# Patient Record
Sex: MALE | Race: BLACK OR AFRICAN AMERICAN | NOT HISPANIC OR LATINO | Employment: OTHER | URBAN - METROPOLITAN AREA
[De-identification: names, ages, dates, MRNs, and addresses within clinical notes are randomized per-mention and may not be internally consistent; named-entity substitution may affect disease eponyms.]

---

## 2020-07-07 ENCOUNTER — OFFICE VISIT (OUTPATIENT)
Dept: OPHTHALMOLOGY | Facility: CLINIC | Age: 70
End: 2020-07-07
Payer: MEDICARE

## 2020-07-07 DIAGNOSIS — H40.1133 PRIMARY OPEN ANGLE GLAUCOMA (POAG) OF BOTH EYES, SEVERE STAGE: Primary | ICD-10-CM

## 2020-07-07 DIAGNOSIS — H26.9 CORTICAL CATARACT OF BOTH EYES: ICD-10-CM

## 2020-07-07 PROCEDURE — 92002 PR EYE EXAM, NEW PATIENT,INTERMED: ICD-10-PCS | Mod: HCNC,S$GLB,, | Performed by: OPHTHALMOLOGY

## 2020-07-07 PROCEDURE — 99999 PR PBB SHADOW E&M-EST. PATIENT-LVL II: ICD-10-PCS | Mod: PBBFAC,HCNC,, | Performed by: OPHTHALMOLOGY

## 2020-07-07 PROCEDURE — 99999 PR PBB SHADOW E&M-EST. PATIENT-LVL II: CPT | Mod: PBBFAC,HCNC,, | Performed by: OPHTHALMOLOGY

## 2020-07-07 PROCEDURE — 92133 POSTERIOR SEGMENT OCT OPTIC NERVE(OCULAR COHERENCE TOMOGRAPHY) - OU - BOTH EYES: ICD-10-PCS | Mod: HCNC,S$GLB,, | Performed by: OPHTHALMOLOGY

## 2020-07-07 PROCEDURE — 92083 HUMPHREY VISUAL FIELD - OU - BOTH EYES: ICD-10-PCS | Mod: HCNC,S$GLB,, | Performed by: OPHTHALMOLOGY

## 2020-07-07 PROCEDURE — 92133 CPTRZD OPH DX IMG PST SGM ON: CPT | Mod: HCNC,S$GLB,, | Performed by: OPHTHALMOLOGY

## 2020-07-07 PROCEDURE — 92002 INTRM OPH EXAM NEW PATIENT: CPT | Mod: HCNC,S$GLB,, | Performed by: OPHTHALMOLOGY

## 2020-07-07 PROCEDURE — 92083 EXTENDED VISUAL FIELD XM: CPT | Mod: HCNC,S$GLB,, | Performed by: OPHTHALMOLOGY

## 2020-07-07 RX ORDER — LATANOPROST 50 UG/ML
1 SOLUTION/ DROPS OPHTHALMIC NIGHTLY
Qty: 1 BOTTLE | Refills: 12 | Status: SHIPPED | OUTPATIENT
Start: 2020-07-07 | End: 2021-09-09

## 2020-07-07 RX ORDER — CARVEDILOL 25 MG/1
25 TABLET ORAL
COMMUNITY
Start: 2020-03-24 | End: 2022-03-08 | Stop reason: SDUPTHER

## 2020-07-07 RX ORDER — SPIRONOLACTONE 100 MG/1
100 TABLET, FILM COATED ORAL
COMMUNITY
Start: 2020-03-24 | End: 2022-03-08 | Stop reason: SDUPTHER

## 2020-07-07 RX ORDER — ASPIRIN 81 MG/1
81 TABLET ORAL
COMMUNITY
Start: 2020-02-28

## 2020-07-07 RX ORDER — NITROGLYCERIN 0.4 MG/1
TABLET SUBLINGUAL
COMMUNITY
Start: 2020-02-28 | End: 2022-03-08 | Stop reason: SDUPTHER

## 2020-07-07 RX ORDER — LISINOPRIL 40 MG/1
40 TABLET ORAL
COMMUNITY
Start: 2020-03-24 | End: 2022-03-08 | Stop reason: SDUPTHER

## 2020-07-07 RX ORDER — APIXABAN 5 MG/1
TABLET, FILM COATED ORAL
COMMUNITY
Start: 2020-06-04 | End: 2022-03-08 | Stop reason: SDUPTHER

## 2020-07-07 RX ORDER — ATORVASTATIN CALCIUM 80 MG/1
80 TABLET, FILM COATED ORAL
COMMUNITY
Start: 2020-03-24 | End: 2022-03-08 | Stop reason: SDUPTHER

## 2020-07-07 RX ORDER — FUROSEMIDE 40 MG/1
40 TABLET ORAL
COMMUNITY
Start: 2020-03-24 | End: 2022-03-08

## 2020-07-07 NOTE — PROGRESS NOTES
SUBJECTIVE  Raghavendra JUANA Luz is 70 y.o. male  Corrected distance visual acuity was 20/200 in the right eye and 20/50 in the left eye.   No chief complaint on file.              Assessment /Plan :  1. Primary open angle glaucoma (POAG) of both eyes, severe stage IOP not within acceptable range relative to target IOP with risk of irreversible visual loss. Better IOP control is recommended. Discussed options, risks, and benefits of additional medication, SLT laser, and/or incisional glaucoma surgery. Reviewed importance of continued compliance with treatment and follow up.     Patient chooses to start Latanoprost OU qhs     2. Cortical cataract of both eyes monitor for now         Return to clinic in 4 weeks  or as needed.  With Dilation and SDP

## 2020-07-08 ENCOUNTER — TELEPHONE (OUTPATIENT)
Dept: OPHTHALMOLOGY | Facility: CLINIC | Age: 70
End: 2020-07-08

## 2020-07-08 NOTE — TELEPHONE ENCOUNTER
The release of information forms have been faxed to Dr. Trejo's office and [facility]'s office.

## 2020-08-06 ENCOUNTER — OFFICE VISIT (OUTPATIENT)
Dept: OPHTHALMOLOGY | Facility: CLINIC | Age: 70
End: 2020-08-06
Payer: MEDICARE

## 2020-08-06 DIAGNOSIS — H26.9 CORTICAL CATARACT OF BOTH EYES: ICD-10-CM

## 2020-08-06 DIAGNOSIS — H25.13 NUCLEAR SCLEROSIS OF BOTH EYES: ICD-10-CM

## 2020-08-06 DIAGNOSIS — H40.1133 PRIMARY OPEN ANGLE GLAUCOMA (POAG) OF BOTH EYES, SEVERE STAGE: Primary | ICD-10-CM

## 2020-08-06 PROCEDURE — 99999 PR PBB SHADOW E&M-EST. PATIENT-LVL II: ICD-10-PCS | Mod: PBBFAC,HCNC,, | Performed by: OPHTHALMOLOGY

## 2020-08-06 PROCEDURE — 92020 GONIOSCOPY: CPT | Mod: HCNC,S$GLB,, | Performed by: OPHTHALMOLOGY

## 2020-08-06 PROCEDURE — 92014 PR EYE EXAM, EST PATIENT,COMPREHESV: ICD-10-PCS | Mod: HCNC,S$GLB,, | Performed by: OPHTHALMOLOGY

## 2020-08-06 PROCEDURE — 92014 COMPRE OPH EXAM EST PT 1/>: CPT | Mod: HCNC,S$GLB,, | Performed by: OPHTHALMOLOGY

## 2020-08-06 PROCEDURE — 99999 PR PBB SHADOW E&M-EST. PATIENT-LVL II: CPT | Mod: PBBFAC,HCNC,, | Performed by: OPHTHALMOLOGY

## 2020-08-06 PROCEDURE — 92020 PR SPECIAL EYE EVAL,GONIOSCOPY: ICD-10-PCS | Mod: HCNC,S$GLB,, | Performed by: OPHTHALMOLOGY

## 2020-08-06 RX ORDER — NETARSUDIL 0.2 MG/ML
1 SOLUTION/ DROPS OPHTHALMIC; TOPICAL NIGHTLY
Qty: 2.5 ML | Refills: 12
Start: 2020-08-06 | End: 2022-03-03 | Stop reason: SDUPTHER

## 2020-08-06 NOTE — PROGRESS NOTES
SUBJECTIVE  Raghavendra JUANA Luz is 70 y.o. male  Corrected distance visual acuity was CF at 3' in the right eye and 20/50 -2 in the left eye.   Chief Complaint   Patient presents with    Glaucoma     4 week IOP check since starting latanoprost, then dilation           HPI     Glaucoma      Additional comments: 4 week IOP check since starting latanoprost, then   dilation               Comments     Pt states 50% compliance with drops    1. Glaucoma  Fhx Father   Glaucoma surgery OU 2009    Latanoprost qhs OU           Last edited by Kristina Aden MA on 8/6/2020  1:38 PM. (History)         Assessment /Plan :  1. Primary open angle glaucoma (POAG) of both eyes, severe stage IOP not within acceptable range relative to target IOP with risk of irreversible visual loss. Better IOP control is recommended. Discussed options, risks, and benefits of additional medication, SLT laser, and/or incisional glaucoma surgery. Reviewed importance of continued compliance with treatment and follow up.     Patient chooses to add Rhopressa OU qhs  Continue Latanoprost ou qhs     2. Cortical cataract of both eyes monitor for now   3. Nuclear sclerosis of both eyes monitor for now     Return to clinic in 3-4 weeks  or as needed.  With IOP Check and CCT

## 2022-01-05 ENCOUNTER — TELEPHONE (OUTPATIENT)
Dept: OPHTHALMOLOGY | Facility: CLINIC | Age: 72
End: 2022-01-05
Payer: MEDICARE

## 2022-01-05 NOTE — TELEPHONE ENCOUNTER
----- Message from Anamaria Singh sent at 1/5/2022  3:40 PM CST -----  .Type:  Needs Medical Advice    Who Called: JACKELINE HOOPER [[id number]]  Symptoms (please be specific):   How long has patient had these symptoms:   Pharmacy name and phone #:    Would the patient rather a call back or a response via [facility]?   Best Call Back Number:  [phone]  Additional Information:  Pt is requesting a call from office to schedule appt with Dr Temple. Please call to schedule.

## 2022-01-25 ENCOUNTER — PATIENT OUTREACH (OUTPATIENT)
Dept: ADMINISTRATIVE | Facility: HOSPITAL | Age: 72
End: 2022-01-25
Payer: MEDICARE

## 2022-03-03 ENCOUNTER — OFFICE VISIT (OUTPATIENT)
Dept: OPHTHALMOLOGY | Facility: CLINIC | Age: 72
End: 2022-03-03
Payer: MEDICARE

## 2022-03-03 DIAGNOSIS — H40.1133 PRIMARY OPEN ANGLE GLAUCOMA (POAG) OF BOTH EYES, SEVERE STAGE: Primary | ICD-10-CM

## 2022-03-03 DIAGNOSIS — H52.7 REFRACTIVE ERROR: ICD-10-CM

## 2022-03-03 DIAGNOSIS — H26.9 CORTICAL CATARACT OF BOTH EYES: ICD-10-CM

## 2022-03-03 DIAGNOSIS — H25.13 NUCLEAR SCLEROSIS OF BOTH EYES: ICD-10-CM

## 2022-03-03 PROCEDURE — 1159F MED LIST DOCD IN RCRD: CPT | Mod: CPTII,S$GLB,, | Performed by: OPHTHALMOLOGY

## 2022-03-03 PROCEDURE — 92015 DETERMINE REFRACTIVE STATE: CPT | Mod: S$GLB,,, | Performed by: OPHTHALMOLOGY

## 2022-03-03 PROCEDURE — 1160F PR REVIEW ALL MEDS BY PRESCRIBER/CLIN PHARMACIST DOCUMENTED: ICD-10-PCS | Mod: CPTII,S$GLB,, | Performed by: OPHTHALMOLOGY

## 2022-03-03 PROCEDURE — 92015 PR REFRACTION: ICD-10-PCS | Mod: S$GLB,,, | Performed by: OPHTHALMOLOGY

## 2022-03-03 PROCEDURE — 99214 PR OFFICE/OUTPT VISIT, EST, LEVL IV, 30-39 MIN: ICD-10-PCS | Mod: S$GLB,,, | Performed by: OPHTHALMOLOGY

## 2022-03-03 PROCEDURE — 99999 PR PBB SHADOW E&M-EST. PATIENT-LVL III: CPT | Mod: PBBFAC,,, | Performed by: OPHTHALMOLOGY

## 2022-03-03 PROCEDURE — 1159F PR MEDICATION LIST DOCUMENTED IN MEDICAL RECORD: ICD-10-PCS | Mod: CPTII,S$GLB,, | Performed by: OPHTHALMOLOGY

## 2022-03-03 PROCEDURE — 92133 CPTRZD OPH DX IMG PST SGM ON: CPT | Mod: S$GLB,,, | Performed by: OPHTHALMOLOGY

## 2022-03-03 PROCEDURE — 92133 POSTERIOR SEGMENT OCT OPTIC NERVE(OCULAR COHERENCE TOMOGRAPHY) - OU - BOTH EYES: ICD-10-PCS | Mod: S$GLB,,, | Performed by: OPHTHALMOLOGY

## 2022-03-03 PROCEDURE — 99999 PR PBB SHADOW E&M-EST. PATIENT-LVL III: ICD-10-PCS | Mod: PBBFAC,,, | Performed by: OPHTHALMOLOGY

## 2022-03-03 PROCEDURE — 99214 OFFICE O/P EST MOD 30 MIN: CPT | Mod: S$GLB,,, | Performed by: OPHTHALMOLOGY

## 2022-03-03 PROCEDURE — 1160F RVW MEDS BY RX/DR IN RCRD: CPT | Mod: CPTII,S$GLB,, | Performed by: OPHTHALMOLOGY

## 2022-03-03 RX ORDER — BRIMONIDINE TARTRATE 2 MG/ML
1 SOLUTION/ DROPS OPHTHALMIC EVERY 12 HOURS
Qty: 10 ML | Refills: 12 | Status: SHIPPED | OUTPATIENT
Start: 2022-03-03

## 2022-03-03 RX ORDER — NETARSUDIL AND LATANOPROST OPHTHALMIC SOLUTION, 0.02%/0.005% .2; .05 MG/ML; MG/ML
1 SOLUTION/ DROPS OPHTHALMIC; TOPICAL NIGHTLY
Qty: 2.5 ML | Refills: 12
Start: 2022-03-03

## 2022-03-03 NOTE — PROGRESS NOTES
SUBJECTIVE  Raghavendra JUANA Luz is 71 y.o. male  Corrected distance visual acuity was 20/400 in the right eye and 20/50 -1 in the left eye.   Chief Complaint   Patient presents with    Glaucoma          HPI     Pt in today for an IOP check and GOCT. Pt states his vision has changed   since his last visit. Pt denies any ocular pain, but states his eyes have   been itching and burning a lot. Pt states he hasn't been compliant with   his drops.     Insurance changed from Dr. Schaefer  Self Referral-Dr. Carine Trejo was his previous doctor    1. Severe COAG Fhx Father   Glaucoma surgery OU 2009 (valve OU with Dr. Guzman)  2. NS OU  Cortical cataract     Rocklatan qhs OU  Latanoprost qhs OU    Old Records- Neil  4/09 NCT 22/25 c/d 1.0/0.8  7/14 Ta 18/19  11/15 Ta 18/18 11/19 c/d 1.0/0.8 Ta 18/18 refer to Jaqui      Last edited by Maren Steele on 3/3/2022 10:12 AM. (History)         Assessment /Plan :  1. Primary open angle glaucoma (POAG) of both eyes, severe stage IOP not within acceptable range relative to target IOP with risk of irreversible visual loss. Better IOP control is recommended. Discussed options, risks, and benefits of additional medication, SLT laser, and/or incisional glaucoma surgery. Reviewed importance of continued compliance with treatment and follow up.     Patient chooses to change Latanoprost to Rocklatan one drop in each eye every night. Add Brimonidine one drop in each eye every 12 hours    Return to clinic in 5 weeks  or as needed.  With IOP Check, Dilation and HVF 24-2     2. Cortical cataract of both eyes - monitor for now   3. Nuclear sclerosis of both eyes - monitor for nwo   4.      Refractive Error Bifocal Rx

## 2022-03-08 ENCOUNTER — OFFICE VISIT (OUTPATIENT)
Dept: INTERNAL MEDICINE | Facility: CLINIC | Age: 72
End: 2022-03-08
Payer: MEDICARE

## 2022-03-08 VITALS
HEART RATE: 104 BPM | RESPIRATION RATE: 18 BRPM | DIASTOLIC BLOOD PRESSURE: 98 MMHG | HEIGHT: 75 IN | SYSTOLIC BLOOD PRESSURE: 148 MMHG | OXYGEN SATURATION: 97 % | BODY MASS INDEX: 27.66 KG/M2 | WEIGHT: 222.44 LBS

## 2022-03-08 DIAGNOSIS — I26.94 MULTIPLE SUBSEGMENTAL PULMONARY EMBOLI WITHOUT ACUTE COR PULMONALE: ICD-10-CM

## 2022-03-08 DIAGNOSIS — R22.1 MASS OF RIGHT SIDE OF NECK: ICD-10-CM

## 2022-03-08 DIAGNOSIS — H40.1133 PRIMARY OPEN ANGLE GLAUCOMA (POAG) OF BOTH EYES, SEVERE STAGE: ICD-10-CM

## 2022-03-08 DIAGNOSIS — I25.10 ATHEROSCLEROSIS OF NATIVE CORONARY ARTERY OF NATIVE HEART WITHOUT ANGINA PECTORIS: ICD-10-CM

## 2022-03-08 DIAGNOSIS — I48.21 PERMANENT ATRIAL FIBRILLATION: ICD-10-CM

## 2022-03-08 DIAGNOSIS — Z95.810 ICD (IMPLANTABLE CARDIOVERTER-DEFIBRILLATOR) IN PLACE: ICD-10-CM

## 2022-03-08 DIAGNOSIS — J41.8 MIXED SIMPLE AND MUCOPURULENT CHRONIC BRONCHITIS: ICD-10-CM

## 2022-03-08 DIAGNOSIS — I10 ESSENTIAL HYPERTENSION: Primary | ICD-10-CM

## 2022-03-08 DIAGNOSIS — E78.2 MIXED HYPERLIPIDEMIA: ICD-10-CM

## 2022-03-08 DIAGNOSIS — I50.22 CHRONIC SYSTOLIC HEART FAILURE: ICD-10-CM

## 2022-03-08 DIAGNOSIS — I71.20 THORACIC AORTIC ANEURYSM WITHOUT RUPTURE: ICD-10-CM

## 2022-03-08 PROBLEM — I50.23 ACUTE ON CHRONIC SYSTOLIC CHF (CONGESTIVE HEART FAILURE): Status: ACTIVE | Noted: 2018-06-12

## 2022-03-08 PROBLEM — J44.1 COPD EXACERBATION: Status: RESOLVED | Noted: 2020-02-26 | Resolved: 2022-03-08

## 2022-03-08 PROBLEM — J44.1 COPD EXACERBATION: Status: ACTIVE | Noted: 2020-02-26

## 2022-03-08 PROBLEM — I25.5 ISCHEMIC CARDIOMYOPATHY: Status: ACTIVE | Noted: 2018-06-12

## 2022-03-08 LAB
ESTIMATED AVG GLUCOSE: 117 MG/DL (ref 68–131)
HBA1C MFR BLD: 5.7 % (ref 4–5.6)

## 2022-03-08 PROCEDURE — 80053 COMPREHEN METABOLIC PANEL: CPT | Performed by: NURSE PRACTITIONER

## 2022-03-08 PROCEDURE — 80061 LIPID PANEL: CPT | Performed by: NURSE PRACTITIONER

## 2022-03-08 PROCEDURE — 3077F PR MOST RECENT SYSTOLIC BLOOD PRESSURE >= 140 MM HG: ICD-10-PCS | Mod: CPTII,S$GLB,, | Performed by: NURSE PRACTITIONER

## 2022-03-08 PROCEDURE — 99999 PR PBB SHADOW E&M-EST. PATIENT-LVL IV: ICD-10-PCS | Mod: PBBFAC,,, | Performed by: NURSE PRACTITIONER

## 2022-03-08 PROCEDURE — 99999 PR PBB SHADOW E&M-EST. PATIENT-LVL IV: CPT | Mod: PBBFAC,,, | Performed by: NURSE PRACTITIONER

## 2022-03-08 PROCEDURE — 3077F SYST BP >= 140 MM HG: CPT | Mod: CPTII,S$GLB,, | Performed by: NURSE PRACTITIONER

## 2022-03-08 PROCEDURE — 99204 PR OFFICE/OUTPT VISIT, NEW, LEVL IV, 45-59 MIN: ICD-10-PCS | Mod: S$GLB,,, | Performed by: NURSE PRACTITIONER

## 2022-03-08 PROCEDURE — 99499 RISK ADDL DX/OHS AUDIT: ICD-10-PCS | Mod: HCNC,S$GLB,, | Performed by: NURSE PRACTITIONER

## 2022-03-08 PROCEDURE — 99204 OFFICE O/P NEW MOD 45 MIN: CPT | Mod: S$GLB,,, | Performed by: NURSE PRACTITIONER

## 2022-03-08 PROCEDURE — 3080F DIAST BP >= 90 MM HG: CPT | Mod: CPTII,S$GLB,, | Performed by: NURSE PRACTITIONER

## 2022-03-08 PROCEDURE — 83036 HEMOGLOBIN GLYCOSYLATED A1C: CPT | Performed by: NURSE PRACTITIONER

## 2022-03-08 PROCEDURE — 3008F PR BODY MASS INDEX (BMI) DOCUMENTED: ICD-10-PCS | Mod: CPTII,S$GLB,, | Performed by: NURSE PRACTITIONER

## 2022-03-08 PROCEDURE — 1159F MED LIST DOCD IN RCRD: CPT | Mod: CPTII,S$GLB,, | Performed by: NURSE PRACTITIONER

## 2022-03-08 PROCEDURE — 84443 ASSAY THYROID STIM HORMONE: CPT | Performed by: NURSE PRACTITIONER

## 2022-03-08 PROCEDURE — 85025 COMPLETE CBC W/AUTO DIFF WBC: CPT | Performed by: NURSE PRACTITIONER

## 2022-03-08 PROCEDURE — 4010F ACE/ARB THERAPY RXD/TAKEN: CPT | Mod: CPTII,S$GLB,, | Performed by: NURSE PRACTITIONER

## 2022-03-08 PROCEDURE — 3008F BODY MASS INDEX DOCD: CPT | Mod: CPTII,S$GLB,, | Performed by: NURSE PRACTITIONER

## 2022-03-08 PROCEDURE — 3044F PR MOST RECENT HEMOGLOBIN A1C LEVEL <7.0%: ICD-10-PCS | Mod: CPTII,S$GLB,, | Performed by: NURSE PRACTITIONER

## 2022-03-08 PROCEDURE — 3044F HG A1C LEVEL LT 7.0%: CPT | Mod: CPTII,S$GLB,, | Performed by: NURSE PRACTITIONER

## 2022-03-08 PROCEDURE — 1159F PR MEDICATION LIST DOCUMENTED IN MEDICAL RECORD: ICD-10-PCS | Mod: CPTII,S$GLB,, | Performed by: NURSE PRACTITIONER

## 2022-03-08 PROCEDURE — 3080F PR MOST RECENT DIASTOLIC BLOOD PRESSURE >= 90 MM HG: ICD-10-PCS | Mod: CPTII,S$GLB,, | Performed by: NURSE PRACTITIONER

## 2022-03-08 PROCEDURE — 4010F PR ACE/ARB THEARPY RXD/TAKEN: ICD-10-PCS | Mod: CPTII,S$GLB,, | Performed by: NURSE PRACTITIONER

## 2022-03-08 PROCEDURE — 99499 UNLISTED E&M SERVICE: CPT | Mod: HCNC,S$GLB,, | Performed by: NURSE PRACTITIONER

## 2022-03-08 RX ORDER — FUROSEMIDE 40 MG/1
40 TABLET ORAL DAILY PRN
Qty: 90 TABLET | Refills: 0 | Status: SHIPPED | OUTPATIENT
Start: 2022-03-08 | End: 2022-08-24

## 2022-03-08 RX ORDER — APIXABAN 5 MG/1
5 TABLET, FILM COATED ORAL 2 TIMES DAILY
Qty: 180 TABLET | Refills: 1 | Status: SHIPPED | OUTPATIENT
Start: 2022-03-08 | End: 2022-04-20 | Stop reason: SDUPTHER

## 2022-03-08 RX ORDER — NITROGLYCERIN 0.4 MG/1
TABLET SUBLINGUAL
Qty: 20 TABLET | Refills: 0 | Status: SHIPPED | OUTPATIENT
Start: 2022-03-08

## 2022-03-08 RX ORDER — ATORVASTATIN CALCIUM 80 MG/1
80 TABLET, FILM COATED ORAL DAILY
Qty: 90 TABLET | Refills: 1 | Status: SHIPPED | OUTPATIENT
Start: 2022-03-08 | End: 2022-08-24

## 2022-03-08 RX ORDER — SPIRONOLACTONE 100 MG/1
100 TABLET, FILM COATED ORAL DAILY
Qty: 90 TABLET | Refills: 1 | Status: SHIPPED | OUTPATIENT
Start: 2022-03-08 | End: 2023-01-17

## 2022-03-08 RX ORDER — LISINOPRIL 40 MG/1
40 TABLET ORAL DAILY
Qty: 90 TABLET | Refills: 1 | Status: SHIPPED | OUTPATIENT
Start: 2022-03-08 | End: 2022-11-29

## 2022-03-08 RX ORDER — CARVEDILOL 25 MG/1
25 TABLET ORAL 2 TIMES DAILY WITH MEALS
Qty: 180 TABLET | Refills: 1 | Status: SHIPPED | OUTPATIENT
Start: 2022-03-08 | End: 2023-01-17

## 2022-03-09 PROBLEM — I24.1 DRESSLER'S SYNDROME: Status: ACTIVE | Noted: 2018-06-12

## 2022-03-09 PROBLEM — H40.1133 PRIMARY OPEN ANGLE GLAUCOMA (POAG) OF BOTH EYES, SEVERE STAGE: Status: ACTIVE | Noted: 2022-03-09

## 2022-03-09 LAB
ALBUMIN SERPL BCP-MCNC: 4.2 G/DL (ref 3.5–5.2)
ALP SERPL-CCNC: 62 U/L (ref 55–135)
ALT SERPL W/O P-5'-P-CCNC: 48 U/L (ref 10–44)
ANION GAP SERPL CALC-SCNC: 12 MMOL/L (ref 8–16)
AST SERPL-CCNC: 28 U/L (ref 10–40)
BASOPHILS # BLD AUTO: 0.04 K/UL (ref 0–0.2)
BASOPHILS NFR BLD: 1 % (ref 0–1.9)
BILIRUB SERPL-MCNC: 0.5 MG/DL (ref 0.1–1)
BUN SERPL-MCNC: 7 MG/DL (ref 8–23)
CALCIUM SERPL-MCNC: 9.5 MG/DL (ref 8.7–10.5)
CHLORIDE SERPL-SCNC: 99 MMOL/L (ref 95–110)
CHOLEST SERPL-MCNC: 142 MG/DL (ref 120–199)
CHOLEST/HDLC SERPL: 3.7 {RATIO} (ref 2–5)
CO2 SERPL-SCNC: 21 MMOL/L (ref 23–29)
CREAT SERPL-MCNC: 1 MG/DL (ref 0.5–1.4)
DIFFERENTIAL METHOD: ABNORMAL
EOSINOPHIL # BLD AUTO: 0.2 K/UL (ref 0–0.5)
EOSINOPHIL NFR BLD: 4.5 % (ref 0–8)
ERYTHROCYTE [DISTWIDTH] IN BLOOD BY AUTOMATED COUNT: 19.1 % (ref 11.5–14.5)
EST. GFR  (AFRICAN AMERICAN): >60 ML/MIN/1.73 M^2
EST. GFR  (NON AFRICAN AMERICAN): >60 ML/MIN/1.73 M^2
GLUCOSE SERPL-MCNC: 90 MG/DL (ref 70–110)
HCT VFR BLD AUTO: 54 % (ref 40–54)
HDLC SERPL-MCNC: 38 MG/DL (ref 40–75)
HDLC SERPL: 26.8 % (ref 20–50)
HGB BLD-MCNC: 17.1 G/DL (ref 14–18)
IMM GRANULOCYTES # BLD AUTO: 0.01 K/UL (ref 0–0.04)
IMM GRANULOCYTES NFR BLD AUTO: 0.3 % (ref 0–0.5)
LDLC SERPL CALC-MCNC: 84.8 MG/DL (ref 63–159)
LYMPHOCYTES # BLD AUTO: 1.5 K/UL (ref 1–4.8)
LYMPHOCYTES NFR BLD: 36.3 % (ref 18–48)
MCH RBC QN AUTO: 24.2 PG (ref 27–31)
MCHC RBC AUTO-ENTMCNC: 31.7 G/DL (ref 32–36)
MCV RBC AUTO: 76 FL (ref 82–98)
MONOCYTES # BLD AUTO: 0.5 K/UL (ref 0.3–1)
MONOCYTES NFR BLD: 12.5 % (ref 4–15)
NEUTROPHILS # BLD AUTO: 1.8 K/UL (ref 1.8–7.7)
NEUTROPHILS NFR BLD: 45.4 % (ref 38–73)
NONHDLC SERPL-MCNC: 104 MG/DL
NRBC BLD-RTO: 0 /100 WBC
PLATELET # BLD AUTO: 228 K/UL (ref 150–450)
PMV BLD AUTO: 11.8 FL (ref 9.2–12.9)
POTASSIUM SERPL-SCNC: 4.3 MMOL/L (ref 3.5–5.1)
PROT SERPL-MCNC: 7.4 G/DL (ref 6–8.4)
RBC # BLD AUTO: 7.08 M/UL (ref 4.6–6.2)
SODIUM SERPL-SCNC: 132 MMOL/L (ref 136–145)
TRIGL SERPL-MCNC: 96 MG/DL (ref 30–150)
TSH SERPL DL<=0.005 MIU/L-ACNC: 3.18 UIU/ML (ref 0.4–4)
WBC # BLD AUTO: 4 K/UL (ref 3.9–12.7)

## 2022-03-09 NOTE — PROGRESS NOTES
Subjective:       Patient ID: Raghavendra Luz is a 71 y.o. male.    Chief Complaint: Establish Care    HPI  He is looking to est care  Out of all meds/care > 1 year      No acute complaints  HTN- not controlled. Out of meds > 1 year. no HTNsive s/s  COPD-stable per pt. Intermittent sob  Hx PE/AF-was on eliquis/coreg  Severe glaucoma-seeing opht          Per Card note 3/2020  1.  Pulmonary embolism, recent hospitalization with chest pain and dyspnea associated with findings on CT consistent with pulmonary emboli. He is now anticoagulated on Eliquis. Symptoms appear to be stable.   2 Chronic systolic heart failure, symptoms currently stable. Continue Lasix, ACE inhibitor, beta-blocker, spironolactone.   3.  Coronary artery disease with history of anterolateral MI with delayed presentation and occluded mid-LAD noted at catheterization in February of 2011.  At that time, he also underwent stenting of the right coronary artery.  His most recent catheterization in January of 2014 revealed stable disease with patent stent site in the right coronary artery.  No recent symptoms of angina.  4.  Paroxysmal atrial flutter.  Currently in sinus rhythm, continue anticoagulation with Eliquis.  5.  Ischemic cardiomyopathy.  Left ventricular ejection fraction 25-30%.    6.  ICD implant for his ischemic cardiomyopathy with normal function on recent interrogation.  7.  Essential hypertension.  continue the beta blocker and ACE inhibitor.  8.  Hyperlipidemia, currently on statin therapy.   9.  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 No pertinent surgical history.  History reviewed. No pertinent surgical history.  Social History     Socioeconomic History    Marital status:    Tobacco Use    Smoking status: Current Every Day Smoker     Years: 54.00     Types: Cigarettes    Smokeless tobacco: Never Used    Substance and Sexual Activity    Alcohol use: Yes    Drug use: Never    Sexual activity: Not Currently     Review of patient's allergies indicates:   Allergen Reactions    Iodine Anaphylaxis    Olmesartan Hives     Current Outpatient Medications   Medication Sig    aspirin (ECOTRIN) 81 MG EC tablet Take 81 mg by mouth.    brimonidine 0.2% (ALPHAGAN) 0.2 % Drop Place 1 drop into both eyes every 12 (twelve) hours.    netarsudiL-latanoprost (ROCKLATAN) 0.02-0.005 % Drop Place 1 drop into both eyes every evening.    atorvastatin (LIPITOR) 80 MG tablet Take 1 tablet (80 mg total) by mouth once daily.    carvediloL (COREG) 25 MG tablet Take 1 tablet (25 mg total) by mouth 2 (two) times daily with meals.    ELIQUIS 5 mg Tab Take 1 tablet (5 mg total) by mouth 2 (two) times daily.    furosemide (LASIX) 40 MG tablet Take 1 tablet (40 mg total) by mouth daily as needed (swelling).    lisinopriL (PRINIVIL,ZESTRIL) 40 MG tablet Take 1 tablet (40 mg total) by mouth once daily.    nitroGLYCERIN (NITROSTAT) 0.4 MG SL tablet PLACE 1 TABLET UNDER THE TONGUE EVERY 5 (FIVE) MINUTES AS NEEDED FOR CHEST PAIN.MAX 3 DOSES IN15 MIN    spironolactone (ALDACTONE) 100 MG tablet Take 1 tablet (100 mg total) by mouth once daily.     No current facility-administered medications for this visit.           Review of Systems   Constitutional: Negative for activity change, appetite change, chills, diaphoresis, fatigue, fever and unexpected weight change.   HENT: Negative for congestion, ear pain, postnasal drip, rhinorrhea, sinus pressure, sinus pain, sneezing, sore throat, tinnitus, trouble swallowing and voice change.    Eyes: Negative for photophobia, pain and visual disturbance.   Respiratory: Positive for shortness of breath. Negative for cough, chest tightness and wheezing.    Cardiovascular: Negative for chest pain, palpitations and leg swelling.   Gastrointestinal: Negative for abdominal distention, abdominal pain,  constipation, diarrhea, nausea and vomiting.   Genitourinary: Negative for decreased urine volume, difficulty urinating, dysuria, flank pain, frequency, hematuria and urgency.   Musculoskeletal: Negative for arthralgias, back pain, joint swelling, neck pain and neck stiffness.   Allergic/Immunologic: Negative for immunocompromised state.   Neurological: Negative for dizziness, tremors, seizures, syncope, facial asymmetry, speech difficulty, weakness, light-headedness, numbness and headaches.   Hematological: Negative for adenopathy. Does not bruise/bleed easily.   Psychiatric/Behavioral: Negative for confusion and sleep disturbance.       Objective:      Physical Exam  HENT:      Head:        Comments: There is a firm nodule nodule . nontender     Right Ear: Tympanic membrane normal.      Left Ear: Tympanic membrane normal.      Mouth/Throat:      Mouth: Mucous membranes are moist.      Pharynx: Oropharynx is clear. Uvula midline.   Cardiovascular:      Rate and Rhythm: Normal rate. Rhythm irregularly irregular.   Pulmonary:      Effort: Pulmonary effort is normal.      Breath sounds: Normal breath sounds.   Abdominal:      General: Bowel sounds are normal.      Palpations: Abdomen is soft.   Musculoskeletal:         General: Normal range of motion.      Cervical back: Normal range of motion and neck supple.   Skin:     General: Skin is warm and dry.   Neurological:      Mental Status: He is oriented to person, place, and time.   Psychiatric:         Mood and Affect: Mood normal.         Assessment:     Vitals:    03/08/22 1101   BP: (!) 148/98   Pulse: 104   Resp: 18         1. Essential hypertension    2. ICD (implantable cardioverter-defibrillator) in place    3. Mixed hyperlipidemia    4. Permanent atrial fibrillation    5. Mixed simple and mucopurulent chronic bronchitis    6. Mass of right side of neck    7. Thoracic aortic aneurysm without rupture    8. Multiple subsegmental pulmonary emboli without acute cor  pulmonale    9. Chronic systolic heart failure    10. Atherosclerosis of native coronary artery of native heart without angina pectoris    11. Primary open angle glaucoma (POAG) of both eyes, severe stage        Plan:   Essential hypertension  -     Comprehensive Metabolic Panel  -     Hemoglobin A1C  -     CBC Auto Differential  -     TSH    ICD (implantable cardioverter-defibrillator) in place    Mixed hyperlipidemia  -     Lipid Panel    Permanent atrial fibrillation    Mixed simple and mucopurulent chronic bronchitis  -     Ambulatory referral/consult to Pulmonology; Future; Expected date: 03/16/2022    Mass of right side of neck  -     US Soft Tissue Head Neck Thyroid; Future; Expected date: 03/08/2022    Thoracic aortic aneurysm without rupture  -     US Abdominal Aorta; Future; Expected date: 03/08/2022    Multiple subsegmental pulmonary emboli without acute cor pulmonale  -     Ambulatory referral/consult to Pulmonology; Future; Expected date: 03/16/2022    Chronic systolic heart failure    Atherosclerosis of native coronary artery of native heart without angina pectoris    Primary open angle glaucoma (POAG) of both eyes, severe stage    Other orders  -     atorvastatin (LIPITOR) 80 MG tablet; Take 1 tablet (80 mg total) by mouth once daily.  Dispense: 90 tablet; Refill: 1  -     carvediloL (COREG) 25 MG tablet; Take 1 tablet (25 mg total) by mouth 2 (two) times daily with meals.  Dispense: 180 tablet; Refill: 1  -     ELIQUIS 5 mg Tab; Take 1 tablet (5 mg total) by mouth 2 (two) times daily.  Dispense: 180 tablet; Refill: 1  -     lisinopriL (PRINIVIL,ZESTRIL) 40 MG tablet; Take 1 tablet (40 mg total) by mouth once daily.  Dispense: 90 tablet; Refill: 1  -     spironolactone (ALDACTONE) 100 MG tablet; Take 1 tablet (100 mg total) by mouth once daily.  Dispense: 90 tablet; Refill: 1  -     furosemide (LASIX) 40 MG tablet; Take 1 tablet (40 mg total) by mouth daily as needed (swelling).  Dispense: 90 tablet;  Refill: 0  -     nitroGLYCERIN (NITROSTAT) 0.4 MG SL tablet; PLACE 1 TABLET UNDER THE TONGUE EVERY 5 (FIVE) MINUTES AS NEEDED FOR CHEST PAIN.MAX 3 DOSES IN15 MIN  Dispense: 20 tablet; Refill: 0      Pt to est care upcoming  Restart meds  Schedule f/u with external cardiologist  Low na diet  pulm appt  US aorta  US mass of lateral neck-R

## 2022-03-17 ENCOUNTER — HOSPITAL ENCOUNTER (OUTPATIENT)
Dept: RADIOLOGY | Facility: HOSPITAL | Age: 72
Discharge: HOME | End: 2022-03-17
Attending: NURSE PRACTITIONER
Payer: MEDICARE

## 2022-03-17 DIAGNOSIS — R22.1 MASS OF RIGHT SIDE OF NECK: ICD-10-CM

## 2022-03-17 DIAGNOSIS — I71.20 THORACIC AORTIC ANEURYSM WITHOUT RUPTURE: ICD-10-CM

## 2022-03-17 PROCEDURE — 76775 US EXAM ABDO BACK WALL LIM: CPT | Mod: 26,,, | Performed by: RADIOLOGY

## 2022-03-17 PROCEDURE — 76536 US EXAM OF HEAD AND NECK: CPT | Mod: 26,,, | Performed by: RADIOLOGY

## 2022-03-17 PROCEDURE — 76536 US EXAM OF HEAD AND NECK: CPT | Mod: TC

## 2022-03-17 PROCEDURE — 76775 US ABDOMINAL AORTA: ICD-10-PCS | Mod: 26,,, | Performed by: RADIOLOGY

## 2022-03-17 PROCEDURE — 76536 US SOFT TISSUE HEAD NECK THYROID: ICD-10-PCS | Mod: 26,,, | Performed by: RADIOLOGY

## 2022-03-17 PROCEDURE — 76775 US EXAM ABDO BACK WALL LIM: CPT | Mod: TC

## 2022-03-18 ENCOUNTER — TELEPHONE (OUTPATIENT)
Dept: INTERNAL MEDICINE | Facility: CLINIC | Age: 72
End: 2022-03-18
Payer: MEDICARE

## 2022-03-18 NOTE — TELEPHONE ENCOUNTER
----- Message from Chavo Meade sent at 3/18/2022  2:29 PM CDT -----  Contact: self  Type:  Patient Returning Call    Who Called: Raghavendra Luz   Who Left Message for Patient:nurse   Does the patient know what this is regarding?: not sure   Would the patient rather a call back or a response via [facility]? Call back   Best Call Back Number: [phone]   Additional Information:

## 2022-03-24 ENCOUNTER — OFFICE VISIT (OUTPATIENT)
Dept: INTERNAL MEDICINE | Facility: CLINIC | Age: 72
End: 2022-03-24
Payer: MEDICARE

## 2022-03-24 VITALS
SYSTOLIC BLOOD PRESSURE: 136 MMHG | HEIGHT: 73 IN | WEIGHT: 217.81 LBS | DIASTOLIC BLOOD PRESSURE: 88 MMHG | OXYGEN SATURATION: 98 % | BODY MASS INDEX: 28.87 KG/M2 | HEART RATE: 83 BPM | TEMPERATURE: 98 F | RESPIRATION RATE: 16 BRPM

## 2022-03-24 DIAGNOSIS — I25.10 ATHEROSCLEROSIS OF NATIVE CORONARY ARTERY OF NATIVE HEART WITHOUT ANGINA PECTORIS: ICD-10-CM

## 2022-03-24 DIAGNOSIS — E78.2 MIXED HYPERLIPIDEMIA: Primary | ICD-10-CM

## 2022-03-24 DIAGNOSIS — I48.21 PERMANENT ATRIAL FIBRILLATION: ICD-10-CM

## 2022-03-24 DIAGNOSIS — M75.41 CORACOID IMPINGEMENT OF RIGHT SHOULDER: ICD-10-CM

## 2022-03-24 DIAGNOSIS — Z11.59 ENCOUNTER FOR HEPATITIS C SCREENING TEST FOR LOW RISK PATIENT: ICD-10-CM

## 2022-03-24 DIAGNOSIS — I10 ESSENTIAL HYPERTENSION: ICD-10-CM

## 2022-03-24 DIAGNOSIS — J41.8 MIXED SIMPLE AND MUCOPURULENT CHRONIC BRONCHITIS: ICD-10-CM

## 2022-03-24 DIAGNOSIS — Z12.5 PROSTATE CANCER SCREENING: ICD-10-CM

## 2022-03-24 DIAGNOSIS — H40.1133 PRIMARY OPEN ANGLE GLAUCOMA (POAG) OF BOTH EYES, SEVERE STAGE: ICD-10-CM

## 2022-03-24 DIAGNOSIS — I50.22 CHRONIC SYSTOLIC HEART FAILURE: ICD-10-CM

## 2022-03-24 DIAGNOSIS — I25.5 ISCHEMIC CARDIOMYOPATHY: ICD-10-CM

## 2022-03-24 DIAGNOSIS — F51.01 PRIMARY INSOMNIA: ICD-10-CM

## 2022-03-24 DIAGNOSIS — Z95.810 ICD (IMPLANTABLE CARDIOVERTER-DEFIBRILLATOR) IN PLACE: ICD-10-CM

## 2022-03-24 DIAGNOSIS — Z12.11 COLON CANCER SCREENING: ICD-10-CM

## 2022-03-24 DIAGNOSIS — I26.94 MULTIPLE SUBSEGMENTAL PULMONARY EMBOLI WITHOUT ACUTE COR PULMONALE: ICD-10-CM

## 2022-03-24 DIAGNOSIS — E87.1 HYPONATREMIA: ICD-10-CM

## 2022-03-24 PROBLEM — I24.1 DRESSLER'S SYNDROME: Status: RESOLVED | Noted: 2018-06-12 | Resolved: 2022-03-24

## 2022-03-24 PROBLEM — I71.20 THORACIC AORTIC ANEURYSM: Status: RESOLVED | Noted: 2020-02-26 | Resolved: 2022-03-24

## 2022-03-24 PROCEDURE — 3288F PR FALLS RISK ASSESSMENT DOCUMENTED: ICD-10-PCS | Mod: CPTII,S$GLB,, | Performed by: PEDIATRICS

## 2022-03-24 PROCEDURE — 99214 PR OFFICE/OUTPT VISIT, EST, LEVL IV, 30-39 MIN: ICD-10-PCS | Mod: S$GLB,,, | Performed by: PEDIATRICS

## 2022-03-24 PROCEDURE — 4010F PR ACE/ARB THEARPY RXD/TAKEN: ICD-10-PCS | Mod: CPTII,S$GLB,, | Performed by: PEDIATRICS

## 2022-03-24 PROCEDURE — 3008F PR BODY MASS INDEX (BMI) DOCUMENTED: ICD-10-PCS | Mod: CPTII,S$GLB,, | Performed by: PEDIATRICS

## 2022-03-24 PROCEDURE — 4010F ACE/ARB THERAPY RXD/TAKEN: CPT | Mod: CPTII,S$GLB,, | Performed by: PEDIATRICS

## 2022-03-24 PROCEDURE — 1100F PTFALLS ASSESS-DOCD GE2>/YR: CPT | Mod: CPTII,S$GLB,, | Performed by: PEDIATRICS

## 2022-03-24 PROCEDURE — 99999 PR PBB SHADOW E&M-EST. PATIENT-LVL V: CPT | Mod: PBBFAC,,, | Performed by: PEDIATRICS

## 2022-03-24 PROCEDURE — 1100F PR PT FALLS ASSESS DOC 2+ FALLS/FALL W/INJURY/YR: ICD-10-PCS | Mod: CPTII,S$GLB,, | Performed by: PEDIATRICS

## 2022-03-24 PROCEDURE — 3079F DIAST BP 80-89 MM HG: CPT | Mod: CPTII,S$GLB,, | Performed by: PEDIATRICS

## 2022-03-24 PROCEDURE — 3288F FALL RISK ASSESSMENT DOCD: CPT | Mod: CPTII,S$GLB,, | Performed by: PEDIATRICS

## 2022-03-24 PROCEDURE — 3044F HG A1C LEVEL LT 7.0%: CPT | Mod: CPTII,S$GLB,, | Performed by: PEDIATRICS

## 2022-03-24 PROCEDURE — 3075F PR MOST RECENT SYSTOLIC BLOOD PRESS GE 130-139MM HG: ICD-10-PCS | Mod: CPTII,S$GLB,, | Performed by: PEDIATRICS

## 2022-03-24 PROCEDURE — 1160F RVW MEDS BY RX/DR IN RCRD: CPT | Mod: CPTII,S$GLB,, | Performed by: PEDIATRICS

## 2022-03-24 PROCEDURE — 99214 OFFICE O/P EST MOD 30 MIN: CPT | Mod: S$GLB,,, | Performed by: PEDIATRICS

## 2022-03-24 PROCEDURE — 1159F MED LIST DOCD IN RCRD: CPT | Mod: CPTII,S$GLB,, | Performed by: PEDIATRICS

## 2022-03-24 PROCEDURE — 3075F SYST BP GE 130 - 139MM HG: CPT | Mod: CPTII,S$GLB,, | Performed by: PEDIATRICS

## 2022-03-24 PROCEDURE — 1160F PR REVIEW ALL MEDS BY PRESCRIBER/CLIN PHARMACIST DOCUMENTED: ICD-10-PCS | Mod: CPTII,S$GLB,, | Performed by: PEDIATRICS

## 2022-03-24 PROCEDURE — 99999 PR PBB SHADOW E&M-EST. PATIENT-LVL V: ICD-10-PCS | Mod: PBBFAC,,, | Performed by: PEDIATRICS

## 2022-03-24 PROCEDURE — 1126F AMNT PAIN NOTED NONE PRSNT: CPT | Mod: CPTII,S$GLB,, | Performed by: PEDIATRICS

## 2022-03-24 PROCEDURE — 3008F BODY MASS INDEX DOCD: CPT | Mod: CPTII,S$GLB,, | Performed by: PEDIATRICS

## 2022-03-24 PROCEDURE — 99499 UNLISTED E&M SERVICE: CPT | Mod: HCNC,S$GLB,, | Performed by: PEDIATRICS

## 2022-03-24 PROCEDURE — 1159F PR MEDICATION LIST DOCUMENTED IN MEDICAL RECORD: ICD-10-PCS | Mod: CPTII,S$GLB,, | Performed by: PEDIATRICS

## 2022-03-24 PROCEDURE — 3079F PR MOST RECENT DIASTOLIC BLOOD PRESSURE 80-89 MM HG: ICD-10-PCS | Mod: CPTII,S$GLB,, | Performed by: PEDIATRICS

## 2022-03-24 PROCEDURE — 3044F PR MOST RECENT HEMOGLOBIN A1C LEVEL <7.0%: ICD-10-PCS | Mod: CPTII,S$GLB,, | Performed by: PEDIATRICS

## 2022-03-24 PROCEDURE — 99499 RISK ADDL DX/OHS AUDIT: ICD-10-PCS | Mod: HCNC,S$GLB,, | Performed by: PEDIATRICS

## 2022-03-24 PROCEDURE — 1126F PR PAIN SEVERITY QUANTIFIED, NO PAIN PRESENT: ICD-10-PCS | Mod: CPTII,S$GLB,, | Performed by: PEDIATRICS

## 2022-03-24 RX ORDER — TRAZODONE HYDROCHLORIDE 50 MG/1
50 TABLET ORAL NIGHTLY
Qty: 90 TABLET | Refills: 3 | Status: SHIPPED | OUTPATIENT
Start: 2022-03-24 | End: 2023-03-30

## 2022-03-24 NOTE — PROGRESS NOTES
Subjective:       Patient ID: Raghavendra Luz is a 71 y.o. male.    Chief Complaint: Establish Care    Raghavendra Luz is a 71 y.o. male who presents to clinic to establish care. R shoulder pain for a couple months due to falling in the bathtub. Also having cat mild sleep disturbance.     1) HTN: controlled. Back on medications. no HTNsive Sx  2) COPD: stable per pt. Intermittent sob, upcoming pulm  3) Hx PE/AF/CAD: was on eliquis/coreg  4) Severe glaucoma: seeing opht   5) LIPIDS:following D&E, tolerating and compliant with med(s).       LABS REVIEWED AND DISCUSSED WITH PATIENT: TSH, CBC, A1C, CMP, and lipid panel    Review of Systems   Constitutional: Negative for activity change, appetite change, chills, diaphoresis, fatigue, fever and unexpected weight change.   HENT: Negative for nasal congestion, ear pain, mouth sores, nosebleeds, postnasal drip, rhinorrhea, sneezing and sore throat.    Eyes: Negative for photophobia, pain, discharge, redness and visual disturbance.   Respiratory: Negative for cough, chest tightness, shortness of breath, wheezing and stridor.    Cardiovascular: Negative for chest pain, palpitations and leg swelling.   Gastrointestinal: Negative for abdominal distention, blood in stool, constipation, diarrhea, nausea and vomiting.   Genitourinary: Negative for decreased urine volume, difficulty urinating, dysuria, flank pain, frequency, genital sores, hematuria and urgency.   Musculoskeletal: Positive for arthralgias and myalgias. Negative for back pain, joint swelling, neck pain and neck stiffness.   Integumentary:  Negative for color change, pallor, rash and wound.   Neurological: Negative for dizziness, syncope, speech difficulty, weakness, light-headedness and headaches.   Hematological: Negative for adenopathy. Does not bruise/bleed easily.   Psychiatric/Behavioral: Positive for sleep disturbance. Negative for confusion, decreased concentration, dysphoric mood, hallucinations and suicidal  ideas. The patient is not nervous/anxious.    All other systems reviewed and are negative.        Objective:      Physical Exam  Vitals and nursing note reviewed.   Constitutional:       General: He is not in acute distress.     Appearance: He is well-developed.   Neck:      Thyroid: No thyromegaly.      Vascular: No JVD.   Cardiovascular:      Rate and Rhythm: Normal rate and regular rhythm.      Heart sounds: Normal heart sounds. No murmur heard.  Pulmonary:      Effort: Pulmonary effort is normal. No respiratory distress.      Breath sounds: Normal breath sounds. No wheezing or rales.   Abdominal:      General: There is no distension.      Palpations: Abdomen is soft. There is no mass.      Tenderness: There is no abdominal tenderness. There is no guarding.   Musculoskeletal:      Right lower leg: No edema.      Left lower leg: No edema.      Comments: Can not abduct shoulder >90deg and can not posterior/anterior rotate shoudler   Lymphadenopathy:      Cervical: No cervical adenopathy.   Skin:     Capillary Refill: Capillary refill takes less than 2 seconds.      Findings: No rash.   Neurological:      General: No focal deficit present.      Mental Status: He is alert and oriented to person, place, and time.      Cranial Nerves: No cranial nerve deficit.      Coordination: Coordination normal.   Psychiatric:         Mood and Affect: Mood normal.         Behavior: Behavior normal.         Thought Content: Thought content normal.         Judgment: Judgment normal.         Assessment:       Problem List Items Addressed This Visit     Chronic systolic heart failure    Atherosclerosis of native coronary artery of native heart without angina pectoris    Essential hypertension    ICD (implantable cardioverter-defibrillator) in place    Ischemic cardiomyopathy    Mixed hyperlipidemia - Primary    Relevant Orders    Lipid Panel    Comprehensive Metabolic Panel    Multiple subsegmental pulmonary emboli without acute cor  pulmonale    Permanent atrial fibrillation    Mixed simple and mucopurulent chronic bronchitis    Primary open angle glaucoma (POAG) of both eyes, severe stage    Hyponatremia    Primary insomnia    Relevant Medications    traZODone (DESYREL) 50 MG tablet      Other Visit Diagnoses     Encounter for hepatitis C screening test for low risk patient        Relevant Orders    Hepatitis C Antibody    Prostate cancer screening        Relevant Orders    PSA, Screening    Colon cancer screening        Relevant Orders    Cologuard Screening (Multitarget Stool DNA)    Coracoid impingement of right shoulder        Relevant Orders    Ambulatory referral/consult to Physical/Occupational Therapy          Plan:     Mixed hyperlipidemia  -     Lipid Panel; Future; Expected date: 03/24/2022  -     Comprehensive Metabolic Panel; Future; Expected date: 03/24/2022    Mixed simple and mucopurulent chronic bronchitis    Ischemic cardiomyopathy    ICD (implantable cardioverter-defibrillator) in place    Essential hypertension    Chronic systolic heart failure    Atherosclerosis of native coronary artery of native heart without angina pectoris    Multiple subsegmental pulmonary emboli without acute cor pulmonale    Permanent atrial fibrillation    Primary open angle glaucoma (POAG) of both eyes, severe stage    Hyponatremia    Primary insomnia  -     traZODone (DESYREL) 50 MG tablet; Take 1 tablet (50 mg total) by mouth every evening.  Dispense: 90 tablet; Refill: 3    Encounter for hepatitis C screening test for low risk patient  -     Hepatitis C Antibody; Future; Expected date: 03/24/2022    Prostate cancer screening  -     PSA, Screening; Future; Expected date: 03/24/2022    Colon cancer screening  -     Cologuard Screening (Multitarget Stool DNA); Future; Expected date: 03/24/2022    Coracoid impingement of right shoulder  -     Ambulatory referral/consult to Physical/Occupational Therapy; Future; Expected date: 03/31/2022    His  hyponatremia has been stable for >9 years, likely due to diuretic therapy, I will not work up due to stability. Colon cancer screening due, will opt for cologuard due to anticoagulation. If shoulder does not improve with PT, see ortho. Keep subspecialty care. Trazadone and sleep hygiene d/w patient. F/U 6 months with labs.  Scribe Attestation:   I, Darrian Clarke, am scribing for, and in the presence of, Dr. Jermain Dobson Jr. I performed the above scribed service and the documentation accurately describes the services I performed. I attest to the accuracy of the note.    I, Dr. Jermain Dobson Jr, reviewed documentation as scribed above. I personally performed the services described in this documentation.  I agree that the record reflects my personal performance and is accurate and complete. Jermain Dobson Jr., MD.  03/24/2022

## 2022-04-07 ENCOUNTER — OFFICE VISIT (OUTPATIENT)
Dept: OPHTHALMOLOGY | Facility: CLINIC | Age: 72
End: 2022-04-07
Payer: MEDICARE

## 2022-04-07 DIAGNOSIS — H25.13 NUCLEAR SCLEROSIS OF BOTH EYES: ICD-10-CM

## 2022-04-07 DIAGNOSIS — H26.9 CORTICAL CATARACT OF BOTH EYES: ICD-10-CM

## 2022-04-07 DIAGNOSIS — H40.1133 PRIMARY OPEN ANGLE GLAUCOMA (POAG) OF BOTH EYES, SEVERE STAGE: Primary | ICD-10-CM

## 2022-04-07 PROCEDURE — 92014 COMPRE OPH EXAM EST PT 1/>: CPT | Mod: S$GLB,,, | Performed by: OPHTHALMOLOGY

## 2022-04-07 PROCEDURE — 92083 HUMPHREY VISUAL FIELD - OU - BOTH EYES: ICD-10-PCS | Mod: S$GLB,,, | Performed by: OPHTHALMOLOGY

## 2022-04-07 PROCEDURE — 99999 PR PBB SHADOW E&M-EST. PATIENT-LVL I: CPT | Mod: PBBFAC,,, | Performed by: OPHTHALMOLOGY

## 2022-04-07 PROCEDURE — 99999 PR PBB SHADOW E&M-EST. PATIENT-LVL I: ICD-10-PCS | Mod: PBBFAC,,, | Performed by: OPHTHALMOLOGY

## 2022-04-07 PROCEDURE — 4010F ACE/ARB THERAPY RXD/TAKEN: CPT | Mod: CPTII,S$GLB,, | Performed by: OPHTHALMOLOGY

## 2022-04-07 PROCEDURE — 1160F PR REVIEW ALL MEDS BY PRESCRIBER/CLIN PHARMACIST DOCUMENTED: ICD-10-PCS | Mod: CPTII,S$GLB,, | Performed by: OPHTHALMOLOGY

## 2022-04-07 PROCEDURE — 1159F MED LIST DOCD IN RCRD: CPT | Mod: CPTII,S$GLB,, | Performed by: OPHTHALMOLOGY

## 2022-04-07 PROCEDURE — 1160F RVW MEDS BY RX/DR IN RCRD: CPT | Mod: CPTII,S$GLB,, | Performed by: OPHTHALMOLOGY

## 2022-04-07 PROCEDURE — 3044F PR MOST RECENT HEMOGLOBIN A1C LEVEL <7.0%: ICD-10-PCS | Mod: CPTII,S$GLB,, | Performed by: OPHTHALMOLOGY

## 2022-04-07 PROCEDURE — 3044F HG A1C LEVEL LT 7.0%: CPT | Mod: CPTII,S$GLB,, | Performed by: OPHTHALMOLOGY

## 2022-04-07 PROCEDURE — 92083 EXTENDED VISUAL FIELD XM: CPT | Mod: S$GLB,,, | Performed by: OPHTHALMOLOGY

## 2022-04-07 PROCEDURE — 1159F PR MEDICATION LIST DOCUMENTED IN MEDICAL RECORD: ICD-10-PCS | Mod: CPTII,S$GLB,, | Performed by: OPHTHALMOLOGY

## 2022-04-07 PROCEDURE — 92014 PR EYE EXAM, EST PATIENT,COMPREHESV: ICD-10-PCS | Mod: S$GLB,,, | Performed by: OPHTHALMOLOGY

## 2022-04-07 PROCEDURE — 4010F PR ACE/ARB THEARPY RXD/TAKEN: ICD-10-PCS | Mod: CPTII,S$GLB,, | Performed by: OPHTHALMOLOGY

## 2022-04-07 NOTE — PROGRESS NOTES
SUBJECTIVE  Raghavendra JUANA Luz is 71 y.o. male  Uncorrected distance visual acuity was 20/400 in the right eye and 20/200 in the left eye.   Chief Complaint   Patient presents with    Glaucoma     Pt reports for annual exam. Denies any pain but notices constant itchiness and flashes of lights. Va worsening. 100% compliant with gtts.           HPI     Glaucoma      Additional comments: Pt reports for annual exam. Denies any pain but   notices constant itchiness and flashes of lights. Va worsening. 100%   compliant with gtts.               Comments     Insurance changed from Dr. Schaefer  Self Referral-Dr. Carine Trejo was his previous doctor    1. Severe COAG Fhx Father goal=15-16  Glaucoma surgery OU 2009 (valve OU with Dr. Guzman)  2. NS OU  Cortical cataract     Rocklatan qhs OU  Brimonidine BID OU    Old Records- Neil  4/09 NCT 22/25 c/d 1.0/0.8  7/14 Ta 18/19  11/15 Ta 18/18 11/19 c/d 1.0/0.8 Ta 18/18 refer to [facility]            Last edited by Jan Licona on 4/7/2022  2:59 PM. (History)         Assessment /Plan :  1. Primary open angle glaucoma (POAG) of both eyes, severe stage Doing well, IOP within acceptable range relative to target IOP and no evidence of progression. Continue current treatment. Reviewed importance of continued compliance with treatment and follow up.    Best IOPs yet.  Patient instructed to continue using the following glaucoma medication as follows:  Rocklatan one drop in each eye nightly and Brimoninide one drop in each eye every 12 hours    Return to clinic in 3 months  or as needed.  With IOP Check       2. Cortical cataract of both eyes - monitor for now   3. Nuclear sclerosis of both eyes - monitor for now

## 2022-04-13 ENCOUNTER — TELEPHONE (OUTPATIENT)
Dept: INTERNAL MEDICINE | Facility: CLINIC | Age: 72
End: 2022-04-13
Payer: MEDICARE

## 2022-04-13 DIAGNOSIS — R19.5 POSITIVE COLORECTAL CANCER SCREENING USING COLOGUARD TEST: Primary | ICD-10-CM

## 2022-04-13 LAB — NONINV COLON CA DNA+OCC BLD SCRN STL QL: POSITIVE

## 2022-04-13 NOTE — TELEPHONE ENCOUNTER
----- Message from Coby Kyle sent at 4/13/2022  2:18 PM CDT -----  Regarding: RE: new pt referral for colonoscopy  Hey! With the new process, when a referral is entered, a third party company calls the PT about 48 hours after referral is entered to scheduled PAT call with our schedulers to get them scheduled for procedure. So if they order a referral they will automatically get a call. Just to let you know in the future. Thanks!  ----- Message -----  From: Shelly Aponte MA  Sent: 4/13/2022   1:57 PM CDT  To: [facility] Schedulers  Subject: new pt referral for colonoscopy                  Please call this pt for an colonoscopy to verify his positive cologuard test per dr. Dobson. Referral won't allow me to schedule.

## 2022-04-13 NOTE — PROGRESS NOTES
Your screening test for colon cancer is positive. You will need a colonoscopy to look to see if a true test result. Your anticoagulation will cause some timing issues. I want you to see the GI department so they can plan your colonoscopy with your cardiologist's approval. Who is your cardiologist? My staff will contact you.

## 2022-04-18 DIAGNOSIS — J41.8 MIXED SIMPLE AND MUCOPURULENT CHRONIC BRONCHITIS: Primary | ICD-10-CM

## 2022-04-19 ENCOUNTER — TELEPHONE (OUTPATIENT)
Dept: INTERNAL MEDICINE | Facility: CLINIC | Age: 72
End: 2022-04-19
Payer: MEDICARE

## 2022-04-19 NOTE — TELEPHONE ENCOUNTER
nurse stated he hasnt been there in 2 yrs so he has to be seen. they will call him for an  apt b4 recommending anything and dr number given.     ----- Message from ALISHA Dobson Jr., MD sent at 4/13/2022  3:58 PM CDT -----  Regarding: RE: cardiologist  Please contact his office, let his nurse know that he has positive cologuard for colon cancer and will be seeing GI for eventual colonoscopy and we would like recommendation for eliquis cessation and need for lovenox bridge. Give nurse direct office line and my [phone].  ----- Message -----  From: Shelly Aponte MA  Sent: 4/13/2022   1:59 PM CDT  To: ALISHA Dobson Jr., MD  Subject: cardiologist                                     Pt's cardiologist is dr. Brett Corcoran.       ----- Message -----  From: ALISHA Dobson Jr., MD  Sent: 4/13/2022  12:55 PM CDT  To: [facility] Staff    Your screening test for colon cancer is positive. You will need a colonoscopy to look to see if a true test result. Your anticoagulation will cause some timing issues. I want you to see the GI department so they can plan your colonoscopy with your cardiologist's approval. Who is your cardiologist? My staff will contact you.

## 2022-04-20 ENCOUNTER — HOSPITAL ENCOUNTER (OUTPATIENT)
Dept: RADIOLOGY | Facility: HOSPITAL | Age: 72
Discharge: HOME | End: 2022-04-20
Attending: INTERNAL MEDICINE
Payer: MEDICARE

## 2022-04-20 ENCOUNTER — OFFICE VISIT (OUTPATIENT)
Dept: PULMONOLOGY | Facility: CLINIC | Age: 72
End: 2022-04-20
Payer: MEDICARE

## 2022-04-20 VITALS
HEIGHT: 73 IN | DIASTOLIC BLOOD PRESSURE: 88 MMHG | OXYGEN SATURATION: 100 % | WEIGHT: 215.38 LBS | RESPIRATION RATE: 14 BRPM | SYSTOLIC BLOOD PRESSURE: 121 MMHG | HEART RATE: 64 BPM | BODY MASS INDEX: 28.54 KG/M2

## 2022-04-20 DIAGNOSIS — Z01.811 PRE-OPERATIVE RESPIRATORY EXAMINATION: ICD-10-CM

## 2022-04-20 DIAGNOSIS — I26.94 MULTIPLE SUBSEGMENTAL PULMONARY EMBOLI WITHOUT ACUTE COR PULMONALE: ICD-10-CM

## 2022-04-20 DIAGNOSIS — R09.02 EXERCISE HYPOXEMIA: ICD-10-CM

## 2022-04-20 DIAGNOSIS — R19.5 POSITIVE OCCULT STOOL BLOOD TEST: ICD-10-CM

## 2022-04-20 DIAGNOSIS — J41.8 MIXED SIMPLE AND MUCOPURULENT CHRONIC BRONCHITIS: ICD-10-CM

## 2022-04-20 DIAGNOSIS — F17.210 CIGARETTE SMOKER: Primary | ICD-10-CM

## 2022-04-20 PROCEDURE — 4010F ACE/ARB THERAPY RXD/TAKEN: CPT | Mod: CPTII,S$GLB,, | Performed by: INTERNAL MEDICINE

## 2022-04-20 PROCEDURE — 4010F PR ACE/ARB THEARPY RXD/TAKEN: ICD-10-PCS | Mod: CPTII,S$GLB,, | Performed by: INTERNAL MEDICINE

## 2022-04-20 PROCEDURE — 71046 X-RAY EXAM CHEST 2 VIEWS: CPT | Mod: TC

## 2022-04-20 PROCEDURE — 99999 PR PBB SHADOW E&M-EST. PATIENT-LVL V: ICD-10-PCS | Mod: PBBFAC,,, | Performed by: INTERNAL MEDICINE

## 2022-04-20 PROCEDURE — 3288F PR FALLS RISK ASSESSMENT DOCUMENTED: ICD-10-PCS | Mod: CPTII,S$GLB,, | Performed by: INTERNAL MEDICINE

## 2022-04-20 PROCEDURE — 99205 OFFICE O/P NEW HI 60 MIN: CPT | Mod: S$GLB,,, | Performed by: INTERNAL MEDICINE

## 2022-04-20 PROCEDURE — 3008F BODY MASS INDEX DOCD: CPT | Mod: CPTII,S$GLB,, | Performed by: INTERNAL MEDICINE

## 2022-04-20 PROCEDURE — 99999 PR PBB SHADOW E&M-EST. PATIENT-LVL V: CPT | Mod: PBBFAC,,, | Performed by: INTERNAL MEDICINE

## 2022-04-20 PROCEDURE — 99499 RISK ADDL DX/OHS AUDIT: ICD-10-PCS | Mod: HCNC,S$GLB,, | Performed by: INTERNAL MEDICINE

## 2022-04-20 PROCEDURE — 3079F PR MOST RECENT DIASTOLIC BLOOD PRESSURE 80-89 MM HG: ICD-10-PCS | Mod: CPTII,S$GLB,, | Performed by: INTERNAL MEDICINE

## 2022-04-20 PROCEDURE — 71046 X-RAY EXAM CHEST 2 VIEWS: CPT | Mod: 26,,, | Performed by: RADIOLOGY

## 2022-04-20 PROCEDURE — 1159F MED LIST DOCD IN RCRD: CPT | Mod: CPTII,S$GLB,, | Performed by: INTERNAL MEDICINE

## 2022-04-20 PROCEDURE — 99499 UNLISTED E&M SERVICE: CPT | Mod: HCNC,S$GLB,, | Performed by: INTERNAL MEDICINE

## 2022-04-20 PROCEDURE — 3044F PR MOST RECENT HEMOGLOBIN A1C LEVEL <7.0%: ICD-10-PCS | Mod: CPTII,S$GLB,, | Performed by: INTERNAL MEDICINE

## 2022-04-20 PROCEDURE — 3288F FALL RISK ASSESSMENT DOCD: CPT | Mod: CPTII,S$GLB,, | Performed by: INTERNAL MEDICINE

## 2022-04-20 PROCEDURE — 99205 PR OFFICE/OUTPT VISIT, NEW, LEVL V, 60-74 MIN: ICD-10-PCS | Mod: S$GLB,,, | Performed by: INTERNAL MEDICINE

## 2022-04-20 PROCEDURE — 3044F HG A1C LEVEL LT 7.0%: CPT | Mod: CPTII,S$GLB,, | Performed by: INTERNAL MEDICINE

## 2022-04-20 PROCEDURE — 3008F PR BODY MASS INDEX (BMI) DOCUMENTED: ICD-10-PCS | Mod: CPTII,S$GLB,, | Performed by: INTERNAL MEDICINE

## 2022-04-20 PROCEDURE — 1159F PR MEDICATION LIST DOCUMENTED IN MEDICAL RECORD: ICD-10-PCS | Mod: CPTII,S$GLB,, | Performed by: INTERNAL MEDICINE

## 2022-04-20 PROCEDURE — 3079F DIAST BP 80-89 MM HG: CPT | Mod: CPTII,S$GLB,, | Performed by: INTERNAL MEDICINE

## 2022-04-20 PROCEDURE — 71046 XR CHEST PA AND LATERAL: ICD-10-PCS | Mod: 26,,, | Performed by: RADIOLOGY

## 2022-04-20 PROCEDURE — 3074F SYST BP LT 130 MM HG: CPT | Mod: CPTII,S$GLB,, | Performed by: INTERNAL MEDICINE

## 2022-04-20 PROCEDURE — 3074F PR MOST RECENT SYSTOLIC BLOOD PRESSURE < 130 MM HG: ICD-10-PCS | Mod: CPTII,S$GLB,, | Performed by: INTERNAL MEDICINE

## 2022-04-20 PROCEDURE — 1100F PTFALLS ASSESS-DOCD GE2>/YR: CPT | Mod: CPTII,S$GLB,, | Performed by: INTERNAL MEDICINE

## 2022-04-20 PROCEDURE — 1100F PR PT FALLS ASSESS DOC 2+ FALLS/FALL W/INJURY/YR: ICD-10-PCS | Mod: CPTII,S$GLB,, | Performed by: INTERNAL MEDICINE

## 2022-04-20 RX ORDER — ALBUTEROL SULFATE 90 UG/1
2 AEROSOL, METERED RESPIRATORY (INHALATION) EVERY 4 HOURS PRN
Qty: 18 G | Refills: 11 | Status: SHIPPED | OUTPATIENT
Start: 2022-04-20

## 2022-04-20 NOTE — PROGRESS NOTES
Subjective:     Patient ID: Raghavendra Luz is a 71 y.o. male.    Preop evaluation     Chief Complaint:  Preop for colonscopy    HPI     Dyspnea  Patient complains of shortness of breath. Symptoms occur while getting dressed, with one block walking. Symptoms began 1 year ago, gradually worsening since. Associated symptoms include  dyspnea on exertion, productive cough and shortness of breath. He denies chest pain, located left chest. He does not have had recent travel. Weight has been stable. Symptoms are exacerbated by moderate activity. Symptoms are alleviated by rest.     history of Pulmonary Embolism  Hx of dvt - left leg  On Eliquis  Followed by Dr. Butler - cardiology    Positive occult blood test (On eliquis)  Hx of polyps of colon in brother      Past Medical History:   Diagnosis Date    Cataract     Glaucoma      Past Surgical History:   Procedure Laterality Date    ANKLE FRACTURE SURGERY Left     COLONOSCOPY      CORONARY ANGIOPLASTY WITH STENT PLACEMENT       Review of patient's allergies indicates:   Allergen Reactions    Iodine Anaphylaxis     Shellfish allergy - pt confirms can take Iodine products.    Shellfish containing products Anaphylaxis    Olmesartan Hives     Current Outpatient Medications on File Prior to Visit   Medication Sig Dispense Refill    aspirin (ECOTRIN) 81 MG EC tablet Take 81 mg by mouth.      atorvastatin (LIPITOR) 80 MG tablet Take 1 tablet (80 mg total) by mouth once daily. 90 tablet 1    brimonidine 0.2% (ALPHAGAN) 0.2 % Drop Place 1 drop into both eyes every 12 (twelve) hours. 10 mL 12    carvediloL (COREG) 25 MG tablet Take 1 tablet (25 mg total) by mouth 2 (two) times daily with meals. 180 tablet 1    lisinopriL (PRINIVIL,ZESTRIL) 40 MG tablet Take 1 tablet (40 mg total) by mouth once daily. 90 tablet 1    netarsudiL-latanoprost (ROCKLATAN) 0.02-0.005 % Drop Place 1 drop into both eyes every evening. 2.5 mL 12    spironolactone (ALDACTONE) 100 MG tablet Take 1  "tablet (100 mg total) by mouth once daily. 90 tablet 1    traZODone (DESYREL) 50 MG tablet Take 1 tablet (50 mg total) by mouth every evening. 90 tablet 3    [DISCONTINUED] ELIQUIS 5 mg Tab Take 1 tablet (5 mg total) by mouth 2 (two) times daily. 180 tablet 1    furosemide (LASIX) 40 MG tablet Take 1 tablet (40 mg total) by mouth daily as needed (swelling). (Patient not taking: Reported on 4/20/2022) 90 tablet 0    nitroGLYCERIN (NITROSTAT) 0.4 MG SL tablet PLACE 1 TABLET UNDER THE TONGUE EVERY 5 (FIVE) MINUTES AS NEEDED FOR CHEST PAIN.MAX 3 DOSES IN15 MIN (Patient not taking: Reported on 4/20/2022) 20 tablet 0     No current facility-administered medications on file prior to visit.     Social History     Socioeconomic History    Marital status: Single   Tobacco Use    Smoking status: Current Every Day Smoker     Packs/day: 0.75     Years: 54.00     Pack years: 40.50     Types: Cigarettes     Start date: 1/1/1970    Smokeless tobacco: Never Used   Substance and Sexual Activity    Alcohol use: Yes    Drug use: Never    Sexual activity: Not Currently   Social History Narrative    Lives alone, no HH as of 03/2022. No other smokers or pets in household.     Family History   Problem Relation Age of Onset    Heart disease Mother     Ovarian cancer Mother     Diabetes Father     Heart disease Father     Brain Hemorrhage Sister     Heart disease Brother        Review of Systems   Constitutional: Positive for fatigue.   Respiratory: Positive for shortness of breath and dyspnea on extertion.    Cardiovascular: Positive for leg swelling.   Musculoskeletal: Positive for arthralgias.       Objective:      /88   Pulse 64   Resp 14   Ht 6' 1" (1.854 m)   Wt 97.7 kg (215 lb 6.2 oz)   SpO2 100%   BMI 28.42 kg/m²   Physical Exam  Vitals and nursing note reviewed.   Constitutional:       Appearance: He is well-developed.   HENT:      Head: Normocephalic and atraumatic.      Nose: Congestion present.   Eyes: "      Conjunctiva/sclera: Conjunctivae normal.      Pupils: Pupils are equal, round, and reactive to light.   Neck:      Thyroid: No thyromegaly.      Vascular: No JVD.      Trachea: No tracheal deviation.   Cardiovascular:      Rate and Rhythm: Normal rate. Rhythm irregular.      Heart sounds: Normal heart sounds.   Pulmonary:      Effort: Pulmonary effort is normal.      Breath sounds: Decreased breath sounds present.   Abdominal:      Palpations: Abdomen is soft.   Musculoskeletal:         General: Normal range of motion.      Cervical back: Neck supple.   Lymphadenopathy:      Cervical: No cervical adenopathy.   Skin:     General: Skin is warm and dry.   Neurological:      Mental Status: He is alert and oriented to person, place, and time.       Personal Diagnostic Review      Pulmonary Studies Review 4/20/2022   SpO2 100   Height 73   Weight 3446.23   BMI (Calculated) 28.4   Predicted Distance 341.88   Predicted Distance Meters (Calculated) 571.13       Recinos Visual Field - OU - Extended - Both Eyes  Right Eye  Reliability was poor. Worsening.     Left Eye  Reliability was borderline. Findings include inferior altitudinal defect.   Improved.       Office Spirometry Results:     No flowsheet data found.  Pulmonary Studies Review 4/20/2022   SpO2 100   Height 73   Weight 3446.23   BMI (Calculated) 28.4   Predicted Distance 341.88   Predicted Distance Meters (Calculated) 571.13     Echo Complete     Ref Range & Units 1 yr ago   LVIDD cm 4.45    LVIDS cm 3.11    FS percent 30.10    IVS cm 1.29    PW cm 1.11    Echo EF Estimated percent 58.00    E/E' ratio  13.10    MV Peak A Kalia cm/sec 63.70    MV Peak E Kalia cm/sec 60.20    E/A ratio ratio 0.90    AV peak gradient mmHg 4.00    Resulting Agency  [email or web address]     Narrative  Performed by [email or web address]  This result has an attachment that is not available.   Transthoracic Echocardiographic Report     Patient Name: JACKELINE HOOPERJUANA Patient ID: [id number] Account #:    : 1950 (70y 9m) Gender: M Study Date: 2021 10:00:26 AM   Ht(Cm): 191 Wt(Kg): 102.06 BSA: 2.33 Accession #: [id number]   Sonographer: Location: [facility] Provider: JILLIAN TOMLINSON     Heart Rate: 64 Quality: Technically difficult study due to limited acoustic windows.   Ref.Provider: JILLIAN TOMLINSON     -----------------------    CONCLUSIONS:   1. Normal left ventricular cavity size. Moderately depressed left ventricular systolic   function. LVEF 30 - 35%. Akinesis of mid-distal septum and apex.   2. Mild tricuspid valve regurgitation.     -----------------------    PROCEDURES:   Echocardiographic Report: Transthoracic complete echo, 2D, spectral and tissue Doppler,   color flow Doppler, M-mode.   Sonographer: ALEXANDREA Mix, RVT     -----------------------    INDICATIONS:   Atrial fibrillation/flutter, Chronic obstructive pulmonary disease, Hypertension,   Implantable cardiovertor/defibrillator, and Syncope.     -----------------------    FINDINGS:   Left Ventricle: Normal left ventricular cavity size. Moderately depressed left   ventricular systolic function. LVEF 30 - 35%. Mild asymmetric septal hypertrophy.   Akinesis of mid-distal septum and apex.   Right Ventricle: Normal right ventricular size. Normal right ventricular systolic   function. A pacemaker or ICD lead is noted in the right ventricle.   Left Atrium: The left atrium is not well visualized.   Right Atrium: The right atrium is normal in size.   Mitral Valve: The mitral valve is not well visualized. No or trivial mitral valve   regurgitation. No mitral valve stenosis.   Aortic Valve: No or trivial aortic valve regurgitation. No aortic valve stenosis. Aortic   valve cusps appear mildly calcified. AV peak PG 4 mmHg   Tricuspid Valve: Mild tricuspid valve regurgitation. The estimated right ventricular   systolic pressure/PASP is <35 mmHg.   Pulmonic Valve: The pulmonic valve is not well visualized.   Pericardium: Normal pericardium  without evidence of pericardial effusion.   Aorta: Normal aortic root size.   IVC: Normal IVC size with >50% collapse with inspiration. Normal estimated RAP around 0-5   mmHg.     -----------------------    MEASUREMENTS:   2D/MM Value Doppler Value   LVIDd 2D 4.45 cm AV Peak Kalia 103.00 cm/sec   LVIDs 2D 3.11 cm AV Peak PG 4.00 mmHg   LV FS Teich 2D 30.10 % LVOT Peak Kalia 79.50 cm/sec   IVSd 2D 1.29 cm LVOT Peak PG 3.00 mmHg   LVPWd 2D 1.11 cm MV E Peak Kalia 60.20 cm/sec   IVS/LVPW 2D 1.16 ratio MV A Peak Kalia 63.70 cm/sec   LV Mass 2D 197.52 g MV E/A 0.90 ratio   LV Mass Index 2D 84.77 g/m2 Med E` Kalia 4.58 cm/sec   RWT 0.50 Lat E` Kalia 5.75 cm/sec   EDV 2D 90.05 mL Med E/E` Ratio 13.14   ESV 2D 38.21 mL Lateral E/E' 10.47   EF Teich 2D 58 % Average E/E` 11.81   RVDd 2d 4.06 cm TR Peak Kaila 223.00 cm/sec   TAPSE 1.60 cm TR Peak PG 20.00 mmHg   IVC Diam 1.70 sec   AoR Diam 2D 3.50 cm     Electronically Signed By:   Gómez Marsh   2021 19:27:08 CST   CC:     CC:     Transthoracic Echocardiographic Report    Procedure Note    Gómez Marsh MD - 2021   Formatting of this note might be different from the original.   Transthoracic Echocardiographic Report     Patient Name: JACKELINE HOOPER R Patient ID: [id number] Account #:   : 1950 (70y 9m) Gender: M Study Date: 2021 10:00:26 AM   Ht(Cm): 191 Wt(Kg): 102.06 BSA: 2.33 Accession #: [id number]   Sonographer: Location: [facility] Provider: JILLIAN TOMLINSON     Heart Rate: 64 Quality: Technically difficult study due to limited acoustic windows.   Ref.Provider: JILLIAN TOMILNSON     -----------------------    CONCLUSIONS:   1. Normal left ventricular cavity size. Moderately depressed left ventricular systolic   function. LVEF 30 - 35%. Akinesis of mid-distal septum and apex.   2. Mild tricuspid valve regurgitation.     -----------------------    PROCEDURES:   Echocardiographic Report: Transthoracic complete echo, 2D, spectral and tissue Doppler,   color flow  Doppler, M-mode.   Sonographer: Beronica Aguilera [facility], RVT     -----------------------    INDICATIONS:   Atrial fibrillation/flutter, Chronic obstructive pulmonary disease, Hypertension,   Implantable cardiovertor/defibrillator, and Syncope.     -----------------------    FINDINGS:   Left Ventricle: Normal left ventricular cavity size. Moderately depressed left   ventricular systolic function. LVEF 30 - 35%. Mild asymmetric septal hypertrophy.   Akinesis of mid-distal septum and apex.   Right Ventricle: Normal right ventricular size. Normal right ventricular systolic   function. A pacemaker or ICD lead is noted in the right ventricle.   Left Atrium: The left atrium is not well visualized.   Right Atrium: The right atrium is normal in size.   Mitral Valve: The mitral valve is not well visualized. No or trivial mitral valve   regurgitation. No mitral valve stenosis.   Aortic Valve: No or trivial aortic valve regurgitation. No aortic valve stenosis. Aortic   valve cusps appear mildly calcified. AV peak PG 4 mmHg   Tricuspid Valve: Mild tricuspid valve regurgitation. The estimated right ventricular   systolic pressure/PASP is <35 mmHg.   Pulmonic Valve: The pulmonic valve is not well visualized.   Pericardium: Normal pericardium without evidence of pericardial effusion.   Aorta: Normal aortic root size.   IVC: Normal IVC size with >50% collapse with inspiration. Normal estimated RAP around 0-5    mmHg.     -----------------------    MEASUREMENTS:   2D/MM Value Doppler Value   LVIDd 2D 4.45 cm AV Peak Kalia 103.00 cm/sec   LVIDs 2D 3.11 cm AV Peak PG 4.00 mmHg   LV FS Teich 2D 30.10 % LVOT Peak Kalia 79.50 cm/sec   IVSd 2D 1.29 cm LVOT Peak PG 3.00 mmHg   LVPWd 2D 1.11 cm MV E Peak Kalia 60.20 cm/sec   IVS/LVPW 2D 1.16 ratio MV A Peak Kalia 63.70 cm/sec   LV Mass 2D 197.52 g MV E/A 0.90 ratio   LV Mass Index 2D 84.77 g/m2 Med E` Kalia 4.58 cm/sec   RWT 0.50 Lat E` Kalia 5.75 cm/sec   EDV 2D 90.05 mL Med E/E` Ratio 13.14   ESV 2D 38.21  mL Lateral E/E' 10.47   EF Teich 2D 58 % Average E/E` 11.81   RVDd 2d 4.06 cm TR Peak Kalia 223.00 cm/sec   TAPSE 1.60 cm TR Peak PG 20.00 mmHg   IVC Diam 1.70 sec   AoR Diam 2D 3.50 cm     Electronically Signed By:   Gómez Marsh   2021-02-09 19:27:08 CST   CC:     CC:     Transthoracic Echocardiographic Report  Specimen Collected: 02/09/21 10:00 AM Last Resulted: 02/09/21  7:27 PM   Received From: [facility] and Its Subsidiaries and Affiliates  Result Received: 01/21/22  3:13 PM              CT Angiogram Chest    Anatomical Region Laterality Modality   Chest -- Computed Tomography       Impression      1.  Positive scan for pulmonary embolism involving subsegmental branches of both lower lobe pulmonary arteries, as discussed above. There is no saddle embolus.     2.  Mild cardiomegaly with prominent coronary artery calcification and with evidence of myocardial calcification along the left ventricle, suggestive of a previous myocardial infarction. A transvenous, unipolar cardiac defibrillator is in place.     3.  Mild dilatation of the main pulmonary artery outflow tract, suggestive of mild pulmonary arterial hypertension.     4.  Small left pleural effusion with probable atelectasis and/or parenchymal scarring in both posterior costophrenic sulci, left side greater than right.     5.  Focal minimal aneurysmal dilatation of the distal aortic arch/proximal descending thoracic aorta, as discussed above, measuring 3.6 cm in diameter.     6.  Mild, diffuse, fatty infiltration of the liver.    Narrative    This result has an attachment that is not available.   CTA Chest w/wo IV Contrast     CLINICAL INDICATION: chest pain and shortness of breath     COMPARISON: Two-view chest radiograph and left rib series obtained on 2/25/2020; 2/11/2011 CT angiogram of the pulmonary arteries     TECHNIQUE: Thin section spiral CT acquisition of the pulmonary arteries was obtained in the arterial  phase of contrast administration. Multiplanar MIP reconstructions and 3-D reconstructions were also performed. Automated exposure control was used for dose reduction.     FINDINGS: There are intraluminal filling defects in the left lower lobe pulmonary arterial subsegmental branches, particularly those supplying the medial aspect of the left lung base. A filling defect is also seen within a subsegmental branch of the lateral basal segment of the right lower lobe pulmonary artery. Each of these abnormalities is evident on image 74 of coronal series 5.     The ascending aorta is normal in size. There is focal mild aneurysmal dilatation of the distal portion of the aortic arch as it becomes the descending thoracic aorta, measuring up to 3.6 cm in diameter. There is moderate calcific atherosclerotic plaque in the aortic arch, which is partially uncoiled.     Small blebs and/or cysts are seen in the subpleural region of the right pulmonary apex and posteriorly at the left pulmonary apex. There is some groundglass opacity in the left posterior costophrenic sulcus with a small associated left pleural effusion, with only minor groundglass opacity in the right posterior costophrenic sulcus, which could be related to atelectasis.     There is a nonenlarged, 1.3 x 1.0 cm right hilar lymph node. No definite mediastinal or hilar adenopathy is demonstrated.     Included portions of the thyroid gland are normal.     There is mild cardiomegaly with prominent coronary artery calcification but with no pericardial effusion. The main pulmonary artery outflow tract is mildly dilated, measuring 3.0 cm in diameter, suggestive of some underlying pulmonary arterial hypertension.     In the included portions of the upper abdomen, there is diffuse diminished density in the liver, probably related to fatty infiltration. Some ill-defined increased density dependently in the gallbladder could be related to dense bile or to sludge. The spleen,  pancreas, adrenal glands, and visualized upper kidneys are normal.     There is a sinuous, rotatory spinal scoliosis, convexity to the left at the cervicothoracic junction and convexity to the right in the lower thoracic spine.    Procedure Note    Herminio Leon MD - 02/26/2020   Formatting of this note might be different from the original.   CTA Chest w/wo IV Contrast     CLINICAL INDICATION: chest pain and shortness of breath     COMPARISON: Two-view chest radiograph and left rib series obtained on 2/25/2020; 2/11/2011 CT angiogram of the pulmonary arteries     TECHNIQUE: Thin section spiral CT acquisition of the pulmonary arteries was obtained in the arterial phase of contrast administration. Multiplanar MIP reconstructions and 3-D reconstructions were also performed. Automated exposure control was used for dose reduction.     FINDINGS: There are intraluminal filling defects in the left lower lobe pulmonary arterial subsegmental branches, particularly those supplying the medial aspect of the left lung base. A filling defect is also seen within a subsegmental branch of the lateral basal segment of the right lower lobe pulmonary artery. Each of these abnormalities is evident on image 74 of coronal series 5.     The ascending aorta is normal in size. There is focal mild aneurysmal dilatation of the distal portion of the aortic arch as it becomes the descending thoracic aorta, measuring up to 3.6 cm in diameter. There is moderate calcific atherosclerotic plaque in the aortic arch, which is partially uncoiled.     Small blebs and/or cysts are seen in the subpleural region of the right pulmonary apex and posteriorly at the left pulmonary apex. There is some groundglass opacity in the left posterior costophrenic sulcus with a small associated left pleural effusion, with only minor groundglass opacity in the right posterior costophrenic sulcus, which could be related to atelectasis.     There is a nonenlarged,  1.3 x 1.0 cm right hilar lymph node. No definite mediastinal or hilar adenopathy is demonstrated.     Included portions of the thyroid gland are normal.     There is mild cardiomegaly with prominent coronary artery calcification but with no pericardial effusion. The main pulmonary artery outflow tract is mildly dilated, measuring 3.0 cm in diameter, suggestive of some underlying pulmonary arterial hypertension.     In the included portions of the upper abdomen, there is diffuse diminished density in the liver, probably related to fatty infiltration. Some ill-defined increased density dependently in the gallbladder could be related to dense bile or to sludge. The spleen, pancreas, adrenal glands, and visualized upper kidneys are normal.     There is a sinuous, rotatory spinal scoliosis, convexity to the left at the cervicothoracic junction and convexity to the right in the lower thoracic spine.     IMPRESSION:     1.  Positive scan for pulmonary embolism involving subsegmental branches of both lower lobe pulmonary arteries, as discussed above. There is no saddle embolus.     2.  Mild cardiomegaly with prominent coronary artery calcification and with evidence of myocardial calcification along the left ventricle, suggestive of a previous myocardial infarction. A transvenous, unipolar cardiac defibrillator is in place.     3.  Mild dilatation of the main pulmonary artery outflow tract, suggestive of mild pulmonary arterial hypertension.     4.  Small left pleural effusion with probable atelectasis and/or parenchymal scarring in both posterior costophrenic sulci, left side greater than right.     5.  Focal minimal aneurysmal dilatation of the distal aortic arch/proximal descending thoracic aorta, as discussed above, measuring 3.6 cm in diameter.     6.  Mild, diffuse, fatty infiltration of the liver.  Exam End: 02/26/20  4:21 AM Last Resulted: 02/26/20  4:50 AM   Received From: [facility] and Its Subsidiaries and Affiliates  Result Received: 03/03/22  9:30 AM   View Encounter             Assessment:       Pre-operative respiratory examination  Clearance for surgery: Colonoscopy  Needs to stop Eliquis 2 days prior to procedure  The patient is at an increased risk of complications from surgery due to multiple medical problems including COPD. Vaccination status reviewed and updated. Risks of possible complications of surgery discussed in detail including risk of respiratory failure requiring mechanical ventilation, post operative pneumonia, deep venous thrombosis, pulmonary embolism and death.  Pulmonary function studies, arterial blood gases and chest X ray reports are independently reviewed. Methods of improving lung function prior to surgery including incentive spirometry, regular use of bronchodilators, exercise and respiratory toilet discussed. Patient voices understanding of increased risks involved due to compromised pulmonary status and the need to comply with treatment plan prior to surgery.  The patient is cleared for anesthesia and surgery from a pulmonary standpoint.      Cigarette smoker  -     Ambulatory referral/consult to Smoking Cessation Program; Future; Expected date: 04/27/2022    Mixed simple and mucopurulent chronic bronchitis  -     Ambulatory referral/consult to Pulmonology  -     albuterol (PROVENTIL/VENTOLIN HFA) 90 mcg/actuation inhaler; Inhale 2 puffs into the lungs every 4 (four) hours as needed for Wheezing or Shortness of Breath.  Dispense: 18 g; Refill: 11  -     Complete PFT with bronchodilator; Future; Expected date: 04/20/2022  -     Six Minute Walk Test to qualify for Home Oxygen; Future    Multiple subsegmental pulmonary emboli without acute cor pulmonale  -     Ambulatory referral/consult to Pulmonology  -     apixaban (ELIQUIS) 5 mg Tab; Take 1 tablet (5 mg total) by mouth 2 (two) times daily.  Dispense: 180 tablet; Refill: 3  -     Complete PFT with  bronchodilator; Future; Expected date: 04/20/2022    Positive occult stool blood test  -     Ambulatory referral/consult to Endo Procedure ; Future; Expected date: 04/21/2022    Exercise hypoxemia  -     Six Minute Walk Test to qualify for Home Oxygen; Future    Pre-operative respiratory examination          Outpatient Encounter Medications as of 4/20/2022   Medication Sig Dispense Refill    aspirin (ECOTRIN) 81 MG EC tablet Take 81 mg by mouth.      atorvastatin (LIPITOR) 80 MG tablet Take 1 tablet (80 mg total) by mouth once daily. 90 tablet 1    brimonidine 0.2% (ALPHAGAN) 0.2 % Drop Place 1 drop into both eyes every 12 (twelve) hours. 10 mL 12    carvediloL (COREG) 25 MG tablet Take 1 tablet (25 mg total) by mouth 2 (two) times daily with meals. 180 tablet 1    lisinopriL (PRINIVIL,ZESTRIL) 40 MG tablet Take 1 tablet (40 mg total) by mouth once daily. 90 tablet 1    netarsudiL-latanoprost (ROCKLATAN) 0.02-0.005 % Drop Place 1 drop into both eyes every evening. 2.5 mL 12    spironolactone (ALDACTONE) 100 MG tablet Take 1 tablet (100 mg total) by mouth once daily. 90 tablet 1    traZODone (DESYREL) 50 MG tablet Take 1 tablet (50 mg total) by mouth every evening. 90 tablet 3    [DISCONTINUED] ELIQUIS 5 mg Tab Take 1 tablet (5 mg total) by mouth 2 (two) times daily. 180 tablet 1    albuterol (PROVENTIL/VENTOLIN HFA) 90 mcg/actuation inhaler Inhale 2 puffs into the lungs every 4 (four) hours as needed for Wheezing or Shortness of Breath. 18 g 11    apixaban (ELIQUIS) 5 mg Tab Take 1 tablet (5 mg total) by mouth 2 (two) times daily. 180 tablet 3    furosemide (LASIX) 40 MG tablet Take 1 tablet (40 mg total) by mouth daily as needed (swelling). (Patient not taking: Reported on 4/20/2022) 90 tablet 0    nitroGLYCERIN (NITROSTAT) 0.4 MG SL tablet PLACE 1 TABLET UNDER THE TONGUE EVERY 5 (FIVE) MINUTES AS NEEDED FOR CHEST PAIN.MAX 3 DOSES IN15 MIN (Patient not taking: Reported on 4/20/2022) 20 tablet  0     No facility-administered encounter medications on file as of 4/20/2022.     Plan:       Requested Prescriptions     Signed Prescriptions Disp Refills    albuterol (PROVENTIL/VENTOLIN HFA) 90 mcg/actuation inhaler 18 g 11     Sig: Inhale 2 puffs into the lungs every 4 (four) hours as needed for Wheezing or Shortness of Breath.    apixaban (ELIQUIS) 5 mg Tab 180 tablet 3     Sig: Take 1 tablet (5 mg total) by mouth 2 (two) times daily.     Problem List Items Addressed This Visit     Mixed simple and mucopurulent chronic bronchitis    Relevant Medications    albuterol (PROVENTIL/VENTOLIN HFA) 90 mcg/actuation inhaler    Other Relevant Orders    Complete PFT with bronchodilator    Six Minute Walk Test to qualify for Home Oxygen    Multiple subsegmental pulmonary emboli without acute cor pulmonale    Overview     Last Assessment & Plan:   Formatting of this note might be different from the original.  History & Physical US leg negative for DVT.  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  Patient is medically stable (no need for supplemental oxygen, not using accessory muscles, troponin not elevated) so will simply re-start his eliquis.  SOB likely from PE but also has wheezing so will treat for COPD exacerbation.  Patient has been out of medication for 2 weeks so will resume his eliquis tonight (received 100 mg lovenox this morning).  Consult medical management to help with medications.    Discharge Summary Ultrasound negative for DVT.  CTA did show left lower and right lower lobe pulmonary embolism.  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  Patient will go home with Eliquis 10 mg twice a day to complete a 7-day course and then start 5 mg twice a day.  He also had some COPD exacerbation which pretty much has resolved.    Follow-up Continue eliquis           Relevant Medications    apixaban (ELIQUIS) 5 mg Tab    Other Relevant Orders    Complete PFT with  bronchodilator    Pre-operative respiratory examination    Current Assessment & Plan     Clearance for surgery: Colonoscopy  Needs to stop Eliquis 2 days prior to procedure  The patient is at an increased risk of complications from surgery due to multiple medical problems including COPD. Vaccination status reviewed and updated. Risks of possible complications of surgery discussed in detail including risk of respiratory failure requiring mechanical ventilation, post operative pneumonia, deep venous thrombosis, pulmonary embolism and death.  Pulmonary function studies, arterial blood gases and chest X ray reports are independently reviewed. Methods of improving lung function prior to surgery including incentive spirometry, regular use of bronchodilators, exercise and respiratory toilet discussed. Patient voices understanding of increased risks involved due to compromised pulmonary status and the need to comply with treatment plan prior to surgery.  The patient is cleared for anesthesia and surgery from a pulmonary standpoint.               Other Visit Diagnoses     Cigarette smoker    -  Primary    Relevant Orders    Ambulatory referral/consult to Smoking Cessation Program    Positive occult stool blood test        Relevant Orders    Ambulatory referral/consult to Endo Procedure     Exercise hypoxemia        Relevant Orders    Six Minute Walk Test to qualify for Home Oxygen             Follow up in about 2 months (around 6/20/2022) for PFT on return, 6 min walk - on return.    MEDICAL DECISION MAKING: Moderate to high complexity.  Overall, the multiple problems listed are of moderate to high severity that may impact quality of life and activities of daily living. Side effects of medications, treatment plan as well as options and alternatives reviewed and discussed with patient. There was counseling of patient concerning these issues.    Total time spent in counseling and coordination of care - 60  minutes of  total time spent on the encounter, which includes face to face time and non-face to face time preparing to see the patient (eg, review of tests), Obtaining and/or reviewing separately obtained history, Documenting clinical information in the electronic or other health record, Independently interpreting results (not separately reported) and communicating results to the patient/family/caregiver, or Care coordination (not separately reported).    Time was used in discussion of prognosis, risks, benefits of treatment, instructions and compliance with regimen . Discussion with other physicians and/or health care providers - home health or for use of durable medical equipment (oxygen, nebulizers, CPAP, BiPAP) occurred.

## 2022-04-20 NOTE — ASSESSMENT & PLAN NOTE
Clearance for surgery: Colonoscopy  Needs to stop Eliquis 2 days prior to procedure  The patient is at an increased risk of complications from surgery due to multiple medical problems including COPD. Vaccination status reviewed and updated. Risks of possible complications of surgery discussed in detail including risk of respiratory failure requiring mechanical ventilation, post operative pneumonia, deep venous thrombosis, pulmonary embolism and death.  Pulmonary function studies, arterial blood gases and chest X ray reports are independently reviewed. Methods of improving lung function prior to surgery including incentive spirometry, regular use of bronchodilators, exercise and respiratory toilet discussed. Patient voices understanding of increased risks involved due to compromised pulmonary status and the need to comply with treatment plan prior to surgery.  The patient is cleared for anesthesia and surgery from a pulmonary standpoint.

## 2022-04-22 ENCOUNTER — TELEPHONE (OUTPATIENT)
Dept: INTERNAL MEDICINE | Facility: CLINIC | Age: 72
End: 2022-04-22
Payer: MEDICARE

## 2022-04-22 NOTE — TELEPHONE ENCOUNTER
----- Message from Clarisse Echeverria, Patient Care Assistant sent at 4/22/2022 12:35 PM CDT -----  Hi,     I was able to schedule patient the next available slot with Maren Kessler on May 12, 2002. Patient's daughter wanted a sooner appointment, but May 12 was the soonest.

## 2022-04-27 ENCOUNTER — PATIENT MESSAGE (OUTPATIENT)
Dept: SMOKING CESSATION | Facility: CLINIC | Age: 72
End: 2022-04-27
Payer: MEDICARE

## 2022-05-09 ENCOUNTER — PATIENT MESSAGE (OUTPATIENT)
Dept: SMOKING CESSATION | Facility: CLINIC | Age: 72
End: 2022-05-09
Payer: MEDICARE

## 2022-05-12 ENCOUNTER — OFFICE VISIT (OUTPATIENT)
Dept: GASTROENTEROLOGY | Facility: CLINIC | Age: 72
End: 2022-05-12
Payer: MEDICARE

## 2022-05-12 VITALS
HEART RATE: 69 BPM | HEIGHT: 73 IN | WEIGHT: 217.63 LBS | DIASTOLIC BLOOD PRESSURE: 82 MMHG | BODY MASS INDEX: 28.84 KG/M2 | SYSTOLIC BLOOD PRESSURE: 142 MMHG

## 2022-05-12 DIAGNOSIS — Z79.01 CURRENT USE OF LONG TERM ANTICOAGULATION: ICD-10-CM

## 2022-05-12 DIAGNOSIS — R19.5 POSITIVE COLORECTAL CANCER SCREENING USING COLOGUARD TEST: Primary | ICD-10-CM

## 2022-05-12 PROCEDURE — 1125F AMNT PAIN NOTED PAIN PRSNT: CPT | Mod: CPTII,S$GLB,, | Performed by: NURSE PRACTITIONER

## 2022-05-12 PROCEDURE — 1160F RVW MEDS BY RX/DR IN RCRD: CPT | Mod: CPTII,S$GLB,, | Performed by: NURSE PRACTITIONER

## 2022-05-12 PROCEDURE — 1160F PR REVIEW ALL MEDS BY PRESCRIBER/CLIN PHARMACIST DOCUMENTED: ICD-10-PCS | Mod: CPTII,S$GLB,, | Performed by: NURSE PRACTITIONER

## 2022-05-12 PROCEDURE — 3077F PR MOST RECENT SYSTOLIC BLOOD PRESSURE >= 140 MM HG: ICD-10-PCS | Mod: CPTII,S$GLB,, | Performed by: NURSE PRACTITIONER

## 2022-05-12 PROCEDURE — 1159F PR MEDICATION LIST DOCUMENTED IN MEDICAL RECORD: ICD-10-PCS | Mod: CPTII,S$GLB,, | Performed by: NURSE PRACTITIONER

## 2022-05-12 PROCEDURE — 3044F PR MOST RECENT HEMOGLOBIN A1C LEVEL <7.0%: ICD-10-PCS | Mod: CPTII,S$GLB,, | Performed by: NURSE PRACTITIONER

## 2022-05-12 PROCEDURE — 99999 PR PBB SHADOW E&M-EST. PATIENT-LVL IV: ICD-10-PCS | Mod: PBBFAC,,, | Performed by: NURSE PRACTITIONER

## 2022-05-12 PROCEDURE — 99999 PR PBB SHADOW E&M-EST. PATIENT-LVL IV: CPT | Mod: PBBFAC,,, | Performed by: NURSE PRACTITIONER

## 2022-05-12 PROCEDURE — 3044F HG A1C LEVEL LT 7.0%: CPT | Mod: CPTII,S$GLB,, | Performed by: NURSE PRACTITIONER

## 2022-05-12 PROCEDURE — 4010F ACE/ARB THERAPY RXD/TAKEN: CPT | Mod: CPTII,S$GLB,, | Performed by: NURSE PRACTITIONER

## 2022-05-12 PROCEDURE — 3288F PR FALLS RISK ASSESSMENT DOCUMENTED: ICD-10-PCS | Mod: CPTII,S$GLB,, | Performed by: NURSE PRACTITIONER

## 2022-05-12 PROCEDURE — 3008F PR BODY MASS INDEX (BMI) DOCUMENTED: ICD-10-PCS | Mod: CPTII,S$GLB,, | Performed by: NURSE PRACTITIONER

## 2022-05-12 PROCEDURE — 3079F DIAST BP 80-89 MM HG: CPT | Mod: CPTII,S$GLB,, | Performed by: NURSE PRACTITIONER

## 2022-05-12 PROCEDURE — 3008F BODY MASS INDEX DOCD: CPT | Mod: CPTII,S$GLB,, | Performed by: NURSE PRACTITIONER

## 2022-05-12 PROCEDURE — 1101F PT FALLS ASSESS-DOCD LE1/YR: CPT | Mod: CPTII,S$GLB,, | Performed by: NURSE PRACTITIONER

## 2022-05-12 PROCEDURE — 1101F PR PT FALLS ASSESS DOC 0-1 FALLS W/OUT INJ PAST YR: ICD-10-PCS | Mod: CPTII,S$GLB,, | Performed by: NURSE PRACTITIONER

## 2022-05-12 PROCEDURE — 3079F PR MOST RECENT DIASTOLIC BLOOD PRESSURE 80-89 MM HG: ICD-10-PCS | Mod: CPTII,S$GLB,, | Performed by: NURSE PRACTITIONER

## 2022-05-12 PROCEDURE — 4010F PR ACE/ARB THEARPY RXD/TAKEN: ICD-10-PCS | Mod: CPTII,S$GLB,, | Performed by: NURSE PRACTITIONER

## 2022-05-12 PROCEDURE — 99203 PR OFFICE/OUTPT VISIT, NEW, LEVL III, 30-44 MIN: ICD-10-PCS | Mod: S$GLB,,, | Performed by: NURSE PRACTITIONER

## 2022-05-12 PROCEDURE — 3077F SYST BP >= 140 MM HG: CPT | Mod: CPTII,S$GLB,, | Performed by: NURSE PRACTITIONER

## 2022-05-12 PROCEDURE — 1125F PR PAIN SEVERITY QUANTIFIED, PAIN PRESENT: ICD-10-PCS | Mod: CPTII,S$GLB,, | Performed by: NURSE PRACTITIONER

## 2022-05-12 PROCEDURE — 99203 OFFICE O/P NEW LOW 30 MIN: CPT | Mod: S$GLB,,, | Performed by: NURSE PRACTITIONER

## 2022-05-12 PROCEDURE — 1159F MED LIST DOCD IN RCRD: CPT | Mod: CPTII,S$GLB,, | Performed by: NURSE PRACTITIONER

## 2022-05-12 PROCEDURE — 3288F FALL RISK ASSESSMENT DOCD: CPT | Mod: CPTII,S$GLB,, | Performed by: NURSE PRACTITIONER

## 2022-05-12 RX ORDER — SODIUM, POTASSIUM,MAG SULFATES 17.5-3.13G
SOLUTION, RECONSTITUTED, ORAL ORAL
Qty: 354 ML | Refills: 0 | Status: ON HOLD | OUTPATIENT
Start: 2022-05-12 | End: 2022-05-18 | Stop reason: HOSPADM

## 2022-05-12 NOTE — PROGRESS NOTES
Clinic Consult:  [facility] Consultation Note    Reason for Consult:  The primary encounter diagnosis was Positive colorectal cancer screening using Cologuard test. A diagnosis of Current use of long term anticoagulation was also pertinent to this visit.    PCP: Primary Doctor No   [id number] / [place] [id number]    HPI:  This is a 72 y.o. male here for evaluation of positive cologuard.   He denies any overt GI bleeding.   He is on anticoagulation-- Eliquis which is managed by outside cardiologist, Dr. Corcoran.   No current GI complaints at this time. No abdominal pain, hematochezia, melena, nausea, vomiting, or weight loss.      Review of Systems   Constitutional: Negative for fever, malaise/fatigue and weight loss.   HENT: Negative for sore throat.    Respiratory: Negative for cough and wheezing.    Cardiovascular: Negative for chest pain and palpitations.   Gastrointestinal: Negative for abdominal pain, blood in stool, constipation, diarrhea, heartburn, melena, nausea and vomiting.   Genitourinary: Negative for dysuria and frequency.   Musculoskeletal: Negative for back pain, joint pain, myalgias and neck pain.   Skin: Negative for itching and rash.   Neurological: Negative for dizziness, speech change, seizures, loss of consciousness and headaches.   Psychiatric/Behavioral: Negative for depression and substance abuse. The patient is not nervous/anxious.        Medical History:  has a past medical history of Cataract and Glaucoma.    Surgical History:  has a past surgical history that includes Coronary angioplasty with stent; Ankle fracture surgery (Left); and Colonoscopy.    Family History: family history includes Brain Hemorrhage in his sister; Diabetes in his father; Heart disease in his brother, father, and mother; Ovarian cancer in his mother..     Social History:  reports that he has been smoking cigarettes. He started smoking about 52 years ago. He has a 40.50 pack-year smoking  "history. He has never used smokeless tobacco. He reports current alcohol use. He reports that he does not use drugs.    Allergies: Reviewed    Home Medications:   Current Outpatient Medications on File Prior to Visit   Medication Sig Dispense Refill    albuterol (PROVENTIL/VENTOLIN HFA) 90 mcg/actuation inhaler Inhale 2 puffs into the lungs every 4 (four) hours as needed for Wheezing or Shortness of Breath. 18 g 11    apixaban (ELIQUIS) 5 mg Tab Take 1 tablet (5 mg total) by mouth 2 (two) times daily. 180 tablet 3    aspirin (ECOTRIN) 81 MG EC tablet Take 81 mg by mouth.      atorvastatin (LIPITOR) 80 MG tablet Take 1 tablet (80 mg total) by mouth once daily. 90 tablet 1    brimonidine 0.2% (ALPHAGAN) 0.2 % Drop Place 1 drop into both eyes every 12 (twelve) hours. 10 mL 12    carvediloL (COREG) 25 MG tablet Take 1 tablet (25 mg total) by mouth 2 (two) times daily with meals. 180 tablet 1    furosemide (LASIX) 40 MG tablet Take 1 tablet (40 mg total) by mouth daily as needed (swelling). 90 tablet 0    lisinopriL (PRINIVIL,ZESTRIL) 40 MG tablet Take 1 tablet (40 mg total) by mouth once daily. 90 tablet 1    netarsudiL-latanoprost (ROCKLATAN) 0.02-0.005 % Drop Place 1 drop into both eyes every evening. 2.5 mL 12    nitroGLYCERIN (NITROSTAT) 0.4 MG SL tablet PLACE 1 TABLET UNDER THE TONGUE EVERY 5 (FIVE) MINUTES AS NEEDED FOR CHEST PAIN.MAX 3 DOSES IN15 MIN 20 tablet 0    spironolactone (ALDACTONE) 100 MG tablet Take 1 tablet (100 mg total) by mouth once daily. 90 tablet 1    traZODone (DESYREL) 50 MG tablet Take 1 tablet (50 mg total) by mouth every evening. 90 tablet 3     No current facility-administered medications on file prior to visit.       Physical Exam:  BP (!) 142/82 (BP Location: Left arm, Patient Position: Sitting, BP Method: Medium (Manual))   Pulse 69   Ht 6' 1" (1.854 m)   Wt 98.7 kg (217 lb 9.5 oz)   BMI 28.71 kg/m²   Body mass index is 28.71 kg/m².  Physical Exam  Constitutional:       " General: He is not in acute distress.  HENT:      Head: Normocephalic and atraumatic.   Eyes:      General: No scleral icterus.     Conjunctiva/sclera: Conjunctivae normal.   Cardiovascular:      Rate and Rhythm: Normal rate and regular rhythm.      Heart sounds: No murmur heard.  Pulmonary:      Effort: Pulmonary effort is normal. No respiratory distress.      Breath sounds: Normal breath sounds. No wheezing.   Abdominal:      General: Abdomen is flat. Bowel sounds are normal.      Palpations: Abdomen is soft.      Tenderness: There is no abdominal tenderness.   Skin:     General: Skin is warm and dry.   Neurological:      General: No focal deficit present.      Mental Status: He is alert and oriented to person, place, and time.      Cranial Nerves: No cranial nerve deficit.   Psychiatric:         Mood and Affect: Mood normal.         Judgment: Judgment normal.         Labs: Pertinent labs reviewed.    Assessment:  1. Positive colorectal cancer screening using Cologuard test    2. Current use of long term anticoagulation        Recommendations:   - colonoscopy  - send approval to hold blood thinner to cardiologist      Positive colorectal cancer screening using Cologuard test  -     Ambulatory referral/consult to Gastroenterology  -     Case Request Endoscopy: COLONOSCOPY  -     SUPREP BOWEL PREP KIT 17.5-3.13-1.6 gram SolR; Use as directed  Dispense: 354 mL; Refill: 0    Current use of long term anticoagulation    Follow up to be determined after results/ procedure(s).    Thank you so much for allowing me to participate in the care of REBECCA Hough

## 2022-05-12 NOTE — PROGRESS NOTES
Location Screening:    If answers yes to either of the following, schedule at [facility] ONLY. If No, OK for either location.    1. Is procedure for esophageal banding (Var[facility])? no Yes, select [facility]  2. Is this an Advanced Endoscopic procedure (Dr West)?  no Yes, select [facility] (except for bariatric procedures - schedule those at the [facility])  3. Is the BMI > 50? no Yes, select [facility]  4. Does the patient have chronic hypoxic respiratory failure, as evidenced by symptoms below? no Yes, select [facility]  a. O2 Saturation <92%  b. Is the patient on supplemental O2  c. History of moderate to severe PFT's  d. Unable to complete a 6 min walk test    COVID Screening    1. Are you COVID vaccinated? yes    2. Are you experiencing shortness of breath, cough, muscle aches, loss of taste or loss of smell?  no    If answered yes to #2 then the patient must seek medical attention with their PCP, urgent care or ED.  Do not schedule the procedure.       ENDO screening    1. Have you been admitted for cardiac, kidney or pulmonary causes to the hospital in the past 3 months? no   If yes, schedule an appointment with PCP before scheduling endoscopic procedure  Unless patient has been seen by Nephrology, Cardiologist or in the GI department within 3 months.      2. Have you had a stent placed in the last 12 months? no: If yes, have one of our providers review the chart and determine if they need to be seen in clinic.      3. Have you had a stroke or heart attack in the past 6 months? no If patient has not been seen by PCP, Neurology or Cardiology within 3 months, have one of our providers review the chart and determine if they need to be seen in clinic.        4. Have you had any chest pain in the past 3 months? yes   If yes, have you been evaluated by your PCP and/or cardiologist and was it determined to not be heart related? yes   If No, Pt needs to be seen by PCP or Cardiologist.  Pt can be scheduled once cardiac clearance obtained by  "either of those providers.     5. Do you take prescription weight loss medications?  no   If yes, must stop weight loss medication for 2 weeks prior to procedure.     6. Have you been diagnosed with diverticulitis within the past 3 months? no   If yes, must have been seen by GI within the last 3 months, if not schedule with GI [facility].    If Pt has been seen by GI, schedule procedure 8-12 weeks post antibiotic treatment.     7. Are you on Dialysis? no  If yes, schedule procedure for the day AFTER dialysis.  Appt time should be 9am or later, patient arrival time is 2 hours prior to procedure, for labs.  Nulytely or miralax prep must be used for all patients with kidney disease.     8. Are you diabetic?  no   If yes, schedule morning appt. Advise Pt to hold all diabetic meds day of procedure.     9. All patients 80 years of age and older must be seen in the GI clinic - please schedule an [facility] appointment - Do not schedule a scope procedure appointment.     10. Is patient on a "high risk" medication (blood thinner/antiplatelet agent)?  yes   If yes, has cardiac clearance been obtained within the last 60 days? No   If no, a new cardiac clearance must be obtained.     Final Questions:    1.I have reviewed the last colonoscopy for recommendations regarding next procedure bowel prep.  yes  2. I have reviewed medications and allergies.  yes  3. I have verified the pharmacy information and appropriate prep sent if needed. yes  4. Prep instructions have been mailed or sent to portal per patient request. yes        All schedulers will have ability to reach out to the ordering GI provider to clarify any issues.           "

## 2022-05-17 DIAGNOSIS — R19.5 POSITIVE COLORECTAL CANCER SCREENING USING COLOGUARD TEST: Primary | ICD-10-CM

## 2022-05-17 RX ORDER — SYRING-NEEDL,DISP,INSUL,0.3 ML 29 G X1/2"
296 SYRINGE, EMPTY DISPOSABLE MISCELLANEOUS ONCE
Qty: 1 EACH | Refills: 0 | Status: ON HOLD | OUTPATIENT
Start: 2022-05-17 | End: 2022-05-18 | Stop reason: HOSPADM

## 2022-05-17 RX ORDER — POLYETHYLENE GLYCOL 3350, SODIUM SULFATE ANHYDROUS, SODIUM BICARBONATE, SODIUM CHLORIDE, POTASSIUM CHLORIDE 236; 22.74; 6.74; 5.86; 2.97 G/4L; G/4L; G/4L; G/4L; G/4L
4 POWDER, FOR SOLUTION ORAL ONCE
Qty: 4000 ML | Refills: 0 | Status: ON HOLD | OUTPATIENT
Start: 2022-05-17 | End: 2022-05-18 | Stop reason: HOSPADM

## 2022-05-18 ENCOUNTER — HOSPITAL ENCOUNTER (OUTPATIENT)
Facility: HOSPITAL | Age: 72
Discharge: HOME | End: 2022-05-18
Attending: INTERNAL MEDICINE | Admitting: INTERNAL MEDICINE
Payer: MEDICARE

## 2022-05-18 ENCOUNTER — ANESTHESIA (OUTPATIENT)
Dept: ENDOSCOPY | Facility: HOSPITAL | Age: 72
End: 2022-05-18
Payer: MEDICARE

## 2022-05-18 ENCOUNTER — ANESTHESIA EVENT (OUTPATIENT)
Dept: ENDOSCOPY | Facility: HOSPITAL | Age: 72
End: 2022-05-18
Payer: MEDICARE

## 2022-05-18 DIAGNOSIS — R19.5 POSITIVE COLORECTAL CANCER SCREENING USING COLOGUARD TEST: Primary | ICD-10-CM

## 2022-05-18 PROCEDURE — 45380 COLONOSCOPY AND BIOPSY: CPT | Mod: 59,,, | Performed by: INTERNAL MEDICINE

## 2022-05-18 PROCEDURE — 25000003 PHARM REV CODE 250: Performed by: INTERNAL MEDICINE

## 2022-05-18 PROCEDURE — 88305 TISSUE EXAM BY PATHOLOGIST: CPT | Mod: 26,,, | Performed by: PATHOLOGY

## 2022-05-18 PROCEDURE — 63600175 PHARM REV CODE 636 W HCPCS: Performed by: STUDENT IN AN ORGANIZED HEALTH CARE EDUCATION/TRAINING PROGRAM

## 2022-05-18 PROCEDURE — 45380 PR COLONOSCOPY,BIOPSY: ICD-10-PCS | Mod: 59,,, | Performed by: INTERNAL MEDICINE

## 2022-05-18 PROCEDURE — 25000003 PHARM REV CODE 250: Performed by: STUDENT IN AN ORGANIZED HEALTH CARE EDUCATION/TRAINING PROGRAM

## 2022-05-18 PROCEDURE — 27201089 HC SNARE, DISP (ANY): Performed by: INTERNAL MEDICINE

## 2022-05-18 PROCEDURE — 37000009 HC ANESTHESIA EA ADD 15 MINS: Performed by: INTERNAL MEDICINE

## 2022-05-18 PROCEDURE — 27201012 HC FORCEPS, HOT/COLD, DISP: Performed by: INTERNAL MEDICINE

## 2022-05-18 PROCEDURE — 88305 TISSUE EXAM BY PATHOLOGIST: ICD-10-PCS | Mod: 26,,, | Performed by: PATHOLOGY

## 2022-05-18 PROCEDURE — 88305 TISSUE EXAM BY PATHOLOGIST: CPT | Performed by: PATHOLOGY

## 2022-05-18 PROCEDURE — 45380 COLONOSCOPY AND BIOPSY: CPT | Performed by: INTERNAL MEDICINE

## 2022-05-18 PROCEDURE — 45385 PR COLONOSCOPY,REMV LESN,SNARE: ICD-10-PCS | Mod: ,,, | Performed by: INTERNAL MEDICINE

## 2022-05-18 PROCEDURE — 45385 COLONOSCOPY W/LESION REMOVAL: CPT | Mod: ,,, | Performed by: INTERNAL MEDICINE

## 2022-05-18 PROCEDURE — 37000008 HC ANESTHESIA 1ST 15 MINUTES: Performed by: INTERNAL MEDICINE

## 2022-05-18 PROCEDURE — 45385 COLONOSCOPY W/LESION REMOVAL: CPT | Performed by: INTERNAL MEDICINE

## 2022-05-18 RX ORDER — LIDOCAINE HYDROCHLORIDE 10 MG/ML
INJECTION, SOLUTION EPIDURAL; INFILTRATION; INTRACAUDAL; PERINEURAL
Status: DISCONTINUED | OUTPATIENT
Start: 2022-05-18 | End: 2022-05-18

## 2022-05-18 RX ORDER — SODIUM CHLORIDE, SODIUM LACTATE, POTASSIUM CHLORIDE, CALCIUM CHLORIDE 600; 310; 30; 20 MG/100ML; MG/100ML; MG/100ML; MG/100ML
INJECTION, SOLUTION INTRAVENOUS CONTINUOUS PRN
Status: DISCONTINUED | OUTPATIENT
Start: 2022-05-18 | End: 2022-05-18

## 2022-05-18 RX ORDER — DEXTROMETHORPHAN/PSEUDOEPHED 2.5-7.5/.8
DROPS ORAL
Status: COMPLETED | OUTPATIENT
Start: 2022-05-18 | End: 2022-05-18

## 2022-05-18 RX ORDER — PROPOFOL 10 MG/ML
VIAL (ML) INTRAVENOUS
Status: DISCONTINUED | OUTPATIENT
Start: 2022-05-18 | End: 2022-05-18

## 2022-05-18 RX ADMIN — SIMETHICONE 40 MG: 20 SUSPENSION/ DROPS ORAL at 08:05

## 2022-05-18 RX ADMIN — LIDOCAINE HYDROCHLORIDE 50 MG: 10 INJECTION, SOLUTION EPIDURAL; INFILTRATION; INTRACAUDAL; PERINEURAL at 08:05

## 2022-05-18 RX ADMIN — PROPOFOL 50 MG: 10 INJECTION, EMULSION INTRAVENOUS at 08:05

## 2022-05-18 RX ADMIN — PROPOFOL 100 MG: 10 INJECTION, EMULSION INTRAVENOUS at 08:05

## 2022-05-18 RX ADMIN — SODIUM CHLORIDE, SODIUM LACTATE, POTASSIUM CHLORIDE, AND CALCIUM CHLORIDE: 600; 310; 30; 20 INJECTION, SOLUTION INTRAVENOUS at 08:05

## 2022-05-18 NOTE — PLAN OF CARE
VSS. No GI bleeding noted. Discharge instructions provided to pt/family w/understanding verbalized.  Dr Blake at bedside to discuss test results w/pt and family

## 2022-05-18 NOTE — ANESTHESIA PREPROCEDURE EVALUATION
05/18/2022  Raghavendra Luz is a 72 y.o., male.      Pre-op Assessment    I have reviewed the Patient Summary Reports.     I have reviewed the Nursing Notes. I have reviewed the NPO Status.   I have reviewed the Medications.     Review of Systems  Cardiovascular:   Hypertension CAD    Congestive Heart Failure (CHF) , Chronic Congestive Heart Failure , NYHA Classification II  , LV Systolic HF Cardiomyopathy  Hypertension        Physical Exam  General: Well nourished, Cooperative and Alert    Airway:  Mallampati: II   Mouth Opening: Normal  Neck ROM: Normal ROM        Anesthesia Plan  Type of Anesthesia, risks & benefits discussed:    Anesthesia Type: MAC  Intra-op Monitoring Plan: Standard ASA Monitors  Post Op Pain Control Plan: multimodal analgesia  Induction:  IV  Informed Consent: Informed consent signed with the Patient and all parties understand the risks and agree with anesthesia plan.  All questions answered. Patient consented to blood products? Yes  ASA Score: 3  Day of Surgery Review of History & Physical: H&P Update referred to the surgeon/provider.    Ready For Surgery From Anesthesia Perspective.     .

## 2022-05-18 NOTE — TRANSFER OF CARE
"Anesthesia Transfer of Care Note    Patient: Raghavendra Luz    Procedure(s) Performed: Procedure(s) (LRB):  COLONOSCOPY (N/A)    Patient location: PACU    Anesthesia Type: MAC    Transport from OR: Transported from OR on room air with adequate spontaneous ventilation    Post pain: adequate analgesia    Post assessment: no apparent anesthetic complications    Post vital signs: stable    Level of consciousness: responds to stimulation and awake    Nausea/Vomiting: no nausea/vomiting    Complications: none    Transfer of care protocol was followed      Last vitals:   Visit Vitals  BP (!) 153/96 (BP Location: Left arm, Patient Position: Sitting)   Pulse 85   Temp 36.6 °C (97.9 °F) (Temporal)   Resp 18   Ht 6' 3" (1.905 m)   Wt 101.2 kg (223 lb)   SpO2 95%   BMI 27.87 kg/m²     "

## 2022-05-18 NOTE — ANESTHESIA POSTPROCEDURE EVALUATION
Anesthesia Post Evaluation    Patient: Raghavendra Luz    Procedure(s) Performed: Procedure(s) (LRB):  COLONOSCOPY (N/A)    Final Anesthesia Type: MAC      Patient location during evaluation: GI PACU  Patient participation: Yes- Able to Participate  Level of consciousness: awake and alert and oriented  Post-procedure vital signs: reviewed and stable  Pain management: adequate  Airway patency: patent  ALMA mitigation strategies: Multimodal analgesia  PONV status at discharge: No PONV  Anesthetic complications: no      Cardiovascular status: blood pressure returned to baseline  Respiratory status: unassisted  Hydration status: euvolemic  Follow-up not needed.          Vitals Value Taken Time   /96 05/18/22 0757   Temp 36.6 °C (97.9 °F) 05/18/22 0757   Pulse 85 05/18/22 0757   Resp 18 05/18/22 0757   SpO2 95 % 05/18/22 0757         No case tracking events are documented in the log.      Pain/Anna Score: No data recorded

## 2022-05-18 NOTE — H&P
PRE PROCEDURE H&P    Patient Name: Raghavendra Luz  MRN: [id number]  : 1950  Date of Procedure:  2022  Referring Physician: Madalyn Crowley MD  Primary Physician: PORSHA Dobson Jr, MD  Procedure Physician: Sandra Blake MD       Planned Procedure: Colonoscopy  Diagnosis: positive cologuard   Chief Complaint: Same as above    HPI: Patient is an 72 y.o. male is here for the above.     Last colonoscopy: Unsure   Family history: None  Anticoagulation: 3 days     Past Medical History:   Past Medical History:   Diagnosis Date    Cataract     Glaucoma         Past Surgical History:  Past Surgical History:   Procedure Laterality Date    ANKLE FRACTURE SURGERY Left     COLONOSCOPY      CORONARY ANGIOPLASTY WITH STENT PLACEMENT          Home Medications:  Prior to Admission medications    Medication Sig Start Date End Date Taking? Authorizing Provider   albuterol (PROVENTIL/VENTOLIN HFA) 90 mcg/actuation inhaler Inhale 2 puffs into the lungs every 4 (four) hours as needed for Wheezing or Shortness of Breath. 22  Yes Alexi Regalado MD   aspirin (ECOTRIN) 81 MG EC tablet Take 81 mg by mouth. 20  Yes Historical Provider   atorvastatin (LIPITOR) 80 MG tablet Take 1 tablet (80 mg total) by mouth once daily. 3/8/22  Yes Dyan Still NP   brimonidine 0.2% (ALPHAGAN) 0.2 % Drop Place 1 drop into both eyes every 12 (twelve) hours. 3/3/22 3/3/23 Yes Gelacio Temple MD   carvediloL (COREG) 25 MG tablet Take 1 tablet (25 mg total) by mouth 2 (two) times daily with meals. 3/8/22 6/6/22 Yes Dyan Still NP   furosemide (LASIX) 40 MG tablet Take 1 tablet (40 mg total) by mouth daily as needed (swelling). 3/8/22  Yes Dyan Still NP   lisinopriL (PRINIVIL,ZESTRIL) 40 MG tablet Take 1 tablet (40 mg total) by mouth once daily. 3/8/22  Yes Dyan Still NP   netarsudiL-latanoprost (ROCKLATAN) 0.02-0.005 % Drop Place 1 drop into both eyes every evening. 3/3/22  Yes  Gelacio Temple MD   spironolactone (ALDACTONE) 100 MG tablet Take 1 tablet (100 mg total) by mouth once daily. 3/8/22 3/8/23 Yes Dyan Still NP   SUPREP BOWEL PREP KIT 17.5-3.13-1.6 gram SolR Use as directed 5/12/22  Yes Maren Kessler NP   traZODone (DESYREL) 50 MG tablet Take 1 tablet (50 mg total) by mouth every evening. 3/24/22 3/24/23 Yes ALISHA Dobson Jr., MD   apixaban (ELIQUIS) 5 mg Tab Take 1 tablet (5 mg total) by mouth 2 (two) times daily. 4/20/22 4/20/23  Alexi Regalado MD   magnesium citrate solution Take 296 mLs by mouth once. for 1 dose 5/17/22 5/17/22  Madalyn Crowley MD   nitroGLYCERIN (NITROSTAT) 0.4 MG SL tablet PLACE 1 TABLET UNDER THE TONGUE EVERY 5 (FIVE) MINUTES AS NEEDED FOR CHEST PAIN.MAX 3 DOSES IN15 MIN 3/8/22   Dyan Still NP   polyethylene glycol (GOLYTELY,NULYTELY) 236-22.74-6.74 -5.86 gram suspension Take 4,000 mLs (4 L total) by mouth once. for 1 dose 5/17/22 5/17/22  Madalyn Crowley MD        Allergies:  Review of patient's allergies indicates:   Allergen Reactions    Iodine Anaphylaxis     Shellfish allergy - pt confirms can take Iodine products.    Shellfish containing products Anaphylaxis    Olmesartan Hives        Social History:   Social History     Socioeconomic History    Marital status: Single   Tobacco Use    Smoking status: Current Every Day Smoker     Packs/day: 0.75     Years: 54.00     Pack years: 40.50     Types: Cigarettes     Start date: 1/1/1970    Smokeless tobacco: Never Used   Substance and Sexual Activity    Alcohol use: Yes    Drug use: Never    Sexual activity: Not Currently   Social History Narrative    Lives alone, no HH as of 03/2022. No other smokers or pets in household.       Family History:  Family History   Problem Relation Age of Onset    Heart disease Mother     Ovarian cancer Mother     Diabetes Father     Heart disease Father     Brain Hemorrhage Sister     Heart disease Brother        ROS: No  "acute cardiac events, no acute respiratory complaints.     Physical Exam (all patients):    BP (!) 153/96 (BP Location: Left arm, Patient Position: Sitting)   Pulse 85   Temp 97.9 °F (36.6 °C) (Temporal)   Resp 18   Ht 6' 3" (1.905 m)   Wt 101.2 kg (223 lb)   SpO2 95%   BMI 27.87 kg/m²   Lungs: Clear to auscultation bilaterally, respirations unlabored  Heart: Regular rate and rhythm, S1 and S2 normal, no obvious murmurs  Abdomen:         Soft, non-tender, bowel sounds normal, no masses, no organomegaly    Lab Results   Component Value Date    WBC 4.00 03/08/2022    MCV 76 (L) 03/08/2022    RDW 19.1 (H) 03/08/2022     03/08/2022    GLU 90 03/08/2022    HGBA1C 5.7 (H) 03/08/2022    BUN 7 (L) 03/08/2022     (L) 03/08/2022    K 4.3 03/08/2022    CL 99 03/08/2022        SEDATION PLAN: per anesthesia      History reviewed, vital signs satisfactory, cardiopulmonary status satisfactory, sedation options, risks and plans have been discussed with the patient  All their questions were answered and the patient agrees to the sedation procedures as planned and the patient is deemed an appropriate candidate for the sedation as planned.    Procedure explained to patient, informed consent obtained and placed in chart.    Sandra Blake  5/18/2022  8:08 AM     "

## 2022-05-18 NOTE — PROVATION PATIENT INSTRUCTIONS
Discharge Summary/Instructions after an Endoscopic Procedure  Patient Name: Raghavendra Luz  Patient MRN: [id number]  Patient YOB: 1950  Wednesday, May 18, 2022 Sandra Blake MD  Dear patient,  As a result of recent federal legislation (The Federal Cures Act), you may   receive lab or pathology results from your procedure in your [facility]   account before your physician is able to contact you. Your physician or   their representative will relay the results to you with their   recommendations at their soonest availability.  Thank you,  RESTRICTIONS:  During your procedure today, you received medications for sedation.  These   medications may affect your judgment, balance and coordination.  Therefore,   for 24 hours, you have the following restrictions:   - DO NOT drive a car, operate machinery, make legal/financial decisions,   sign important papers or drink alcohol.    ACTIVITY:  Today: no heavy lifting, straining or running due to procedural   sedation/anesthesia.  The following day: return to full activity including work.  DIET:  Eat and drink normally unless instructed otherwise.     TREATMENT FOR COMMON SIDE EFFECTS:  - Mild abdominal pain, nausea, belching, bloating or excessive gas:  rest,   eat lightly and use a heating pad.  - Sore Throat: treat with throat lozenges and/or gargle with warm salt   water.  - Because air was used during the procedure, expelling large amounts of air   from your rectum or belching is normal.  - If a bowel prep was taken, you may not have a bowel movement for 1-3 days.    This is normal.  SYMPTOMS TO WATCH FOR AND REPORT TO YOUR PHYSICIAN:  1. Abdominal pain or bloating, other than gas cramps.  2. Chest pain.  3. Back pain.  4. Signs of infection such as: chills or fever occurring within 24 hours   after the procedure.  5. Rectal bleeding, which would show as bright red, maroon, or black stools.   (A tablespoon of blood from the rectum is not serious, especially  if   hemorrhoids are present.)  6. Vomiting.  7. Weakness or dizziness.  GO DIRECTLY TO THE NEAREST EMERGENCY ROOM IF YOU HAVE ANY OF THE FOLLOWING:      Difficulty breathing              Chills and/or fever over 101 F   Persistent vomiting and/or vomiting blood   Severe abdominal pain   Severe chest pain   Black, tarry stools   Bleeding- more than one tablespoon   Any other symptom or condition that you feel may need urgent attention  Your doctor recommends these additional instructions:  If any biopsies were taken, your doctors clinic will contact you in 1 to 2   weeks with any results.  - Discharge patient to home.   - Resume previous diet.   -Avoid NSAIDs.   - Continue present medications.   - Await pathology results.   - Repeat colonoscopy in 1 year for surveillance.   - Return to referring physician.   - Patient has a contact number available for emergencies.  The signs and   symptoms of potential delayed complications were discussed with the   patient.  Return to normal activities tomorrow.  Written discharge   instructions were provided to the patient.  For questions, problems or results please call your physician Sandra Blake MD at Work:  [phone]  If you have any questions about the above instructions, call the GI   department at [phone] or call the endoscopy unit at [phone]   from 7am until 3 pm.  [facility] [place], EMERGENCY ROOM PHONE NUMBER:   [phone]  IF A COMPLICATION OR EMERGENCY SITUATION ARISES AND YOU ARE UNABLE TO REACH   YOUR PHYSICIAN - GO DIRECTLY TO THE EMERGENCY ROOM.  I have read or have had read to me these discharge instructions for my   procedure and have received a written copy.  I understand these   instructions and will follow-up with my physician if I have any questions.     __________________________________       _____________________________________  Nurse Signature                                          Patient/Designated    Responsible Party Signature  Sandra Blake MD  5/18/2022 8:45:25 AM  This report has been verified and signed electronically.  Dear patient,  As a result of recent federal legislation (The Federal Cures Act), you may   receive lab or pathology results from your procedure in your [facility]   account before your physician is able to contact you. Your physician or   their representative will relay the results to you with their   recommendations at their soonest availability.  Thank you,  PROVATION

## 2022-05-19 VITALS
OXYGEN SATURATION: 95 % | HEIGHT: 75 IN | HEART RATE: 74 BPM | DIASTOLIC BLOOD PRESSURE: 86 MMHG | WEIGHT: 223 LBS | SYSTOLIC BLOOD PRESSURE: 127 MMHG | BODY MASS INDEX: 27.73 KG/M2 | RESPIRATION RATE: 18 BRPM | TEMPERATURE: 98 F

## 2022-05-25 LAB
COMMENT: NORMAL
FINAL PATHOLOGIC DIAGNOSIS: NORMAL
GROSS: NORMAL
Lab: NORMAL

## 2022-06-15 ENCOUNTER — PATIENT MESSAGE (OUTPATIENT)
Dept: GASTROENTEROLOGY | Facility: CLINIC | Age: 72
End: 2022-06-15
Payer: MEDICARE

## 2022-06-16 ENCOUNTER — TELEPHONE (OUTPATIENT)
Dept: GASTROENTEROLOGY | Facility: CLINIC | Age: 72
End: 2022-06-16
Payer: MEDICARE

## 2022-06-16 NOTE — TELEPHONE ENCOUNTER
----- Message from Maren Brower sent at 6/15/2022  7:10 PM CDT -----  Regarding: Test Results  Contact: Patient  Type:  Test Results    Who Called: Patient   Name of Test (Lab/Mammo/Etc):  Colonoscopy   Date of Test: 05/18/2022   Ordering Provider: Olya Completed by: Hayden   Where the test was performed:  [facility]   Would the patient rather a call back or a response via [facility]? Call back   Best Call Back Number:  [phone]  Additional Information:  Patient stated he would like a call back to discuss results Please Assist

## 2022-06-16 NOTE — TELEPHONE ENCOUNTER
Spoke with pt who is concerned about his pathology results. He has a GI appt on 6/24/22 to discuss. He reported that his bowel habits have changed. Since the procedure he is no longer able to have a bowel movement every day. He states he has a partial bowel movement every 2 days.

## 2022-06-16 NOTE — TELEPHONE ENCOUNTER
----- Message from Marisol Ramsey sent at 6/16/2022 10:45 AM CDT -----  Contact: patient/[phone]  Patient received a call from you and was unable to get to the phone.       Thanks KL

## 2022-06-22 ENCOUNTER — TELEPHONE (OUTPATIENT)
Dept: PULMONOLOGY | Facility: CLINIC | Age: 72
End: 2022-06-22
Payer: MEDICARE

## 2022-06-24 ENCOUNTER — HOSPITAL ENCOUNTER (EMERGENCY)
Facility: HOSPITAL | Age: 72
Discharge: HOME | End: 2022-06-24
Attending: EMERGENCY MEDICINE
Payer: MEDICARE

## 2022-06-24 ENCOUNTER — OFFICE VISIT (OUTPATIENT)
Dept: GASTROENTEROLOGY | Facility: CLINIC | Age: 72
End: 2022-06-24
Payer: MEDICARE

## 2022-06-24 VITALS
HEIGHT: 75 IN | RESPIRATION RATE: 22 BRPM | DIASTOLIC BLOOD PRESSURE: 75 MMHG | HEART RATE: 71 BPM | SYSTOLIC BLOOD PRESSURE: 119 MMHG | OXYGEN SATURATION: 99 % | BODY MASS INDEX: 26.51 KG/M2 | WEIGHT: 213.19 LBS | TEMPERATURE: 98 F

## 2022-06-24 VITALS
SYSTOLIC BLOOD PRESSURE: 66 MMHG | WEIGHT: 213.31 LBS | HEIGHT: 75 IN | HEART RATE: 67 BPM | DIASTOLIC BLOOD PRESSURE: 54 MMHG | BODY MASS INDEX: 26.52 KG/M2

## 2022-06-24 DIAGNOSIS — R53.1 WEAKNESS: ICD-10-CM

## 2022-06-24 DIAGNOSIS — I95.9 HYPOTENSION, UNSPECIFIED HYPOTENSION TYPE: Primary | ICD-10-CM

## 2022-06-24 LAB
ALBUMIN SERPL BCP-MCNC: 3.9 G/DL (ref 3.5–5.2)
ALP SERPL-CCNC: 45 U/L (ref 55–135)
ALT SERPL W/O P-5'-P-CCNC: 30 U/L (ref 10–44)
ANION GAP SERPL CALC-SCNC: 11 MMOL/L (ref 8–16)
AST SERPL-CCNC: 17 U/L (ref 10–40)
BASOPHILS # BLD AUTO: 0.05 K/UL (ref 0–0.2)
BASOPHILS NFR BLD: 0.9 % (ref 0–1.9)
BILIRUB SERPL-MCNC: 1.1 MG/DL (ref 0.1–1)
BILIRUB UR QL STRIP: NEGATIVE
BNP SERPL-MCNC: 45 PG/ML (ref 0–99)
BUN SERPL-MCNC: 12 MG/DL (ref 8–23)
CALCIUM SERPL-MCNC: 9.3 MG/DL (ref 8.7–10.5)
CHLORIDE SERPL-SCNC: 99 MMOL/L (ref 95–110)
CK SERPL-CCNC: 44 U/L (ref 20–200)
CLARITY UR: CLEAR
CO2 SERPL-SCNC: 20 MMOL/L (ref 23–29)
COLOR UR: YELLOW
CREAT SERPL-MCNC: 1.2 MG/DL (ref 0.5–1.4)
DIFFERENTIAL METHOD: ABNORMAL
EOSINOPHIL # BLD AUTO: 0.2 K/UL (ref 0–0.5)
EOSINOPHIL NFR BLD: 4.1 % (ref 0–8)
ERYTHROCYTE [DISTWIDTH] IN BLOOD BY AUTOMATED COUNT: 16.9 % (ref 11.5–14.5)
EST. GFR  (AFRICAN AMERICAN): >60 ML/MIN/1.73 M^2
EST. GFR  (NON AFRICAN AMERICAN): >60 ML/MIN/1.73 M^2
GLUCOSE SERPL-MCNC: 92 MG/DL (ref 70–110)
GLUCOSE UR QL STRIP: NEGATIVE
HCT VFR BLD AUTO: 42 % (ref 40–54)
HGB BLD-MCNC: 13.3 G/DL (ref 14–18)
HGB UR QL STRIP: NEGATIVE
IMM GRANULOCYTES # BLD AUTO: 0.02 K/UL (ref 0–0.04)
IMM GRANULOCYTES NFR BLD AUTO: 0.4 % (ref 0–0.5)
KETONES UR QL STRIP: NEGATIVE
LEUKOCYTE ESTERASE UR QL STRIP: NEGATIVE
LYMPHOCYTES # BLD AUTO: 1.5 K/UL (ref 1–4.8)
LYMPHOCYTES NFR BLD: 28.8 % (ref 18–48)
MCH RBC QN AUTO: 23.3 PG (ref 27–31)
MCHC RBC AUTO-ENTMCNC: 31.7 G/DL (ref 32–36)
MCV RBC AUTO: 73 FL (ref 82–98)
MONOCYTES # BLD AUTO: 0.7 K/UL (ref 0.3–1)
MONOCYTES NFR BLD: 12.4 % (ref 4–15)
NEUTROPHILS # BLD AUTO: 2.8 K/UL (ref 1.8–7.7)
NEUTROPHILS NFR BLD: 53.4 % (ref 38–73)
NITRITE UR QL STRIP: NEGATIVE
NRBC BLD-RTO: 0 /100 WBC
PH UR STRIP: 6 [PH] (ref 5–8)
PLATELET # BLD AUTO: 205 K/UL (ref 150–450)
PMV BLD AUTO: 9.9 FL (ref 9.2–12.9)
POTASSIUM SERPL-SCNC: 5.1 MMOL/L (ref 3.5–5.1)
PROT SERPL-MCNC: 7.1 G/DL (ref 6–8.4)
PROT UR QL STRIP: NEGATIVE
RBC # BLD AUTO: 5.72 M/UL (ref 4.6–6.2)
SODIUM SERPL-SCNC: 130 MMOL/L (ref 136–145)
SP GR UR STRIP: 1.01 (ref 1–1.03)
TROPONIN I SERPL DL<=0.01 NG/ML-MCNC: <0.006 NG/ML (ref 0–0.03)
URN SPEC COLLECT METH UR: NORMAL
UROBILINOGEN UR STRIP-ACNC: NEGATIVE EU/DL
WBC # BLD AUTO: 5.32 K/UL (ref 3.9–12.7)

## 2022-06-24 PROCEDURE — 99999 PR PBB SHADOW E&M-EST. PATIENT-LVL III: CPT | Mod: PBBFAC,,, | Performed by: INTERNAL MEDICINE

## 2022-06-24 PROCEDURE — 93005 ELECTROCARDIOGRAM TRACING: CPT

## 2022-06-24 PROCEDURE — 3078F PR MOST RECENT DIASTOLIC BLOOD PRESSURE < 80 MM HG: ICD-10-PCS | Mod: CPTII,S$GLB,, | Performed by: INTERNAL MEDICINE

## 2022-06-24 PROCEDURE — 4010F PR ACE/ARB THEARPY RXD/TAKEN: ICD-10-PCS | Mod: CPTII,S$GLB,, | Performed by: INTERNAL MEDICINE

## 2022-06-24 PROCEDURE — 80053 COMPREHEN METABOLIC PANEL: CPT | Performed by: EMERGENCY MEDICINE

## 2022-06-24 PROCEDURE — 3078F DIAST BP <80 MM HG: CPT | Mod: CPTII,S$GLB,, | Performed by: INTERNAL MEDICINE

## 2022-06-24 PROCEDURE — 93010 ELECTROCARDIOGRAM REPORT: CPT | Mod: ,,, | Performed by: INTERNAL MEDICINE

## 2022-06-24 PROCEDURE — 1126F AMNT PAIN NOTED NONE PRSNT: CPT | Mod: CPTII,S$GLB,, | Performed by: INTERNAL MEDICINE

## 2022-06-24 PROCEDURE — 3008F PR BODY MASS INDEX (BMI) DOCUMENTED: ICD-10-PCS | Mod: CPTII,S$GLB,, | Performed by: INTERNAL MEDICINE

## 2022-06-24 PROCEDURE — 1101F PT FALLS ASSESS-DOCD LE1/YR: CPT | Mod: CPTII,S$GLB,, | Performed by: INTERNAL MEDICINE

## 2022-06-24 PROCEDURE — 99285 EMERGENCY DEPT VISIT HI MDM: CPT | Mod: 25

## 2022-06-24 PROCEDURE — 1126F PR PAIN SEVERITY QUANTIFIED, NO PAIN PRESENT: ICD-10-PCS | Mod: CPTII,S$GLB,, | Performed by: INTERNAL MEDICINE

## 2022-06-24 PROCEDURE — 83880 ASSAY OF NATRIURETIC PEPTIDE: CPT | Performed by: EMERGENCY MEDICINE

## 2022-06-24 PROCEDURE — 3288F FALL RISK ASSESSMENT DOCD: CPT | Mod: CPTII,S$GLB,, | Performed by: INTERNAL MEDICINE

## 2022-06-24 PROCEDURE — 93010 EKG 12-LEAD: ICD-10-PCS | Mod: ,,, | Performed by: INTERNAL MEDICINE

## 2022-06-24 PROCEDURE — 81003 URINALYSIS AUTO W/O SCOPE: CPT | Performed by: EMERGENCY MEDICINE

## 2022-06-24 PROCEDURE — 3074F PR MOST RECENT SYSTOLIC BLOOD PRESSURE < 130 MM HG: ICD-10-PCS | Mod: CPTII,S$GLB,, | Performed by: INTERNAL MEDICINE

## 2022-06-24 PROCEDURE — 3288F PR FALLS RISK ASSESSMENT DOCUMENTED: ICD-10-PCS | Mod: CPTII,S$GLB,, | Performed by: INTERNAL MEDICINE

## 2022-06-24 PROCEDURE — 1159F MED LIST DOCD IN RCRD: CPT | Mod: CPTII,S$GLB,, | Performed by: INTERNAL MEDICINE

## 2022-06-24 PROCEDURE — 96360 HYDRATION IV INFUSION INIT: CPT

## 2022-06-24 PROCEDURE — 85025 COMPLETE CBC W/AUTO DIFF WBC: CPT | Performed by: EMERGENCY MEDICINE

## 2022-06-24 PROCEDURE — 84484 ASSAY OF TROPONIN QUANT: CPT | Performed by: EMERGENCY MEDICINE

## 2022-06-24 PROCEDURE — 3044F HG A1C LEVEL LT 7.0%: CPT | Mod: CPTII,S$GLB,, | Performed by: INTERNAL MEDICINE

## 2022-06-24 PROCEDURE — 99499 UNLISTED E&M SERVICE: CPT | Mod: S$GLB,,, | Performed by: INTERNAL MEDICINE

## 2022-06-24 PROCEDURE — 1159F PR MEDICATION LIST DOCUMENTED IN MEDICAL RECORD: ICD-10-PCS | Mod: CPTII,S$GLB,, | Performed by: INTERNAL MEDICINE

## 2022-06-24 PROCEDURE — 3044F PR MOST RECENT HEMOGLOBIN A1C LEVEL <7.0%: ICD-10-PCS | Mod: CPTII,S$GLB,, | Performed by: INTERNAL MEDICINE

## 2022-06-24 PROCEDURE — 99999 PR PBB SHADOW E&M-EST. PATIENT-LVL III: ICD-10-PCS | Mod: PBBFAC,,, | Performed by: INTERNAL MEDICINE

## 2022-06-24 PROCEDURE — 1101F PR PT FALLS ASSESS DOC 0-1 FALLS W/OUT INJ PAST YR: ICD-10-PCS | Mod: CPTII,S$GLB,, | Performed by: INTERNAL MEDICINE

## 2022-06-24 PROCEDURE — 99499 NO LOS: ICD-10-PCS | Mod: S$GLB,,, | Performed by: INTERNAL MEDICINE

## 2022-06-24 PROCEDURE — 25000003 PHARM REV CODE 250: Performed by: FAMILY MEDICINE

## 2022-06-24 PROCEDURE — 82550 ASSAY OF CK (CPK): CPT | Performed by: EMERGENCY MEDICINE

## 2022-06-24 PROCEDURE — 4010F ACE/ARB THERAPY RXD/TAKEN: CPT | Mod: CPTII,S$GLB,, | Performed by: INTERNAL MEDICINE

## 2022-06-24 PROCEDURE — 3074F SYST BP LT 130 MM HG: CPT | Mod: CPTII,S$GLB,, | Performed by: INTERNAL MEDICINE

## 2022-06-24 PROCEDURE — 3008F BODY MASS INDEX DOCD: CPT | Mod: CPTII,S$GLB,, | Performed by: INTERNAL MEDICINE

## 2022-06-24 RX ADMIN — SODIUM CHLORIDE 500 ML: 0.9 INJECTION, SOLUTION INTRAVENOUS at 05:06

## 2022-06-24 NOTE — PROGRESS NOTES
"[facility] Gastroenterology       Blood pressure was 66/54 in GI clinic.   Patient had been feeling "bad " since last night.   Called Dr Jaramillo in the ED.   We are sending patient to ED now.         "

## 2022-06-24 NOTE — ED PROVIDER NOTES
SCRIBE #1 NOTE: IMaddison am scribing for, and in the presence of, Conrad Jaramillo MD. I have scribed the HPI, ROS, and PEx.     SCRIBE #2 NOTE: IArtem, am scribing for, and in the presence of,  Velma Noel MD. I have scribed the remaining portions of the note not scribed by Scribe #1.     History      Chief Complaint   Patient presents with    Hypotension     Weakness x 2 days - sent from GI for BP of 66/54       Review of patient's allergies indicates:   Allergen Reactions    Iodine Anaphylaxis     Shellfish allergy - pt confirms can take Iodine products.    Shellfish containing products Anaphylaxis    Olmesartan Hives        HPI   HPI    6/24/2022, 2:23 PM   History obtained from the patient      History of Present Illness: Raghavendra Luz is a 72 y.o. male patient who presents to the Emergency Department after being referred to the ED by Dr. Blake (Gastroenterology) for hypotension. Per the pt, he was seeing Dr. Blake for a follow up appointment after he had a colonoscopy. During the follow up appointment, the pt's blood pressure was noted to be 66/54, so he was referred to the ED. Since last night, the pt reports that he has been experiencing generalized weakness and fatigue. Symptoms are constant and moderate in severity. No mitigating or exacerbating factors reported. Patient denies any fever, chills, N/V/D, SOB, CP, numbness, appetite change, and all other sxs at this time. No prior Tx reported. No further complaints or concerns at this time.         Arrival mode: Personal vehicle    PCP: PORSHA Dobson Jr, MD       Past Medical History:  Past Medical History:   Diagnosis Date    Asthma     Cataract     CHF (congestive heart failure)     COPD (chronic obstructive pulmonary disease)     GERD (gastroesophageal reflux disease)     Glaucoma     Hypertension        Past Surgical History:  Past Surgical History:   Procedure Laterality Date    ANKLE FRACTURE SURGERY Left      COLONOSCOPY      COLONOSCOPY N/A 5/18/2022    Procedure: COLONOSCOPY;  Surgeon: Sandra Blake MD;  Location: [facility];  Service: Gastroenterology;  Laterality: N/A;    CORONARY ANGIOPLASTY WITH STENT PLACEMENT           Family History:  Family History   Problem Relation Age of Onset    Heart disease Mother     Ovarian cancer Mother     Diabetes Father     Heart disease Father     Brain Hemorrhage Sister     Heart disease Brother        Social History:  Social History     Tobacco Use    Smoking status: Current Every Day Smoker     Packs/day: 1.00     Years: 54.00     Pack years: 54.00     Types: Cigarettes     Start date: 1/1/1970    Smokeless tobacco: Never Used   Substance and Sexual Activity    Alcohol use: Yes    Drug use: Never    Sexual activity: Not Currently       ROS   Review of Systems   Constitutional: Positive for fatigue. Negative for appetite change, chills and fever.   HENT: Negative for sore throat.    Respiratory: Negative for shortness of breath.    Cardiovascular: Negative for chest pain.   Gastrointestinal: Negative for diarrhea, nausea and vomiting.   Genitourinary: Negative for dysuria.   Musculoskeletal: Negative for back pain.   Skin: Negative for rash.   Neurological: Positive for weakness (generalized). Negative for numbness.   Hematological: Does not bruise/bleed easily.   All other systems reviewed and are negative.    Physical Exam      Initial Vitals [06/24/22 1406]   BP Pulse Resp Temp SpO2   101/60 67 18 98.2 °F (36.8 °C) 100 %      MAP       --          Physical Exam  Nursing Notes and Vital Signs Reviewed.  Constitutional: Patient is in no acute distress. Elderly and frail.  Head: Atraumatic. Normocephalic.  Eyes: PERRL. EOM intact. Conjunctivae are not pale. No scleral icterus.  ENT: Mucous membranes are moist. Oropharynx is clear and symmetric.    Neck: Supple. Full ROM. No lymphadenopathy.  Cardiovascular: Regular rate. Regular rhythm. No murmurs, rubs, or  "gallops. Distal pulses are 2+ and symmetric.  Pulmonary/Chest: No respiratory distress. Clear to auscultation bilaterally. No wheezing or rales.  Abdominal: Soft and non-distended.  There is no tenderness.  No rebound, guarding, or rigidity.   Musculoskeletal: Moves all extremities. No obvious deformities. No edema.  Skin: Warm and dry.  Neurological:  Alert, awake, and appropriate.  Normal speech.  No acute focal neurological deficits are appreciated.  Psychiatric: Normal affect. Good eye contact. Appropriate in content.    ED Course    Procedures  ED Vital Signs:  Vitals:    06/24/22 1406 06/24/22 1456 06/24/22 1458 06/24/22 1500   BP: 101/60 108/60 104/60 107/66   Pulse: 67 68 68 69   Resp: 18 17 17 15   Temp: 98.2 °F (36.8 °C)      TempSrc: Oral      SpO2: 100% 98% 97% 98%   Weight: 96.7 kg (213 lb 3 oz)      Height: 6' 3" (1.905 m)       06/24/22 1501 06/24/22 1530 06/24/22 1550 06/24/22 1600   BP: 107/66 99/64 117/63 106/64   Pulse: 68 69 69 68   Resp: (!) 21 18 20 18   Temp:       TempSrc:       SpO2: 98% 98% 98% 97%   Weight:       Height:        06/24/22 1630 06/24/22 1650 06/24/22 1750 06/24/22 1800   BP: 101/67 108/66 117/71 119/75   Pulse: 68 72 74 71   Resp: (!) 23 20 (!) 26 (!) 22   Temp:       TempSrc:       SpO2: 98% 99% 100% 99%   Weight:       Height:           Abnormal Lab Results:  Labs Reviewed   CBC W/ AUTO DIFFERENTIAL - Abnormal; Notable for the following components:       Result Value    Hemoglobin 13.3 (*)     MCV 73 (*)     MCH 23.3 (*)     MCHC 31.7 (*)     RDW 16.9 (*)     All other components within normal limits   COMPREHENSIVE METABOLIC PANEL - Abnormal; Notable for the following components:    Sodium 130 (*)     CO2 20 (*)     Total Bilirubin 1.1 (*)     Alkaline Phosphatase 45 (*)     All other components within normal limits   URINALYSIS, REFLEX TO URINE CULTURE    Narrative:     Specimen Source->Urine   B-TYPE NATRIURETIC PEPTIDE   CK   TROPONIN I        All Lab Results:  Results " for orders placed or performed during the hospital encounter of 06/24/22   CBC Auto Differential   Result Value Ref Range    WBC 5.32 3.90 - 12.70 K/uL    RBC 5.72 4.60 - 6.20 M/uL    Hemoglobin 13.3 (L) 14.0 - 18.0 g/dL    Hematocrit 42.0 40.0 - 54.0 %    MCV 73 (L) 82 - 98 fL    MCH 23.3 (L) 27.0 - 31.0 pg    MCHC 31.7 (L) 32.0 - 36.0 g/dL    RDW 16.9 (H) 11.5 - 14.5 %    Platelets 205 150 - 450 K/uL    MPV 9.9 9.2 - 12.9 fL    Immature Granulocytes 0.4 0.0 - 0.5 %    Gran # (ANC) 2.8 1.8 - 7.7 K/uL    Immature Grans (Abs) 0.02 0.00 - 0.04 K/uL    Lymph # 1.5 1.0 - 4.8 K/uL    Mono # 0.7 0.3 - 1.0 K/uL    Eos # 0.2 0.0 - 0.5 K/uL    Baso # 0.05 0.00 - 0.20 K/uL    nRBC 0 0 /100 WBC    Gran % 53.4 38.0 - 73.0 %    Lymph % 28.8 18.0 - 48.0 %    Mono % 12.4 4.0 - 15.0 %    Eosinophil % 4.1 0.0 - 8.0 %    Basophil % 0.9 0.0 - 1.9 %    Differential Method Automated    Comprehensive Metabolic Panel   Result Value Ref Range    Sodium 130 (L) 136 - 145 mmol/L    Potassium 5.1 3.5 - 5.1 mmol/L    Chloride 99 95 - 110 mmol/L    CO2 20 (L) 23 - 29 mmol/L    Glucose 92 70 - 110 mg/dL    BUN 12 8 - 23 mg/dL    Creatinine 1.2 0.5 - 1.4 mg/dL    Calcium 9.3 8.7 - 10.5 mg/dL    Total Protein 7.1 6.0 - 8.4 g/dL    Albumin 3.9 3.5 - 5.2 g/dL    Total Bilirubin 1.1 (H) 0.1 - 1.0 mg/dL    Alkaline Phosphatase 45 (L) 55 - 135 U/L    AST 17 10 - 40 U/L    ALT 30 10 - 44 U/L    Anion Gap 11 8 - 16 mmol/L    eGFR if African American >60 >60 mL/min/1.73 m^2    eGFR if non African American >60 >60 mL/min/1.73 m^2   Urinalysis, Reflex to Urine Culture Urine, Clean Catch    Specimen: Urine   Result Value Ref Range    Specimen UA Urine, Clean Catch     Color, UA Yellow Yellow, Straw, Anju    Appearance, UA Clear Clear    pH, UA 6.0 5.0 - 8.0    Specific Gravity, UA 1.010 1.005 - 1.030    Protein, UA Negative Negative    Glucose, UA Negative Negative    Ketones, UA Negative Negative    Bilirubin (UA) Negative Negative    Occult Blood UA  Negative Negative    Nitrite, UA Negative Negative    Urobilinogen, UA Negative <2.0 EU/dL    Leukocytes, UA Negative Negative   BNP   Result Value Ref Range    BNP 45 0 - 99 pg/mL   CK   Result Value Ref Range    CPK 44 20 - 200 U/L   Troponin I   Result Value Ref Range    Troponin I <0.006 0.000 - 0.026 ng/mL         Imaging Results:  Imaging Results          X-Ray Chest AP Portable (Final result)  Result time 06/24/22 14:58:24    Final result by Lesia Edge MD (06/24/22 14:58:24)                 Impression:      As above.      Electronically signed by: Lesia Edge  Date:    06/24/2022  Time:    14:58             Narrative:    EXAMINATION:  XR CHEST AP PORTABLE    CLINICAL HISTORY:  weakness;    TECHNIQUE:  Single frontal view of the chest was performed.    COMPARISON:  Chest radiograph 04/20/2022    FINDINGS:  Cardiac defibrillator overlies the upper left hemithorax with single lead terminating over the right ventricle, unchanged in position.No large consolidation, pneumothorax, or significant pleural fluid.  A small nodular opacity overlies the mid left lung, not evident on the comparison film.    The cardiac silhouette is normal in size. Bilateral hilar calcification noted, suggesting prior granulomatous disease.    No acute osseous abnormality.                               The EKG was ordered, reviewed, and independently interpreted by the ED provider.  Interpretation time: 14:35  Rate: 67 BPM  Rhythm: normal sinus rhythm  Interpretation: RBBB. Left anterior fascicular block. Bifascicular block. Inferior infarct, age undetermined. Anterolateral infarct, age undetermined. No STEMI.           The Emergency Provider reviewed the vital signs and test results, which are outlined above.    ED Discussion     4:00 PM: Dr. Jaramillo transfers care of patient to Dr. Noel pending lab results.    5:55 PM: Reassessed pt at this time. Discussed with pt all pertinent ED information and results. Discussed pt  dx and plan of tx. Gave pt all f/u and return to the ED instructions. All questions and concerns were addressed at this time. Pt expresses understanding of information and instructions, and is comfortable with plan to discharge. Pt is stable for discharge.    I discussed with patient and/or family/caretaker that evaluation in the ED does not suggest any emergent or life threatening medical conditions requiring immediate intervention beyond what was provided in the ED, and I believe patient is safe for discharge.  Regardless, an unremarkable evaluation in the ED does not preclude the development or presence of a serious of life threatening condition. As such, patient was instructed to return immediately for any worsening or change in current symptoms.         ED Medication(s):  Medications   sodium chloride 0.9% bolus 500 mL (0 mLs Intravenous Stopped 6/24/22 1800)        Follow-up Information     E Amarjit Dobson Jr, MD. Schedule an appointment as soon as possible for a visit in 1 day.    Specialties: Internal Medicine, Pediatrics  Contact information:  [address]  [place] [address]  [phone]                         Discharge Medication List as of 6/24/2022  5:54 PM            Medical Decision Making    Medical Decision Making:   Clinical Tests:   Lab Tests: Ordered and Reviewed  Radiological Study: Ordered and Reviewed  Medical Tests: Ordered and Reviewed           Scribe Attestation:   Scribe #1: I performed the above scribed service and the documentation accurately describes the services I performed. I attest to the accuracy of the note.    Attending:   Physician Attestation Statement for Scribe #1: I, Conrad Jaramillo MD, personally performed the services described in this documentation, as scribed by Maddison Guzman, in my presence, and it is both accurate and complete.       Scribe Attestation:   Scribe #2: I performed the above scribed service and the documentation accurately describes the  services I performed. I attest to the accuracy of the note.    Attending Attestation:           Physician Attestation for Scribe:    Physician Attestation Statement for Scribe #2: I, Velma Noel MD, reviewed documentation, as scribed by Artem Ni in my presence, and it is both accurate and complete. I also acknowledge and confirm the content of the note done by Scribe #1.          Clinical Impression       ICD-10-CM ICD-9-CM   1. Hypotension, unspecified hypotension type  I95.9 458.9   2. Weakness  R53.1 780.79               Velma Noel MD  06/25/22 1394

## 2022-06-24 NOTE — ED NOTES
Pt assisted to vehicle with no complications. Pt verbalized understanding of discharge instructions

## 2022-07-14 ENCOUNTER — OFFICE VISIT (OUTPATIENT)
Dept: OPHTHALMOLOGY | Facility: CLINIC | Age: 72
End: 2022-07-14
Payer: MEDICARE

## 2022-07-14 DIAGNOSIS — H26.9 CORTICAL CATARACT OF BOTH EYES: ICD-10-CM

## 2022-07-14 DIAGNOSIS — H40.1133 PRIMARY OPEN ANGLE GLAUCOMA (POAG) OF BOTH EYES, SEVERE STAGE: Primary | ICD-10-CM

## 2022-07-14 DIAGNOSIS — H25.13 NUCLEAR SCLEROSIS OF BOTH EYES: ICD-10-CM

## 2022-07-14 PROCEDURE — 3044F PR MOST RECENT HEMOGLOBIN A1C LEVEL <7.0%: ICD-10-PCS | Mod: CPTII,S$GLB,, | Performed by: OPHTHALMOLOGY

## 2022-07-14 PROCEDURE — 3044F HG A1C LEVEL LT 7.0%: CPT | Mod: CPTII,S$GLB,, | Performed by: OPHTHALMOLOGY

## 2022-07-14 PROCEDURE — 1160F RVW MEDS BY RX/DR IN RCRD: CPT | Mod: CPTII,S$GLB,, | Performed by: OPHTHALMOLOGY

## 2022-07-14 PROCEDURE — 99999 PR PBB SHADOW E&M-EST. PATIENT-LVL II: CPT | Mod: PBBFAC,,, | Performed by: OPHTHALMOLOGY

## 2022-07-14 PROCEDURE — 1159F MED LIST DOCD IN RCRD: CPT | Mod: CPTII,S$GLB,, | Performed by: OPHTHALMOLOGY

## 2022-07-14 PROCEDURE — 1159F PR MEDICATION LIST DOCUMENTED IN MEDICAL RECORD: ICD-10-PCS | Mod: CPTII,S$GLB,, | Performed by: OPHTHALMOLOGY

## 2022-07-14 PROCEDURE — 99214 OFFICE O/P EST MOD 30 MIN: CPT | Mod: S$GLB,,, | Performed by: OPHTHALMOLOGY

## 2022-07-14 PROCEDURE — 99214 PR OFFICE/OUTPT VISIT, EST, LEVL IV, 30-39 MIN: ICD-10-PCS | Mod: S$GLB,,, | Performed by: OPHTHALMOLOGY

## 2022-07-14 PROCEDURE — 99999 PR PBB SHADOW E&M-EST. PATIENT-LVL II: ICD-10-PCS | Mod: PBBFAC,,, | Performed by: OPHTHALMOLOGY

## 2022-07-14 PROCEDURE — 4010F PR ACE/ARB THEARPY RXD/TAKEN: ICD-10-PCS | Mod: CPTII,S$GLB,, | Performed by: OPHTHALMOLOGY

## 2022-07-14 PROCEDURE — 4010F ACE/ARB THERAPY RXD/TAKEN: CPT | Mod: CPTII,S$GLB,, | Performed by: OPHTHALMOLOGY

## 2022-07-14 PROCEDURE — 1160F PR REVIEW ALL MEDS BY PRESCRIBER/CLIN PHARMACIST DOCUMENTED: ICD-10-PCS | Mod: CPTII,S$GLB,, | Performed by: OPHTHALMOLOGY

## 2022-07-14 NOTE — PROGRESS NOTES
SUBJECTIVE  Raghavendra JUANA Luz is 72 y.o. male  Uncorrected distance visual acuity was 20/400 in the right eye and 20/60 in the left eye.   Chief Complaint   Patient presents with    Glaucoma     Patient reports for 3 month IOP check. Using gtts as advised. Denies pain or irritation at this time. Patient reports of visual stability since previous visit. Patient states rocklatan is too costly, $47 a month.             HPI     Glaucoma      Additional comments: Patient reports for 3 month IOP check. Using gtts   as advised. Denies pain or irritation at this time. Patient reports of   visual stability since previous visit. Patient states rocklatan is too   costly, $47 a month.                 Comments     Insurance changed from Dr. Schaefer  Self Referral-Dr. Carine Trejo was his previous doctor    1. Severe COAG Fhx Father goal=15-16  Glaucoma surgery OU 2009 (valve OU with Dr. Guzman)  2. NS OU  Cortical cataract     Rocklatan qhs OU  Brimonidine BID OU     Old Records- Neil  4/09 NCT 22/25 c/d 1.0/0.8  7/14 Ta 18/19  11/15 Ta 18/18 11/19 c/d 1.0/0.8 Ta 18/18 refer to Jaqui            Last edited by Waylon Brown on 7/14/2022  2:59 PM. (History)         Assessment /Plan :  1. Primary open angle glaucoma (POAG) of both eyes, severe stage Doing well, intraocular pressure (IOP) within acceptable range relative to target IOP and no evidence of progression. Continue current treatment. Reviewed importance of continued compliance with treatment and follow up.      Patient instructed to continue using the following glaucoma medication as follows:  Bimatoprost (Lumigan) one drop in each eye nightly and Brimoninide one drop in each eye every 12 hours     Hold Rocklatan for cost    Return to clinic in 3 months  or as needed.  With IOP Check and GOCT     2. Cortical cataract of both eyes  -- Condition stable, no therapeutic change required. Monitoring routinely.   3. Nuclear sclerosis of both eyes  -- Condition stable, no  therapeutic change required. Monitoring routinely.

## 2022-07-21 ENCOUNTER — PATIENT MESSAGE (OUTPATIENT)
Dept: GASTROENTEROLOGY | Facility: CLINIC | Age: 72
End: 2022-07-21

## 2022-07-21 ENCOUNTER — OFFICE VISIT (OUTPATIENT)
Dept: GASTROENTEROLOGY | Facility: CLINIC | Age: 72
End: 2022-07-21
Payer: MEDICARE

## 2022-07-21 ENCOUNTER — TELEPHONE (OUTPATIENT)
Dept: GASTROENTEROLOGY | Facility: CLINIC | Age: 72
End: 2022-07-21

## 2022-07-21 DIAGNOSIS — K63.3 ULCER OF INTESTINE: ICD-10-CM

## 2022-07-21 DIAGNOSIS — Z00.00 GENERAL MEDICAL EXAM: Primary | ICD-10-CM

## 2022-07-21 PROCEDURE — 4010F ACE/ARB THERAPY RXD/TAKEN: CPT | Mod: CPTII,95,, | Performed by: INTERNAL MEDICINE

## 2022-07-21 PROCEDURE — 99214 PR OFFICE/OUTPT VISIT, EST, LEVL IV, 30-39 MIN: ICD-10-PCS | Mod: 95,,, | Performed by: INTERNAL MEDICINE

## 2022-07-21 PROCEDURE — 3044F HG A1C LEVEL LT 7.0%: CPT | Mod: CPTII,95,, | Performed by: INTERNAL MEDICINE

## 2022-07-21 PROCEDURE — 4010F PR ACE/ARB THEARPY RXD/TAKEN: ICD-10-PCS | Mod: CPTII,95,, | Performed by: INTERNAL MEDICINE

## 2022-07-21 PROCEDURE — 99214 OFFICE O/P EST MOD 30 MIN: CPT | Mod: 95,,, | Performed by: INTERNAL MEDICINE

## 2022-07-21 PROCEDURE — 3044F PR MOST RECENT HEMOGLOBIN A1C LEVEL <7.0%: ICD-10-PCS | Mod: CPTII,95,, | Performed by: INTERNAL MEDICINE

## 2022-07-21 RX ORDER — SODIUM, POTASSIUM,MAG SULFATES 17.5-3.13G
1 SOLUTION, RECONSTITUTED, ORAL ORAL DAILY
Qty: 1 KIT | Refills: 0 | Status: SHIPPED | OUTPATIENT
Start: 2022-07-21 | End: 2022-07-23

## 2022-07-21 NOTE — ASSESSMENT & PLAN NOTE
-Path with indefinate dysplasia   -Will repeat colonoscopy at this time  -Bowel prep has been ordered

## 2022-07-21 NOTE — PROGRESS NOTES
Clinic Progress Note:  [facility] Progress Note    Reason for Visit:  The primary encounter diagnosis was General medical exam. A diagnosis of Ulcer of intestine was also pertinent to this visit.    PCP: PORSHA Dobson Jr          Visit type: audiovisual      20 minutes of total time spent on the encounter, which includes face to face time and non-face to face time preparing to see the patient (eg, review of tests), Obtaining and/or reviewing separately obtained history, Documenting clinical information in the electronic or other health record, Independently interpreting results (not separately reported) and communicating results to the patient/family/caregiver, or Care coordination (not separately reported).   Each patient to whom he or she provides medical services by telemedicine is: (1) informed of the relationship between the physician and patient and the respective role of any other health care provider with respect to management of the patient; and (2) notified that he or she may decline to receive medical services by telemedicine and may withdraw from such care at any time.         HPI:  This is a 72 y.o. male here for evaluation of abnormal path results.   Patient had a colonoscopy with me on 5/18/2022 for positive cologuard test.   Colonoscopy revealed normal TI, ulcerated mucosa at ICV, and polyps.   Path results came back for tubular adenomas and ulcer was indeterminate for dysplasia.     ROS:  As per HPI       Allergies: Reviewed    Home Medications:   Medication List with Changes/Refills   New Medications    SODIUM,POTASSIUM,MAG SULFATES (SUPREP BOWEL PREP KIT) 17.5-3.13-1.6 GRAM SOLR    Take 177 mLs by mouth once daily. for 2 days   Current Medications    ALBUTEROL (PROVENTIL/VENTOLIN HFA) 90 MCG/ACTUATION INHALER    Inhale 2 puffs into the lungs every 4 (four) hours as needed for Wheezing or Shortness of Breath.    APIXABAN (ELIQUIS) 5 MG TAB    Take 1 tablet (5 mg total) by mouth 2  (two) times daily.    ASPIRIN (ECOTRIN) 81 MG EC TABLET    Take 81 mg by mouth.    ATORVASTATIN (LIPITOR) 80 MG TABLET    Take 1 tablet (80 mg total) by mouth once daily.    BIMATOPROST (LUMIGAN) 0.01 % DROP    Place 1 drop into both eyes every evening.    BRIMONIDINE 0.2% (ALPHAGAN) 0.2 % DROP    Place 1 drop into both eyes every 12 (twelve) hours.    CARVEDILOL (COREG) 25 MG TABLET    Take 1 tablet (25 mg total) by mouth 2 (two) times daily with meals.    FUROSEMIDE (LASIX) 40 MG TABLET    Take 1 tablet (40 mg total) by mouth daily as needed (swelling).    LISINOPRIL (PRINIVIL,ZESTRIL) 40 MG TABLET    Take 1 tablet (40 mg total) by mouth once daily.    NETARSUDIL-LATANOPROST (ROCKLATAN) 0.02-0.005 % DROP    Place 1 drop into both eyes every evening.    NITROGLYCERIN (NITROSTAT) 0.4 MG SL TABLET    PLACE 1 TABLET UNDER THE TONGUE EVERY 5 (FIVE) MINUTES AS NEEDED FOR CHEST PAIN.MAX 3 DOSES IN15 MIN    SPIRONOLACTONE (ALDACTONE) 100 MG TABLET    Take 1 tablet (100 mg total) by mouth once daily.    TRAZODONE (DESYREL) 50 MG TABLET    Take 1 tablet (50 mg total) by mouth every evening.         Physical Exam:  Vital Signs:  There were no vitals taken for this visit.  There is no height or weight on file to calculate BMI.    Physical Exam  Vitals reviewed: virtual visit.          Labs: Pertinent labs reviewed.      Assessment:  Problem List Items Addressed This Visit        GI    Ulcer of intestine    Current Assessment & Plan     -Path with indefinate dysplasia   -Will repeat colonoscopy at this time  -Bowel prep has been ordered             Other Visit Diagnoses     General medical exam    -  Primary        General medical exam  -     Case Request Endoscopy: COLONOSCOPY    Ulcer of intestine    Other orders  -     sodium,potassium,mag sulfates (SUPREP BOWEL PREP KIT) 17.5-3.13-1.6 gram SolR; Take 177 mLs by mouth once daily. for 2 days  Dispense: 1 kit; Refill: 0              Follow-up after colonoscopy.       Order  summary:  No orders of the defined types were placed in this encounter.      Thank you so much for allowing me to participate in the care of Raghavendra Blake MD  Gastroenterology and Hepatology  [facility]

## 2022-07-28 ENCOUNTER — HOSPITAL ENCOUNTER (OUTPATIENT)
Dept: PREADMISSION TESTING | Facility: HOSPITAL | Age: 72
Discharge: HOME | End: 2022-07-28
Attending: PEDIATRICS
Payer: MEDICARE

## 2022-08-11 ENCOUNTER — PATIENT MESSAGE (OUTPATIENT)
Dept: OPHTHALMOLOGY | Facility: CLINIC | Age: 72
End: 2022-08-11
Payer: MEDICARE

## 2022-08-11 ENCOUNTER — PATIENT MESSAGE (OUTPATIENT)
Dept: INTERNAL MEDICINE | Facility: CLINIC | Age: 72
End: 2022-08-11
Payer: MEDICARE

## 2022-08-11 NOTE — TELEPHONE ENCOUNTER
Letter is as follows    To Whom It May Concern,    Mr Raghavendra Luz( 52) is a patient of mine and he and his family ask that I dictate this letter. He has multiple medical problems, COPD, CHF, ischemic cardiomyopathy, implanted defibrillator, Hx pulmonary emboli, A fib, hyponatremia, as well as others. As such it is difficult for he and his family to provide for his ADLs. Please consider this in his petition for home nursing/aide.    Sincerely,

## 2022-08-16 ENCOUNTER — OFFICE VISIT (OUTPATIENT)
Dept: PULMONOLOGY | Facility: CLINIC | Age: 72
End: 2022-08-16
Payer: MEDICARE

## 2022-08-16 ENCOUNTER — TELEPHONE (OUTPATIENT)
Dept: PULMONOLOGY | Facility: CLINIC | Age: 72
End: 2022-08-16

## 2022-08-16 ENCOUNTER — CLINICAL SUPPORT (OUTPATIENT)
Dept: PULMONOLOGY | Facility: CLINIC | Age: 72
End: 2022-08-16
Payer: MEDICARE

## 2022-08-16 VITALS
SYSTOLIC BLOOD PRESSURE: 108 MMHG | HEIGHT: 75 IN | HEART RATE: 74 BPM | RESPIRATION RATE: 18 BRPM | BODY MASS INDEX: 26.81 KG/M2 | WEIGHT: 215.63 LBS | DIASTOLIC BLOOD PRESSURE: 61 MMHG | OXYGEN SATURATION: 99 %

## 2022-08-16 VITALS — WEIGHT: 215.63 LBS | BODY MASS INDEX: 26.81 KG/M2 | HEIGHT: 75 IN

## 2022-08-16 DIAGNOSIS — J41.8 MIXED SIMPLE AND MUCOPURULENT CHRONIC BRONCHITIS: ICD-10-CM

## 2022-08-16 DIAGNOSIS — F17.200 NICOTINE DEPENDENCE WITH CURRENT USE: ICD-10-CM

## 2022-08-16 DIAGNOSIS — I26.94 MULTIPLE SUBSEGMENTAL PULMONARY EMBOLI WITHOUT ACUTE COR PULMONALE: ICD-10-CM

## 2022-08-16 DIAGNOSIS — G47.34 NOCTURNAL HYPOXEMIA: ICD-10-CM

## 2022-08-16 DIAGNOSIS — F17.210 CIGARETTE SMOKER: ICD-10-CM

## 2022-08-16 DIAGNOSIS — Z01.811 PRE-OPERATIVE RESPIRATORY EXAMINATION: ICD-10-CM

## 2022-08-16 DIAGNOSIS — J41.8 MIXED SIMPLE AND MUCOPURULENT CHRONIC BRONCHITIS: Primary | ICD-10-CM

## 2022-08-16 DIAGNOSIS — F17.210 NICOTINE DEPENDENCE, CIGARETTES, UNCOMPLICATED: ICD-10-CM

## 2022-08-16 DIAGNOSIS — R09.02 EXERCISE HYPOXEMIA: ICD-10-CM

## 2022-08-16 LAB
BRPFT: NORMAL
DLCO ADJ PRE: 14.86 ML/(MIN*MMHG)
DLCO SINGLE BREATH LLN: 24.44
DLCO SINGLE BREATH PRE REF: 47.4 %
DLCO SINGLE BREATH REF: 31.37
DLCOC SBVA LLN: 2.83
DLCOC SBVA PRE REF: 98.4 %
DLCOC SBVA REF: 3.83
DLCOC SINGLE BREATH LLN: 24.44
DLCOC SINGLE BREATH PRE REF: 47.4 %
DLCOC SINGLE BREATH REF: 31.37
DLCOVA LLN: 2.83
DLCOVA PRE REF: 98.4 %
DLCOVA PRE: 3.77 ML/(MIN*MMHG*L)
DLCOVA REF: 3.83
DLVAADJ PRE: 3.77 ML/(MIN*MMHG*L)
ERV LLN: -16448.82
ERV PRE REF: 38.3 %
ERV REF: 1.18
FEF 25 75 CHG: 17.2 %
FEF 25 75 LLN: 0.82
FEF 25 75 POST REF: 27.1 %
FEF 25 75 PRE REF: 23.1 %
FEF 25 75 REF: 2.34
FET100 CHG: -12.2 %
FEV1 CHG: 3.5 %
FEV1 FVC CHG: 6.8 %
FEV1 FVC LLN: 63
FEV1 FVC POST REF: 82.2 %
FEV1 FVC PRE REF: 76.9 %
FEV1 FVC REF: 76
FEV1 LLN: 2.17
FEV1 POST REF: 34.9 %
FEV1 PRE REF: 33.7 %
FEV1 REF: 3.17
FRCPLETH LLN: 3.03
FRCPLETH PREREF: 133.5 %
FRCPLETH REF: 4.02
FVC CHG: -3.2 %
FVC LLN: 3.03
FVC POST REF: 42.2 %
FVC PRE REF: 43.6 %
FVC REF: 4.22
IVC PRE: 1.71 L
IVC SINGLE BREATH LLN: 3.03
IVC SINGLE BREATH PRE REF: 40.6 %
IVC SINGLE BREATH REF: 4.22
MVV LLN: 125
MVV PRE REF: 19.1 %
MVV REF: 147
PEF CHG: 7 %
PEF LLN: 6.24
PEF POST REF: 27.2 %
PEF PRE REF: 25.5 %
PEF REF: 9.3
POST FEF 25 75: 0.63 L/S
POST FET 100: 5.3 SEC
POST FEV1 FVC: 62.08 %
POST FEV1: 1.1 L
POST FVC: 1.78 L
POST PEF: 2.53 L/S
PRE DLCO: 14.86 ML/(MIN*MMHG)
PRE ERV: 0.45 L
PRE FEF 25 75: 0.54 L/S
PRE FET 100: 6.04 SEC
PRE FEV1 FVC: 58.11 %
PRE FEV1: 1.07 L
PRE FRC PL: 5.36 L
PRE FVC: 1.84 L
PRE MVV: 28 L/MIN
PRE PEF: 2.37 L/S
PRE RV: 4.86 L
PRE TLC: 6.92 L
RAW LLN: 3.06
RAW PRE REF: 297.1 %
RAW PRE: 9.09 CMH2O*S/L
RAW REF: 3.06
RV LLN: 2.16
RV PRE REF: 171.6 %
RV REF: 2.83
RVTLC LLN: 33
RVTLC PRE REF: 168.7 %
RVTLC PRE: 70.25 %
RVTLC REF: 42
TLC LLN: 7.03
TLC PRE REF: 84.6 %
TLC REF: 8.18
VA PRE: 3.95 L
VA SINGLE BREATH LLN: 8.03
VA SINGLE BREATH PRE REF: 49.2 %
VA SINGLE BREATH REF: 8.03
VC LLN: 3.03
VC PRE REF: 48.8 %
VC PRE: 2.06 L
VC REF: 4.22
VTGRAWPRE: 5.48 L

## 2022-08-16 PROCEDURE — 94729 DIFFUSING CAPACITY: CPT | Mod: S$GLB,,, | Performed by: INTERNAL MEDICINE

## 2022-08-16 PROCEDURE — 4010F ACE/ARB THERAPY RXD/TAKEN: CPT | Mod: CPTII,S$GLB,, | Performed by: INTERNAL MEDICINE

## 2022-08-16 PROCEDURE — 3044F PR MOST RECENT HEMOGLOBIN A1C LEVEL <7.0%: ICD-10-PCS | Mod: CPTII,S$GLB,, | Performed by: INTERNAL MEDICINE

## 2022-08-16 PROCEDURE — 94618 PULMONARY STRESS TESTING: ICD-10-PCS | Mod: S$GLB,,, | Performed by: INTERNAL MEDICINE

## 2022-08-16 PROCEDURE — 3008F BODY MASS INDEX DOCD: CPT | Mod: CPTII,S$GLB,, | Performed by: INTERNAL MEDICINE

## 2022-08-16 PROCEDURE — 3288F PR FALLS RISK ASSESSMENT DOCUMENTED: ICD-10-PCS | Mod: CPTII,S$GLB,, | Performed by: INTERNAL MEDICINE

## 2022-08-16 PROCEDURE — 1160F RVW MEDS BY RX/DR IN RCRD: CPT | Mod: CPTII,S$GLB,, | Performed by: INTERNAL MEDICINE

## 2022-08-16 PROCEDURE — 99999 PR PBB SHADOW E&M-EST. PATIENT-LVL I: ICD-10-PCS | Mod: PBBFAC,,,

## 2022-08-16 PROCEDURE — 94618 PULMONARY STRESS TESTING: CPT | Mod: S$GLB,,, | Performed by: INTERNAL MEDICINE

## 2022-08-16 PROCEDURE — 99999 PR PBB SHADOW E&M-EST. PATIENT-LVL I: CPT | Mod: PBBFAC,,,

## 2022-08-16 PROCEDURE — 3078F PR MOST RECENT DIASTOLIC BLOOD PRESSURE < 80 MM HG: ICD-10-PCS | Mod: CPTII,S$GLB,, | Performed by: INTERNAL MEDICINE

## 2022-08-16 PROCEDURE — 99999 PR PBB SHADOW E&M-EST. PATIENT-LVL V: CPT | Mod: PBBFAC,,, | Performed by: INTERNAL MEDICINE

## 2022-08-16 PROCEDURE — 1101F PR PT FALLS ASSESS DOC 0-1 FALLS W/OUT INJ PAST YR: ICD-10-PCS | Mod: CPTII,S$GLB,, | Performed by: INTERNAL MEDICINE

## 2022-08-16 PROCEDURE — 1159F PR MEDICATION LIST DOCUMENTED IN MEDICAL RECORD: ICD-10-PCS | Mod: CPTII,S$GLB,, | Performed by: INTERNAL MEDICINE

## 2022-08-16 PROCEDURE — 3008F PR BODY MASS INDEX (BMI) DOCUMENTED: ICD-10-PCS | Mod: CPTII,S$GLB,, | Performed by: INTERNAL MEDICINE

## 2022-08-16 PROCEDURE — 94060 PR EVAL OF BRONCHOSPASM: ICD-10-PCS | Mod: 59,S$GLB,, | Performed by: INTERNAL MEDICINE

## 2022-08-16 PROCEDURE — 99999 PR PBB SHADOW E&M-EST. PATIENT-LVL V: ICD-10-PCS | Mod: PBBFAC,,, | Performed by: INTERNAL MEDICINE

## 2022-08-16 PROCEDURE — 4010F PR ACE/ARB THEARPY RXD/TAKEN: ICD-10-PCS | Mod: CPTII,S$GLB,, | Performed by: INTERNAL MEDICINE

## 2022-08-16 PROCEDURE — 3074F SYST BP LT 130 MM HG: CPT | Mod: CPTII,S$GLB,, | Performed by: INTERNAL MEDICINE

## 2022-08-16 PROCEDURE — 3074F PR MOST RECENT SYSTOLIC BLOOD PRESSURE < 130 MM HG: ICD-10-PCS | Mod: CPTII,S$GLB,, | Performed by: INTERNAL MEDICINE

## 2022-08-16 PROCEDURE — 94726 PLETHYSMOGRAPHY LUNG VOLUMES: CPT | Mod: S$GLB,,, | Performed by: INTERNAL MEDICINE

## 2022-08-16 PROCEDURE — 94060 EVALUATION OF WHEEZING: CPT | Mod: 59,S$GLB,, | Performed by: INTERNAL MEDICINE

## 2022-08-16 PROCEDURE — 1159F MED LIST DOCD IN RCRD: CPT | Mod: CPTII,S$GLB,, | Performed by: INTERNAL MEDICINE

## 2022-08-16 PROCEDURE — 1101F PT FALLS ASSESS-DOCD LE1/YR: CPT | Mod: CPTII,S$GLB,, | Performed by: INTERNAL MEDICINE

## 2022-08-16 PROCEDURE — 99215 OFFICE O/P EST HI 40 MIN: CPT | Mod: 25,S$GLB,, | Performed by: INTERNAL MEDICINE

## 2022-08-16 PROCEDURE — 1160F PR REVIEW ALL MEDS BY PRESCRIBER/CLIN PHARMACIST DOCUMENTED: ICD-10-PCS | Mod: CPTII,S$GLB,, | Performed by: INTERNAL MEDICINE

## 2022-08-16 PROCEDURE — 3288F FALL RISK ASSESSMENT DOCD: CPT | Mod: CPTII,S$GLB,, | Performed by: INTERNAL MEDICINE

## 2022-08-16 PROCEDURE — 99215 PR OFFICE/OUTPT VISIT, EST, LEVL V, 40-54 MIN: ICD-10-PCS | Mod: 25,S$GLB,, | Performed by: INTERNAL MEDICINE

## 2022-08-16 PROCEDURE — 94726 PULM FUNCT TST PLETHYSMOGRAP: ICD-10-PCS | Mod: S$GLB,,, | Performed by: INTERNAL MEDICINE

## 2022-08-16 PROCEDURE — 3044F HG A1C LEVEL LT 7.0%: CPT | Mod: CPTII,S$GLB,, | Performed by: INTERNAL MEDICINE

## 2022-08-16 PROCEDURE — 3078F DIAST BP <80 MM HG: CPT | Mod: CPTII,S$GLB,, | Performed by: INTERNAL MEDICINE

## 2022-08-16 PROCEDURE — 94729 PR C02/MEMBANE DIFFUSE CAPACITY: ICD-10-PCS | Mod: S$GLB,,, | Performed by: INTERNAL MEDICINE

## 2022-08-16 RX ORDER — METHYLPREDNISOLONE 4 MG/1
TABLET ORAL
Qty: 1 EACH | Refills: 0 | Status: SHIPPED | OUTPATIENT
Start: 2022-08-16 | End: 2023-03-30

## 2022-08-16 RX ORDER — ALBUTEROL SULFATE 0.83 MG/ML
2.5 SOLUTION RESPIRATORY (INHALATION)
Qty: 360 ML | Refills: 11 | Status: SHIPPED | OUTPATIENT
Start: 2022-08-16 | End: 2023-08-16

## 2022-08-16 RX ORDER — AZITHROMYCIN 250 MG/1
TABLET, FILM COATED ORAL
Qty: 6 TABLET | Refills: 0 | Status: SHIPPED | OUTPATIENT
Start: 2022-08-16 | End: 2023-03-30 | Stop reason: ALTCHOICE

## 2022-08-16 NOTE — ASSESSMENT & PLAN NOTE
Clearance for surgery:  The patient is at an increased risk of complications from surgery due to multiple medical problems including COPD. Vaccination status reviewed and updated. Risks of possible complications of surgery discussed in detail including risk of respiratory failure requiring mechanical ventilation, post operative pneumonia, deep venous thrombosis, pulmonary embolism and death.  Pulmonary function studies, arterial blood gases and chest X ray reports are independently reviewed. Methods of improving lung function prior to surgery including incentive spirometry, regular use of bronchodilators, exercise and respiratory toilet discussed. Patient voices understanding of increased risks involved due to compromised pulmonary status and the need to comply with treatment plan prior to surgery.  The patient is cleared for anesthesia and surgery from a pulmonary standpoint.

## 2022-08-16 NOTE — PROCEDURES
"[facility] [place] 3rdFl  Six Minute Walk   SUMMARY   Ordering Provider: Alexi Regalado MD   Interpreting Provider: Alexi Regalado MD  Performing nurse/tech/RT: V. T., RT  Diagnosis:  (Mixed simple and mucopurulent chronic bronchitis; Exercise hypoxemia)  Height: 6' 3" (190.5 cm)  Weight: 97.8 kg (215 lb 9.8 oz)  BMI (Calculated): 26.9   Patient Race:    Phase Oxygen Assessment Supplemental O2 Heart   Rate Blood Pressure [place] Dyspnea Scale Rating   Resting 99 % Room Air 67 bpm 108/67 2   Exercise        Minute        1 97 % Room Air 77 bpm     2 97 % Room Air 78 bpm     3 98 % Room Air 81 bpm     4 98 % Room Air 83 bpm     5 99 % Room Air 74 bpm     6  99 % Room Air 71 bpm 108/61 3   Recovery        Minute        1 99 % Room Air 74 bpm     2 99 % Room Air 72 bpm     3 99 % Room Air 73 bpm     4 100 % Room Air 73 bpm 101/63 2     Six Minute Walk Summary  6MWT Status: completed with stops  Patient Reported: Dyspnea, Other (Comment) (back pain)     Interpretation:  Did the patient stop or pause?: Yes  How many times did the patient stop or pause?: 1  Stop Time 1: 285  Restart Time 1: 360  Pause Time 1: 75 seconds            Total Time Walked (Calculated): 285 seconds  Final Partial Lap Distance (feet): 0 feet  Total Distance Meters (Calculated): 60.96 meters  Predicted Distance Meters (Calculated): 604.54 meters  Percentage of Predicted (Calculated): 10.08  Peak VO2 (Calculated): 5.81  Mets: 1.66  Has The Patient Had a Previous Six Minute Walk Test?: No       Previous 6MWT Results  Has The Patient Had a Previous Six Minute Walk Test?: No      Interpretation:  Total distance walked in six minutes is severely reduced indicating a reduction in overall  functional capacity. The patient did not meet criteria for supplemental oxygen prescription.    Clinical correlation suggested.  [] Mild exercise-induced hypoxemia described as an arterial oxygen saturation of 93-95% (or 3-4% less than at " rest),  [] Moderate exercise-induced hypoxemia as 89-93%  [] Severe exercise induced hypoxemia as < 89% O2 saturation.  Medicare Criteria for oxygen prescription comments:   When arterial oxygen saturation is at or below 88% during exercise on room air (severe exercise induced hypoxemia) then the patient falls under Medicare Group 1 criteria for supplemental oxygen prescription.  Details about Medicare Group Criteria coverage can be found at http://www.cms.[facility].gov/manuals/downloads/     Alexi Regalado MD

## 2022-08-16 NOTE — PROGRESS NOTES
Subjective:     Patient ID: Raghavendra Luz is a 72 y.o. male.    Chief Complaint:  Follow up for Chronic Obstructive Pulmonary Disease , repeat colonoscopy scheduled for 9/7/2022    Shortness of Breath  Associated symptoms include leg swelling.      COPD  He presents for evaluation and treatment of COPD. The patient is currently having symptoms / an exacerbation. Current symptoms include chronic dyspnea, cough productive of clear sputum in small amounts and wheezing. Symptoms have been present since several weeks ago and have been unchanged. He denies chills and fever. Associated symptoms include poor exercise tolerance.  This episode appears to have been triggered by no identifiable factor. Treatments tried for the current exacerbation: albuterol inhaler. The patient has been having similar episodes for approximately 5 years. He uses 1 pillows at night. Patient currently is not on home oxygen therapy.. The patient is having no constitutional symptoms, denying fever, chills, anorexia, or weight loss. The patient has been hospitalized for this condition before. He has a history of 54 pack years. The patient is experiencing exercise intolerance (difficulty walking 10 feet on flat ground).  Dyspnea  Patient complains of shortness of breath. Symptoms occur while getting dressed, with one block walking. Symptoms began 1 year ago, gradually worsening since. Associated symptoms include  dyspnea on exertion, productive cough and shortness of breath. He denies chest pain, located left chest. He does not have had recent travel. Weight has been stable. Symptoms are exacerbated by moderate activity. Symptoms are alleviated by rest.     history of Pulmonary Embolism  Hx of dvt - left leg  On Eliquis  Followed by Dr. Butler - cardiology    Positive occult blood test (On eliquis)  Hx of polyps of colon in brother      Past Medical History:   Diagnosis Date    Asthma     Cataract     CHF (congestive heart failure)     COPD  (chronic obstructive pulmonary disease)     GERD (gastroesophageal reflux disease)     Glaucoma     Hypertension      Past Surgical History:   Procedure Laterality Date    ANKLE FRACTURE SURGERY Left     COLONOSCOPY      COLONOSCOPY N/A 5/18/2022    Procedure: COLONOSCOPY;  Surgeon: Sandra Blake MD;  Location[facility];  Service: Gastroenterology;  Laterality: N/A;    CORONARY ANGIOPLASTY WITH STENT PLACEMENT       Review of patient's allergies indicates:   Allergen Reactions    Iodine Anaphylaxis     Shellfish allergy - pt confirms can take Iodine products.    Shellfish containing products Anaphylaxis    Olmesartan Hives     Current Outpatient Medications on File Prior to Visit   Medication Sig Dispense Refill    albuterol (PROVENTIL/VENTOLIN HFA) 90 mcg/actuation inhaler Inhale 2 puffs into the lungs every 4 (four) hours as needed for Wheezing or Shortness of Breath. 18 g 11    apixaban (ELIQUIS) 5 mg Tab Take 1 tablet (5 mg total) by mouth 2 (two) times daily. 180 tablet 3    aspirin (ECOTRIN) 81 MG EC tablet Take 81 mg by mouth.      atorvastatin (LIPITOR) 80 MG tablet Take 1 tablet (80 mg total) by mouth once daily. 90 tablet 1    bimatoprost (LUMIGAN) 0.01 % Drop Place 1 drop into both eyes every evening. 2.5 mL 11    brimonidine 0.2% (ALPHAGAN) 0.2 % Drop Place 1 drop into both eyes every 12 (twelve) hours. 10 mL 12    furosemide (LASIX) 40 MG tablet Take 1 tablet (40 mg total) by mouth daily as needed (swelling). 90 tablet 0    lisinopriL (PRINIVIL,ZESTRIL) 40 MG tablet Take 1 tablet (40 mg total) by mouth once daily. 90 tablet 1    netarsudiL-latanoprost (ROCKLATAN) 0.02-0.005 % Drop Place 1 drop into both eyes every evening. 2.5 mL 12    nitroGLYCERIN (NITROSTAT) 0.4 MG SL tablet PLACE 1 TABLET UNDER THE TONGUE EVERY 5 (FIVE) MINUTES AS NEEDED FOR CHEST PAIN.MAX 3 DOSES IN15 MIN 20 tablet 0    spironolactone (ALDACTONE) 100 MG tablet Take 1 tablet (100 mg total) by mouth once  "daily. 90 tablet 1    traZODone (DESYREL) 50 MG tablet Take 1 tablet (50 mg total) by mouth every evening. 90 tablet 3    carvediloL (COREG) 25 MG tablet Take 1 tablet (25 mg total) by mouth 2 (two) times daily with meals. 180 tablet 1     No current facility-administered medications on file prior to visit.     Social History     Socioeconomic History    Marital status: Single   Tobacco Use    Smoking status: Current Every Day Smoker     Packs/day: 1.00     Years: 54.00     Pack years: 54.00     Types: Cigarettes     Start date: 1/1/1970    Smokeless tobacco: Never Used   Substance and Sexual Activity    Alcohol use: Yes    Drug use: Never    Sexual activity: Not Currently   Social History Narrative    Lives alone, no HH as of 03/2022. No other smokers or pets in household.     Family History   Problem Relation Age of Onset    Heart disease Mother     Ovarian cancer Mother     Diabetes Father     Heart disease Father     Brain Hemorrhage Sister     Heart disease Brother        Review of Systems   Constitutional: Positive for fatigue.   Respiratory: Positive for shortness of breath and dyspnea on extertion.    Cardiovascular: Positive for leg swelling.   Musculoskeletal: Positive for arthralgias.       Objective:      /61   Pulse 74   Resp 18   Ht 6' 3" (1.905 m)   Wt 97.8 kg (215 lb 9.8 oz)   SpO2 99%   BMI 26.95 kg/m²   Physical Exam  Vitals and nursing note reviewed.   Constitutional:       Appearance: He is well-developed.   HENT:      Head: Normocephalic and atraumatic.      Nose: Congestion present.   Eyes:      Conjunctiva/sclera: Conjunctivae normal.      Pupils: Pupils are equal, round, and reactive to light.   Neck:      Thyroid: No thyromegaly.      Vascular: No JVD.      Trachea: No tracheal deviation.   Cardiovascular:      Rate and Rhythm: Normal rate. Rhythm irregular.      Heart sounds: Normal heart sounds.   Pulmonary:      Effort: Pulmonary effort is normal.      Breath " sounds: Decreased breath sounds present.   Abdominal:      Palpations: Abdomen is soft.   Musculoskeletal:         General: Normal range of motion.      Cervical back: Neck supple.   Lymphadenopathy:      Cervical: No cervical adenopathy.   Skin:     General: Skin is warm and dry.   Neurological:      Mental Status: He is alert and oriented to person, place, and time.       Personal Diagnostic Review      Pulmonary Studies Review 8/16/2022   SpO2 99   Ordering Provider -   Interpreting Provider -   Performing nurse/tech/RT -   Diagnosis -   Height 75   Weight 3449.76   BMI (Calculated) 26.9   Predicted Distance 343.35   Patient Race -   6MWT Status -   Patient Reported -   Was O2 used? -   6MW Distance walked (feet) -   Distance walked (meters) -   Did patient stop? -   Type of assistive device(s) used? -   Is extra documentation required for this patient? -   Oxygen Saturation -   Supplemental Oxygen -   Heart Rate -   Blood Pressure -   Sumeet Dyspnea Rating  -   Oxygen Saturation -   Supplemental Oxygen -   Heart Rate -   Blood Pressure -   Sumeet Dyspnea Rating  -   Recovery Time (seconds) -   Oxygen Saturation -   Supplemental Oxygen -   Heart Rate -   Blood Pressure -   Sumeet Dyspnea Rating  -   Is procedure ready for interpretation? -   Did the patient stop or pause? -   How many times did the patient stop or pause? -   Stop Time 1 -   Restart Time 1 -   Pause Time 1 -   Total Time Walked (Calculated) -   Total Laps Walked -   Final Partial Lap Distance (feet) -   Total Distance Feet (Calculated) -   Total Distance Meters (Calculated) -   Predicted Distance Meters (Calculated) 604.54   Percentage of Predicted (Calculated) -   Peak VO2 (Calculated) -   Mets -   Has The Patient Had a Previous Six Minute Walk Test? -   Oxygen Qualification? -       X-Ray Chest AP Portable  Narrative: EXAMINATION:  XR CHEST AP PORTABLE    CLINICAL HISTORY:  weakness;    TECHNIQUE:  Single frontal view of the chest was  performed.    COMPARISON:  Chest radiograph 04/20/2022    FINDINGS:  Cardiac defibrillator overlies the upper left hemithorax with single lead terminating over the right ventricle, unchanged in position.No large consolidation, pneumothorax, or significant pleural fluid.  A small nodular opacity overlies the mid left lung, not evident on the comparison film.    The cardiac silhouette is normal in size. Bilateral hilar calcification noted, suggesting prior granulomatous disease.    No acute osseous abnormality.  Impression: As above.    Electronically signed by: Lesia Edge  Date:    06/24/2022  Time:    14:58      Office Spirometry Results:     No flowsheet data found.  Pulmonary Studies Review 8/16/2022   SpO2 99   Ordering Provider -   Interpreting Provider -   Performing nurse/tech/RT -   Diagnosis -   Height 75   Weight 3449.76   BMI (Calculated) 26.9   Predicted Distance 343.35   Patient Race -   6MWT Status -   Patient Reported -   Was O2 used? -   6MW Distance walked (feet) -   Distance walked (meters) -   Did patient stop? -   Type of assistive device(s) used? -   Is extra documentation required for this patient? -   Oxygen Saturation -   Supplemental Oxygen -   Heart Rate -   Blood Pressure -   Sumeet Dyspnea Rating  -   Oxygen Saturation -   Supplemental Oxygen -   Heart Rate -   Blood Pressure -   Sumeet Dyspnea Rating  -   Recovery Time (seconds) -   Oxygen Saturation -   Supplemental Oxygen -   Heart Rate -   Blood Pressure -   Sumeet Dyspnea Rating  -   Is procedure ready for interpretation? -   Did the patient stop or pause? -   How many times did the patient stop or pause? -   Stop Time 1 -   Restart Time 1 -   Pause Time 1 -   Total Time Walked (Calculated) -   Total Laps Walked -   Final Partial Lap Distance (feet) -   Total Distance Feet (Calculated) -   Total Distance Meters (Calculated) -   Predicted Distance Meters (Calculated) 604.54   Percentage of Predicted (Calculated) -   Peak VO2  (Calculated) -   Mets -   Has The Patient Had a Previous Six Minute Walk Test? -   Oxygen Qualification? -     Echo Complete     Ref Range & Units 1 yr ago   LVIDD cm 4.45    LVIDS cm 3.11    FS percent 30.10    IVS cm 1.29    PW cm 1.11    Echo EF Estimated percent 58.00    E/E' ratio  13.10    MV Peak A Kalia cm/sec 63.70    MV Peak E Kalia cm/sec 60.20    E/A ratio ratio 0.90    AV peak gradient mmHg 4.00    Resulting Agency  [facility]     Narrative  Performed by [facility]  This result has an attachment that is not available.   Transthoracic Echocardiographic Report     Patient Name: JACKELINE HOOPER R Patient ID: [id number] Account #:   : 1950 (70y 9m) Gender: M Study Date: 2021 10:00:26 AM   Ht(Cm): 191 Wt(Kg): 102.06 BSA: 2.33 Accession #: [id number]   Sonographer: Location: [facility] Provider: JILLIAN TOMLINSON     Heart Rate: 64 Quality: Technically difficult study due to limited acoustic windows.   Ref.Provider: JILLIAN TOMLINSON     -----------------------    CONCLUSIONS:   1. Normal left ventricular cavity size. Moderately depressed left ventricular systolic   function. LVEF 30 - 35%. Akinesis of mid-distal septum and apex.   2. Mild tricuspid valve regurgitation.     -----------------------    PROCEDURES:   Echocardiographic Report: Transthoracic complete echo, 2D, spectral and tissue Doppler,   color flow Doppler, M-mode.   Sonographer: ALEXANDREA Mix, RVT     -----------------------    INDICATIONS:   Atrial fibrillation/flutter, Chronic obstructive pulmonary disease, Hypertension,   Implantable cardiovertor/defibrillator, and Syncope.     -----------------------    FINDINGS:   Left Ventricle: Normal left ventricular cavity size. Moderately depressed left   ventricular systolic function. LVEF 30 - 35%. Mild asymmetric septal hypertrophy.   Akinesis of mid-distal septum and apex.   Right Ventricle: Normal right ventricular size. Normal right ventricular systolic   function. A  pacemaker or ICD lead is noted in the right ventricle.   Left Atrium: The left atrium is not well visualized.   Right Atrium: The right atrium is normal in size.   Mitral Valve: The mitral valve is not well visualized. No or trivial mitral valve   regurgitation. No mitral valve stenosis.   Aortic Valve: No or trivial aortic valve regurgitation. No aortic valve stenosis. Aortic   valve cusps appear mildly calcified. AV peak PG 4 mmHg   Tricuspid Valve: Mild tricuspid valve regurgitation. The estimated right ventricular   systolic pressure/PASP is <35 mmHg.   Pulmonic Valve: The pulmonic valve is not well visualized.   Pericardium: Normal pericardium without evidence of pericardial effusion.   Aorta: Normal aortic root size.   IVC: Normal IVC size with >50% collapse with inspiration. Normal estimated RAP around 0-5   mmHg.     -----------------------    MEASUREMENTS:   2D/MM Value Doppler Value   LVIDd 2D 4.45 cm AV Peak Kalia 103.00 cm/sec   LVIDs 2D 3.11 cm AV Peak PG 4.00 mmHg   LV FS Teich 2D 30.10 % LVOT Peak Kalia 79.50 cm/sec   IVSd 2D 1.29 cm LVOT Peak PG 3.00 mmHg   LVPWd 2D 1.11 cm MV E Peak Kalia 60.20 cm/sec   IVS/LVPW 2D 1.16 ratio MV A Peak Kalia 63.70 cm/sec   LV Mass 2D 197.52 g MV E/A 0.90 ratio   LV Mass Index 2D 84.77 g/m2 Med E` Kalia 4.58 cm/sec   RWT 0.50 Lat E` Kalia 5.75 cm/sec   EDV 2D 90.05 mL Med E/E` Ratio 13.14   ESV 2D 38.21 mL Lateral E/E' 10.47   EF Teich 2D 58 % Average E/E` 11.81   RVDd 2d 4.06 cm TR Peak Kalia 223.00 cm/sec   TAPSE 1.60 cm TR Peak PG 20.00 mmHg   IVC Diam 1.70 sec   AoR Diam 2D 3.50 cm     Electronically Signed By:   Gómez Marsh   2021 19:27:08 CST   CC:     CC:     Transthoracic Echocardiographic Report    Procedure Note    Gómez Marsh MD - 2021   Formatting of this note might be different from the original.   Transthoracic Echocardiographic Report     Patient Name: JACKELINE HOOPER R Patient ID: [id number] Account #:   : 1950 (70y 9m) Gender: M Study Date:  02- 10:00:26 AM   Ht(Cm): 191 Wt(Kg): 102.06 BSA: 2.33 Accession #: [id number]   Sonographer: Location: [facility] Provider: JILLIAN TOMLINSON     Heart Rate: 64 Quality: Technically difficult study due to limited acoustic windows.   Ref.Provider: JILLIAN TOMLINSON     -----------------------    CONCLUSIONS:   1. Normal left ventricular cavity size. Moderately depressed left ventricular systolic   function. LVEF 30 - 35%. Akinesis of mid-distal septum and apex.   2. Mild tricuspid valve regurgitation.     -----------------------    PROCEDURES:   Echocardiographic Report: Transthoracic complete echo, 2D, spectral and tissue Doppler,   color flow Doppler, M-mode.   Sonographer: ALEXANDREA Mix, RVT     -----------------------    INDICATIONS:   Atrial fibrillation/flutter, Chronic obstructive pulmonary disease, Hypertension,   Implantable cardiovertor/defibrillator, and Syncope.     -----------------------    FINDINGS:   Left Ventricle: Normal left ventricular cavity size. Moderately depressed left   ventricular systolic function. LVEF 30 - 35%. Mild asymmetric septal hypertrophy.   Akinesis of mid-distal septum and apex.   Right Ventricle: Normal right ventricular size. Normal right ventricular systolic   function. A pacemaker or ICD lead is noted in the right ventricle.   Left Atrium: The left atrium is not well visualized.   Right Atrium: The right atrium is normal in size.   Mitral Valve: The mitral valve is not well visualized. No or trivial mitral valve   regurgitation. No mitral valve stenosis.   Aortic Valve: No or trivial aortic valve regurgitation. No aortic valve stenosis. Aortic   valve cusps appear mildly calcified. AV peak PG 4 mmHg   Tricuspid Valve: Mild tricuspid valve regurgitation. The estimated right ventricular   systolic pressure/PASP is <35 mmHg.   Pulmonic Valve: The pulmonic valve is not well visualized.   Pericardium: Normal pericardium without evidence of pericardial effusion.    Aorta: Normal aortic root size.   IVC: Normal IVC size with >50% collapse with inspiration. Normal estimated RAP around 0-5    mmHg.     -----------------------    MEASUREMENTS:   2D/MM Value Doppler Value   LVIDd 2D 4.45 cm AV Peak Kalia 103.00 cm/sec   LVIDs 2D 3.11 cm AV Peak PG 4.00 mmHg   LV FS Teich 2D 30.10 % LVOT Peak Kalia 79.50 cm/sec   IVSd 2D 1.29 cm LVOT Peak PG 3.00 mmHg   LVPWd 2D 1.11 cm MV E Peak Kalia 60.20 cm/sec   IVS/LVPW 2D 1.16 ratio MV A Peak Kalia 63.70 cm/sec   LV Mass 2D 197.52 g MV E/A 0.90 ratio   LV Mass Index 2D 84.77 g/m2 Med E` Kalia 4.58 cm/sec   RWT 0.50 Lat E` Kalia 5.75 cm/sec   EDV 2D 90.05 mL Med E/E` Ratio 13.14   ESV 2D 38.21 mL Lateral E/E' 10.47   EF Teich 2D 58 % Average E/E` 11.81   RVDd 2d 4.06 cm TR Peak Kalia 223.00 cm/sec   TAPSE 1.60 cm TR Peak PG 20.00 mmHg   IVC Diam 1.70 sec   AoR Diam 2D 3.50 cm     Electronically Signed By:   Gómez Marsh   2021-02-09 19:27:08 CST   CC:     CC:     Transthoracic Echocardiographic Report  Specimen Collected: 02/09/21 10:00 AM Last Resulted: 02/09/21  7:27 PM   Received From: [facility]  Result Received: 01/21/22  3:13 PM              CT Angiogram Chest    Anatomical Region Laterality Modality   Chest -- Computed Tomography       Impression      1.  Positive scan for pulmonary embolism involving subsegmental branches of both lower lobe pulmonary arteries, as discussed above. There is no saddle embolus.     2.  Mild cardiomegaly with prominent coronary artery calcification and with evidence of myocardial calcification along the left ventricle, suggestive of a previous myocardial infarction. A transvenous, unipolar cardiac defibrillator is in place.     3.  Mild dilatation of the main pulmonary artery outflow tract, suggestive of mild pulmonary arterial hypertension.     4.  Small left pleural effusion with probable atelectasis and/or parenchymal scarring in both  posterior costophrenic sulci, left side greater than right.     5.  Focal minimal aneurysmal dilatation of the distal aortic arch/proximal descending thoracic aorta, as discussed above, measuring 3.6 cm in diameter.     6.  Mild, diffuse, fatty infiltration of the liver.    Narrative    This result has an attachment that is not available.   CTA Chest w/wo IV Contrast     CLINICAL INDICATION: chest pain and shortness of breath     COMPARISON: Two-view chest radiograph and left rib series obtained on 2/25/2020; 2/11/2011 CT angiogram of the pulmonary arteries     TECHNIQUE: Thin section spiral CT acquisition of the pulmonary arteries was obtained in the arterial phase of contrast administration. Multiplanar MIP reconstructions and 3-D reconstructions were also performed. Automated exposure control was used for dose reduction.     FINDINGS: There are intraluminal filling defects in the left lower lobe pulmonary arterial subsegmental branches, particularly those supplying the medial aspect of the left lung base. A filling defect is also seen within a subsegmental branch of the lateral basal segment of the right lower lobe pulmonary artery. Each of these abnormalities is evident on image 74 of coronal series 5.     The ascending aorta is normal in size. There is focal mild aneurysmal dilatation of the distal portion of the aortic arch as it becomes the descending thoracic aorta, measuring up to 3.6 cm in diameter. There is moderate calcific atherosclerotic plaque in the aortic arch, which is partially uncoiled.     Small blebs and/or cysts are seen in the subpleural region of the right pulmonary apex and posteriorly at the left pulmonary apex. There is some groundglass opacity in the left posterior costophrenic sulcus with a small associated left pleural effusion, with only minor groundglass opacity in the right posterior costophrenic sulcus, which could be related to atelectasis.     There is a nonenlarged, 1.3 x 1.0 cm  right hilar lymph node. No definite mediastinal or hilar adenopathy is demonstrated.     Included portions of the thyroid gland are normal.     There is mild cardiomegaly with prominent coronary artery calcification but with no pericardial effusion. The main pulmonary artery outflow tract is mildly dilated, measuring 3.0 cm in diameter, suggestive of some underlying pulmonary arterial hypertension.     In the included portions of the upper abdomen, there is diffuse diminished density in the liver, probably related to fatty infiltration. Some ill-defined increased density dependently in the gallbladder could be related to dense bile or to sludge. The spleen, pancreas, adrenal glands, and visualized upper kidneys are normal.     There is a sinuous, rotatory spinal scoliosis, convexity to the left at the cervicothoracic junction and convexity to the right in the lower thoracic spine.    Procedure Note    Herminio Leon MD - 02/26/2020   Formatting of this note might be different from the original.   CTA Chest w/wo IV Contrast     CLINICAL INDICATION: chest pain and shortness of breath     COMPARISON: Two-view chest radiograph and left rib series obtained on 2/25/2020; 2/11/2011 CT angiogram of the pulmonary arteries     TECHNIQUE: Thin section spiral CT acquisition of the pulmonary arteries was obtained in the arterial phase of contrast administration. Multiplanar MIP reconstructions and 3-D reconstructions were also performed. Automated exposure control was used for dose reduction.     FINDINGS: There are intraluminal filling defects in the left lower lobe pulmonary arterial subsegmental branches, particularly those supplying the medial aspect of the left lung base. A filling defect is also seen within a subsegmental branch of the lateral basal segment of the right lower lobe pulmonary artery. Each of these abnormalities is evident on image 74 of coronal series 5.     The ascending aorta is normal in size.  There is focal mild aneurysmal dilatation of the distal portion of the aortic arch as it becomes the descending thoracic aorta, measuring up to 3.6 cm in diameter. There is moderate calcific atherosclerotic plaque in the aortic arch, which is partially uncoiled.     Small blebs and/or cysts are seen in the subpleural region of the right pulmonary apex and posteriorly at the left pulmonary apex. There is some groundglass opacity in the left posterior costophrenic sulcus with a small associated left pleural effusion, with only minor groundglass opacity in the right posterior costophrenic sulcus, which could be related to atelectasis.     There is a nonenlarged, 1.3 x 1.0 cm right hilar lymph node. No definite mediastinal or hilar adenopathy is demonstrated.     Included portions of the thyroid gland are normal.     There is mild cardiomegaly with prominent coronary artery calcification but with no pericardial effusion. The main pulmonary artery outflow tract is mildly dilated, measuring 3.0 cm in diameter, suggestive of some underlying pulmonary arterial hypertension.     In the included portions of the upper abdomen, there is diffuse diminished density in the liver, probably related to fatty infiltration. Some ill-defined increased density dependently in the gallbladder could be related to dense bile or to sludge. The spleen, pancreas, adrenal glands, and visualized upper kidneys are normal.     There is a sinuous, rotatory spinal scoliosis, convexity to the left at the cervicothoracic junction and convexity to the right in the lower thoracic spine.     IMPRESSION:     1.  Positive scan for pulmonary embolism involving subsegmental branches of both lower lobe pulmonary arteries, as discussed above. There is no saddle embolus.     2.  Mild cardiomegaly with prominent coronary artery calcification and with evidence of myocardial calcification along the left ventricle, suggestive of a previous myocardial infarction. A  transvenous, unipolar cardiac defibrillator is in place.     3.  Mild dilatation of the main pulmonary artery outflow tract, suggestive of mild pulmonary arterial hypertension.     4.  Small left pleural effusion with probable atelectasis and/or parenchymal scarring in both posterior costophrenic sulci, left side greater than right.     5.  Focal minimal aneurysmal dilatation of the distal aortic arch/proximal descending thoracic aorta, as discussed above, measuring 3.6 cm in diameter.     6.  Mild, diffuse, fatty infiltration of the liver.  Exam End: 02/26/20  4:21 AM Last Resulted: 02/26/20  4:50 AM   Received From: [facility]sidi[facility] and Affiliates  Result Received: 03/03/22  9:30 AM   View Encounter             Assessment:       Multiple subsegmental pulmonary emboli without acute cor pulmonale  Continue ELIQUIS    Pre-operative respiratory examination  Clearance for surgery:  The patient is at an increased risk of complications from surgery due to multiple medical problems including COPD. Vaccination status reviewed and updated. Risks of possible complications of surgery discussed in detail including risk of respiratory failure requiring mechanical ventilation, post operative pneumonia, deep venous thrombosis, pulmonary embolism and death.  Pulmonary function studies, arterial blood gases and chest X ray reports are independently reviewed. Methods of improving lung function prior to surgery including incentive spirometry, regular use of bronchodilators, exercise and respiratory toilet discussed. Patient voices understanding of increased risks involved due to compromised pulmonary status and the need to comply with treatment plan prior to surgery.  The patient is cleared for anesthesia and surgery from a pulmonary standpoint.      Mixed simple and mucopurulent chronic bronchitis  -     albuterol (PROVENTIL) 2.5 mg /3 mL (0.083 %) nebulizer solution; Take 3 mLs  (2.5 mg total) by nebulization every 4 to 6 hours as needed for Wheezing or Shortness of Breath.  Dispense: 360 mL; Refill: 11  -     NEBULIZER KIT (SUPPLIES) FOR HOME USE  -     NEBULIZER FOR HOME USE  -     methylPREDNISolone (MEDROL DOSEPACK) 4 mg tablet; use as directed  Dispense: 1 each; Refill: 0  -     azithromycin (ZITHROMAX Z-COLT) 250 MG tablet; 500 mg on day 1 (two tablets) followed by 250 mg once daily on days 2-5  Dispense: 6 tablet; Refill: 0  -     PULSE OXIMETRY OVERNIGHT; Future    Multiple subsegmental pulmonary emboli without acute cor pulmonale  -     PULSE OXIMETRY OVERNIGHT; Future    Nocturnal hypoxemia  -     PULSE OXIMETRY OVERNIGHT; Future    Nicotine dependence with current use  -     CT Chest Lung Screening Low Dose; Future; Expected date: 08/16/2022    Cigarette smoker    Nicotine dependence, cigarettes, uncomplicated  -     CT Chest Lung Screening Low Dose; Future; Expected date: 08/16/2022    Pre-operative respiratory examination          Outpatient Encounter Medications as of 8/16/2022   Medication Sig Dispense Refill    albuterol (PROVENTIL/VENTOLIN HFA) 90 mcg/actuation inhaler Inhale 2 puffs into the lungs every 4 (four) hours as needed for Wheezing or Shortness of Breath. 18 g 11    apixaban (ELIQUIS) 5 mg Tab Take 1 tablet (5 mg total) by mouth 2 (two) times daily. 180 tablet 3    aspirin (ECOTRIN) 81 MG EC tablet Take 81 mg by mouth.      atorvastatin (LIPITOR) 80 MG tablet Take 1 tablet (80 mg total) by mouth once daily. 90 tablet 1    bimatoprost (LUMIGAN) 0.01 % Drop Place 1 drop into both eyes every evening. 2.5 mL 11    brimonidine 0.2% (ALPHAGAN) 0.2 % Drop Place 1 drop into both eyes every 12 (twelve) hours. 10 mL 12    furosemide (LASIX) 40 MG tablet Take 1 tablet (40 mg total) by mouth daily as needed (swelling). 90 tablet 0    lisinopriL (PRINIVIL,ZESTRIL) 40 MG tablet Take 1 tablet (40 mg total) by mouth once daily. 90 tablet 1    netarsudiL-latanoprost  (ROCKLATAN) 0.02-0.005 % Drop Place 1 drop into both eyes every evening. 2.5 mL 12    nitroGLYCERIN (NITROSTAT) 0.4 MG SL tablet PLACE 1 TABLET UNDER THE TONGUE EVERY 5 (FIVE) MINUTES AS NEEDED FOR CHEST PAIN.MAX 3 DOSES IN15 MIN 20 tablet 0    spironolactone (ALDACTONE) 100 MG tablet Take 1 tablet (100 mg total) by mouth once daily. 90 tablet 1    traZODone (DESYREL) 50 MG tablet Take 1 tablet (50 mg total) by mouth every evening. 90 tablet 3    albuterol (PROVENTIL) 2.5 mg /3 mL (0.083 %) nebulizer solution Take 3 mLs (2.5 mg total) by nebulization every 4 to 6 hours as needed for Wheezing or Shortness of Breath. 360 mL 11    azithromycin (ZITHROMAX Z-COLT) 250 MG tablet 500 mg on day 1 (two tablets) followed by 250 mg once daily on days 2-5 6 tablet 0    carvediloL (COREG) 25 MG tablet Take 1 tablet (25 mg total) by mouth 2 (two) times daily with meals. 180 tablet 1    methylPREDNISolone (MEDROL DOSEPACK) 4 mg tablet use as directed 1 each 0     No facility-administered encounter medications on file as of 2022.     Plan:       Requested Prescriptions     Signed Prescriptions Disp Refills    albuterol (PROVENTIL) 2.5 mg /3 mL (0.083 %) nebulizer solution 360 mL 11     Sig: Take 3 mLs (2.5 mg total) by nebulization every 4 to 6 hours as needed for Wheezing or Shortness of Breath.    methylPREDNISolone (MEDROL DOSEPACK) 4 mg tablet 1 each 0     Sig: use as directed    azithromycin (ZITHROMAX Z-COLT) 250 MG tablet 6 tablet 0     Si mg on day 1 (two tablets) followed by 250 mg once daily on days 2-5     Problem List Items Addressed This Visit     Mixed simple and mucopurulent chronic bronchitis - Primary    Relevant Medications    albuterol (PROVENTIL) 2.5 mg /3 mL (0.083 %) nebulizer solution    methylPREDNISolone (MEDROL DOSEPACK) 4 mg tablet    azithromycin (ZITHROMAX Z-COLT) 250 MG tablet    Other Relevant Orders    NEBULIZER KIT (SUPPLIES) FOR HOME USE    NEBULIZER FOR HOME USE    PULSE OXIMETRY  OVERNIGHT    Multiple subsegmental pulmonary emboli without acute cor pulmonale    Overview     Last Assessment & Plan:   Formatting of this note might be different from the original.  History & Physical US leg negative for DVT.  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  Patient is medically stable (no need for supplemental oxygen, not using accessory muscles, troponin not elevated) so will simply re-start his eliquis.  SOB likely from PE but also has wheezing so will treat for COPD exacerbation.  Patient has been out of medication for 2 weeks so will resume his eliquis tonight (received 100 mg lovenox this morning).  Consult medical management to help with medications.    Discharge Summary Ultrasound negative for DVT.  CTA did show left lower and right lower lobe pulmonary embolism.  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  Patient will go home with Eliquis 10 mg twice a day to complete a 7-day course and then start 5 mg twice a day.  He also had some COPD exacerbation which pretty much has resolved.    Follow-up Continue eliquis           Current Assessment & Plan     Continue ELIQUIS           Relevant Orders    PULSE OXIMETRY OVERNIGHT    Pre-operative respiratory examination    Current Assessment & Plan     Clearance for surgery:  The patient is at an increased risk of complications from surgery due to multiple medical problems including COPD. Vaccination status reviewed and updated. Risks of possible complications of surgery discussed in detail including risk of respiratory failure requiring mechanical ventilation, post operative pneumonia, deep venous thrombosis, pulmonary embolism and death.  Pulmonary function studies, arterial blood gases and chest X ray reports are independently reviewed. Methods of improving lung function prior to surgery including incentive spirometry, regular use of bronchodilators, exercise and respiratory toilet  discussed. Patient voices understanding of increased risks involved due to compromised pulmonary status and the need to comply with treatment plan prior to surgery.  The patient is cleared for anesthesia and surgery from a pulmonary standpoint.               Other Visit Diagnoses     Nocturnal hypoxemia        Relevant Orders    PULSE OXIMETRY OVERNIGHT    Nicotine dependence with current use        Relevant Orders    CT Chest Lung Screening Low Dose    Cigarette smoker        Nicotine dependence, cigarettes, uncomplicated        Relevant Orders    CT Chest Lung Screening Low Dose             Follow up in about 6 months (around 2/16/2023) for Overnight O2 Sat ASAP, Review progress, Review CT/PET - on return visit.    MEDICAL DECISION MAKING: Moderate to high complexity.  Overall, the multiple problems listed are of moderate to high severity that may impact quality of life and activities of daily living. Side effects of medications, treatment plan as well as options and alternatives reviewed and discussed with patient. There was counseling of patient concerning these issues.    Total time spent in counseling and coordination of care - 40  minutes of total time spent on the encounter, which includes face to face time and non-face to face time preparing to see the patient (eg, review of tests), Obtaining and/or reviewing separately obtained history, Documenting clinical information in the electronic or other health record, Independently interpreting results (not separately reported) and communicating results to the patient/family/caregiver, or Care coordination (not separately reported).    Time was used in discussion of prognosis, risks, benefits of treatment, instructions and compliance with regimen . Discussion with other physicians and/or health care providers - home health or for use of durable medical equipment (oxygen, nebulizers, CPAP, BiPAP) occurred.

## 2022-08-17 ENCOUNTER — CLINICAL SUPPORT (OUTPATIENT)
Dept: PULMONOLOGY | Facility: CLINIC | Age: 72
End: 2022-08-17
Payer: MEDICARE

## 2022-08-17 DIAGNOSIS — J41.8 MIXED SIMPLE AND MUCOPURULENT CHRONIC BRONCHITIS: ICD-10-CM

## 2022-08-17 DIAGNOSIS — G47.34 NOCTURNAL HYPOXEMIA: ICD-10-CM

## 2022-08-17 DIAGNOSIS — I26.94 MULTIPLE SUBSEGMENTAL PULMONARY EMBOLI WITHOUT ACUTE COR PULMONALE: ICD-10-CM

## 2022-08-17 PROCEDURE — 94762 PR NONINVASV OXYGEN SATUR, CONT: ICD-10-PCS | Mod: S$GLB,,, | Performed by: INTERNAL MEDICINE

## 2022-08-17 PROCEDURE — 94762 N-INVAS EAR/PLS OXIMTRY CONT: CPT | Mod: S$GLB,,, | Performed by: INTERNAL MEDICINE

## 2022-08-18 ENCOUNTER — TELEPHONE (OUTPATIENT)
Dept: PULMONOLOGY | Facility: CLINIC | Age: 72
End: 2022-08-18
Payer: MEDICARE

## 2022-08-18 NOTE — PROCEDURES
[facility]  [address] [address]. * [place] [address]  Telephone: [phone]  Test date: 22 Start: 22 00:35:08 Raghavendra Luz  Doctor: Alexi Regalado End: 22 10:27:56 [id number]  Oximetry: Summary Report  Comments: Room air  Recording time: 09:52:48 Highest pulse: 87 Highest SpO2: 98%  Excluded samplin:00:28 Lowest pulse: 49 Lowest SpO2: 89%  Total valid samplin:52:20 Mean pulse: 66 Mean SpO2: 93.6%  1 S.D.: 4.8 1 S.D.: 1.5  Time with SpO2<90: 0:02:48, 0.5%  Time with SpO2<80: 0:00:00, 0.0%  Time with SpO2<70: 0:00:00, 0.0%  Time with SpO2<60: 0:00:00, 0.0%  Time with SpO2<88: 0:00:00, 0.0%  Time with SpO2 =>90: 9:49:32, 99.5%  Time with SpO2=>80 & <90: 0:02:48, 0.5%  Time with SpO2=>70 & <80: 0:00:00, 0.0%  Time with SpO2=>60 & <70: 0:00:00, 0.0%  The longest continuous time with saturation <=89 was 00:00:56, which started at  22 01:37:00.  A desaturation event was defined as a decrease of saturation by 4 or more.  No events were excluded due to artifact.  There were 17 desaturation events over 3 minutes duration.  There were 24 desaturation events of less than 3 minutes duration during which:  The mean high was 96.4%. The mean low was 91.7%.  The number of these events that were:  > 0 & <10 seconds: 0 > 0 seconds: 24  =>10 & <20 seconds: 0 =>10 seconds: 24  =>20 & <30 seconds: 9 =>20 seconds: 24  =>30 & <40 seconds: 0 =>30 seconds: 15  =>40 & <50 seconds: 2 =>40 seconds: 15  =>50 & <60 seconds: 2 =>50 seconds: 13  =>60 seconds: 11 =>60 seconds: 11  The mean length of desaturation events that were >=10 sec & <=3 mins was: 68.8 sec.  Desaturation event index (events >=10 sec per sampled hour): 2.4  Desaturation event index (events >= 0 sec per sampled hour): 2.4    Overnight Oxygen Saturation Study:    INTERPRETATION:  Overnight O2 saturation study reviewed.  Conditions of testing: Noted above in report.    Normal overnight oxygenation  Time with SpO2<88: 0:00:00,  0.0% of the study period. Lowest oxygen saturation recorded was 89%. Clinical correlation suggested.      Alexi Regalado MD  Pulmonary / Critical Care Medicine    Medicare Criteria Comments:   Oximetry test results suggest the patient does not fall under Medicare Group 1 Criteria for oxygen prescription.   (Arterial oxygen saturation at or below 88% for at least 5 minutes taken during sleep)    Details about Medicare Group Criteria coverage can be found at http://www.cms.hhs.gov/manuals/downloads/

## 2022-09-06 ENCOUNTER — TELEPHONE (OUTPATIENT)
Dept: PREADMISSION TESTING | Facility: HOSPITAL | Age: 72
End: 2022-09-06
Payer: MEDICARE

## 2022-09-06 NOTE — TELEPHONE ENCOUNTER
----- Message from Marquise Littlejohn LPN sent at 9/6/2022  9:39 AM CDT -----    ----- Message -----  From: Yaneth Walker  Sent: 9/6/2022   8:42 AM CDT  To: Hayden CHRISTINA Staff    .Type:  Pharmacy Calling to Clarify an RX    Name of Caller:[place]   Pharmacy Name:[facility]  Prescription Name:Colonoscopy kit   What do they need to clarify?:No script for kit has been submitted   Best Call Back Number:[phone]   Additional Information:     Thanks bsc

## 2022-09-07 ENCOUNTER — ANESTHESIA (OUTPATIENT)
Dept: ENDOSCOPY | Facility: HOSPITAL | Age: 72
End: 2022-09-07
Payer: MEDICARE

## 2022-09-07 ENCOUNTER — HOSPITAL ENCOUNTER (OUTPATIENT)
Facility: HOSPITAL | Age: 72
Discharge: HOME | End: 2022-09-07
Attending: INTERNAL MEDICINE | Admitting: INTERNAL MEDICINE
Payer: MEDICARE

## 2022-09-07 ENCOUNTER — ANESTHESIA EVENT (OUTPATIENT)
Dept: ENDOSCOPY | Facility: HOSPITAL | Age: 72
End: 2022-09-07
Payer: MEDICARE

## 2022-09-07 DIAGNOSIS — K63.3 ULCER OF INTESTINE: Primary | ICD-10-CM

## 2022-09-07 DIAGNOSIS — R89.7 ABNORMALITY PRESENT ON GROSS PATHOLOGY: ICD-10-CM

## 2022-09-07 PROCEDURE — 45385 COLONOSCOPY W/LESION REMOVAL: CPT | Mod: ,,, | Performed by: INTERNAL MEDICINE

## 2022-09-07 PROCEDURE — 45385 PR COLONOSCOPY,REMV LESN,SNARE: ICD-10-PCS | Mod: ,,, | Performed by: INTERNAL MEDICINE

## 2022-09-07 PROCEDURE — 27201089 HC SNARE, DISP (ANY): Performed by: INTERNAL MEDICINE

## 2022-09-07 PROCEDURE — 45385 COLONOSCOPY W/LESION REMOVAL: CPT | Performed by: INTERNAL MEDICINE

## 2022-09-07 PROCEDURE — 25000003 PHARM REV CODE 250: Performed by: NURSE ANESTHETIST, CERTIFIED REGISTERED

## 2022-09-07 PROCEDURE — 37000009 HC ANESTHESIA EA ADD 15 MINS: Performed by: INTERNAL MEDICINE

## 2022-09-07 PROCEDURE — 88305 TISSUE EXAM BY PATHOLOGIST: CPT | Performed by: PATHOLOGY

## 2022-09-07 PROCEDURE — 88305 TISSUE EXAM BY PATHOLOGIST: ICD-10-PCS | Mod: 26,,, | Performed by: PATHOLOGY

## 2022-09-07 PROCEDURE — 88305 TISSUE EXAM BY PATHOLOGIST: CPT | Mod: 26,,, | Performed by: PATHOLOGY

## 2022-09-07 PROCEDURE — 63600175 PHARM REV CODE 636 W HCPCS: Performed by: NURSE ANESTHETIST, CERTIFIED REGISTERED

## 2022-09-07 PROCEDURE — 37000008 HC ANESTHESIA 1ST 15 MINUTES: Performed by: INTERNAL MEDICINE

## 2022-09-07 RX ORDER — PROPOFOL 10 MG/ML
VIAL (ML) INTRAVENOUS
Status: DISCONTINUED | OUTPATIENT
Start: 2022-09-07 | End: 2022-09-07

## 2022-09-07 RX ORDER — SODIUM CHLORIDE, SODIUM LACTATE, POTASSIUM CHLORIDE, CALCIUM CHLORIDE 600; 310; 30; 20 MG/100ML; MG/100ML; MG/100ML; MG/100ML
INJECTION, SOLUTION INTRAVENOUS CONTINUOUS
Status: DISCONTINUED | OUTPATIENT
Start: 2022-09-07 | End: 2022-09-07 | Stop reason: HOSPADM

## 2022-09-07 RX ORDER — LIDOCAINE HYDROCHLORIDE 10 MG/ML
INJECTION, SOLUTION EPIDURAL; INFILTRATION; INTRACAUDAL; PERINEURAL
Status: DISCONTINUED | OUTPATIENT
Start: 2022-09-07 | End: 2022-09-07

## 2022-09-07 RX ORDER — SODIUM CHLORIDE, SODIUM LACTATE, POTASSIUM CHLORIDE, CALCIUM CHLORIDE 600; 310; 30; 20 MG/100ML; MG/100ML; MG/100ML; MG/100ML
INJECTION, SOLUTION INTRAVENOUS CONTINUOUS PRN
Status: DISCONTINUED | OUTPATIENT
Start: 2022-09-07 | End: 2022-09-07

## 2022-09-07 RX ADMIN — PROPOFOL 40 MG: 10 INJECTION, EMULSION INTRAVENOUS at 08:09

## 2022-09-07 RX ADMIN — PROPOFOL 80 MG: 10 INJECTION, EMULSION INTRAVENOUS at 08:09

## 2022-09-07 RX ADMIN — SODIUM CHLORIDE, SODIUM LACTATE, POTASSIUM CHLORIDE, AND CALCIUM CHLORIDE: 600; 310; 30; 20 INJECTION, SOLUTION INTRAVENOUS at 08:09

## 2022-09-07 RX ADMIN — LIDOCAINE HYDROCHLORIDE 20 MG: 10 INJECTION, SOLUTION EPIDURAL; INFILTRATION; INTRACAUDAL; PERINEURAL at 08:09

## 2022-09-07 NOTE — TRANSFER OF CARE
"Anesthesia Transfer of Care Note    Patient: Raghavendra Luz    Procedure(s) Performed: Procedure(s) (LRB):  COLONOSCOPY (N/A)    Patient location: GI    Anesthesia Type: MAC    Transport from OR: Transported from OR on room air with adequate spontaneous ventilation    Post pain: adequate analgesia    Post assessment: no apparent anesthetic complications and tolerated procedure well    Post vital signs: stable    Level of consciousness: awake, alert and oriented    Nausea/Vomiting: no nausea/vomiting    Complications: none    Transfer of care protocol was followed      Last vitals:   Visit Vitals  /83   Pulse 77   Temp 36.6 °C (97.9 °F)   Resp 18   Ht 6' 3" (1.905 m)   Wt 102.1 kg (225 lb)   SpO2 97%   BMI 28.12 kg/m²     "

## 2022-09-07 NOTE — ANESTHESIA RELEASE NOTE
"Anesthesia Release from PACU Note    Patient: Raghavendra Luz    Procedure(s) Performed: Procedure(s) (LRB):  COLONOSCOPY (N/A)    Anesthesia type: MAC    Post pain: Adequate analgesia    Post assessment: no apparent anesthetic complications and tolerated procedure well    Last Vitals:   Visit Vitals  /70 (BP Location: Left arm, Patient Position: Lying)   Pulse 73   Temp 36.6 °C (97.9 °F)   Resp 18   Ht 6' 3" (1.905 m)   Wt 102.1 kg (225 lb)   SpO2 100%   BMI 28.12 kg/m²       Post vital signs: stable    Level of consciousness: awake, alert  and oriented    Nausea/Vomiting: no nausea/no vomiting    Complications: none    Airway Patency: patent    Respiratory: unassisted, spontaneous ventilation, room air    Cardiovascular: stable and blood pressure at baseline    Hydration: euvolemic  "

## 2022-09-07 NOTE — ANESTHESIA POSTPROCEDURE EVALUATION
Anesthesia Post Evaluation    Patient: Raghavendra Luz    Procedure(s) Performed: Procedure(s) (LRB):  COLONOSCOPY (N/A)    Final Anesthesia Type: MAC      Patient location during evaluation: GI PACU  Patient participation: Yes- Able to Participate  Level of consciousness: awake and alert and oriented  Post-procedure vital signs: reviewed and stable  Pain management: adequate  Airway patency: patent  ALMA mitigation strategies: Multimodal analgesia  PONV status at discharge: No PONV  Anesthetic complications: no      Cardiovascular status: hemodynamically stable  Respiratory status: unassisted, spontaneous ventilation and room air  Hydration status: euvolemic  Follow-up not needed.          Vitals Value Taken Time   /70 09/07/22 0835   Temp  09/07/22 0843   Pulse 73 09/07/22 0835   Resp 18 09/07/22 0835   SpO2 100 % 09/07/22 0835         No case tracking events are documented in the log.      Pain/Anna Score: Anna Score: 10 (9/7/2022  8:30 AM)

## 2022-09-07 NOTE — PROVATION PATIENT INSTRUCTIONS
Discharge Summary/Instructions after an Endoscopic Procedure  Patient Name: Raghavendra Luz  Patient MRN: [id number]  Patient YOB: 1950 Wednesday, September 7, 2022 Sandra Blake MD  Dear patient,  As a result of recent federal legislation (The Federal Cures Act), you may   receive lab or pathology results from your procedure in your [facility]   account before your physician is able to contact you. Your physician or   their representative will relay the results to you with their   recommendations at their soonest availability.  Thank you,  RESTRICTIONS:  During your procedure today, you received medications for sedation.  These   medications may affect your judgment, balance and coordination.  Therefore,   for 24 hours, you have the following restrictions:   - DO NOT drive a car, operate machinery, make legal/financial decisions,   sign important papers or drink alcohol.    ACTIVITY:  Today: no heavy lifting, straining or running due to procedural   sedation/anesthesia.  The following day: return to full activity including work.  DIET:  Eat and drink normally unless instructed otherwise.     TREATMENT FOR COMMON SIDE EFFECTS:  - Mild abdominal pain, nausea, belching, bloating or excessive gas:  rest,   eat lightly and use a heating pad.  - Sore Throat: treat with throat lozenges and/or gargle with warm salt   water.  - Because air was used during the procedure, expelling large amounts of air   from your rectum or belching is normal.  - If a bowel prep was taken, you may not have a bowel movement for 1-3 days.    This is normal.  SYMPTOMS TO WATCH FOR AND REPORT TO YOUR PHYSICIAN:  1. Abdominal pain or bloating, other than gas cramps.  2. Chest pain.  3. Back pain.  4. Signs of infection such as: chills or fever occurring within 24 hours   after the procedure.  5. Rectal bleeding, which would show as bright red, maroon, or black stools.   (A tablespoon of blood from the rectum is not serious,  especially if   hemorrhoids are present.)  6. Vomiting.  7. Weakness or dizziness.  GO DIRECTLY TO THE NEAREST EMERGENCY ROOM IF YOU HAVE ANY OF THE FOLLOWING:      Difficulty breathing              Chills and/or fever over 101 F   Persistent vomiting and/or vomiting blood   Severe abdominal pain   Severe chest pain   Black, tarry stools   Bleeding- more than one tablespoon   Any other symptom or condition that you feel may need urgent attention  Your doctor recommends these additional instructions:  If any biopsies were taken, your doctors clinic will contact you in 1 to 2   weeks with any results.  - Discharge patient to home.   - Resume previous diet.   - Continue present medications.   - Await pathology results.   - Repeat colonoscopy in 3 years for surveillance.   - Return to referring physician.   - Patient has a contact number available for emergencies.  The signs and   symptoms of potential delayed complications were discussed with the   patient.  Return to normal activities tomorrow.  Written discharge   instructions were provided to the patient.  For questions, problems or results please call your physician Sandra Blake MD at Work:  [phone]  If you have any questions about the above instructions, call the GI   department at [phone] or call the endoscopy unit at [phone]   from 7am until 3 pm.  [facility], EMERGENCY ROOM PHONE NUMBER:   [phone]  IF A COMPLICATION OR EMERGENCY SITUATION ARISES AND YOU ARE UNABLE TO REACH   YOUR PHYSICIAN - GO DIRECTLY TO THE EMERGENCY ROOM.  I have read or have had read to me these discharge instructions for my   procedure and have received a written copy.  I understand these   instructions and will follow-up with my physician if I have any questions.     __________________________________       _____________________________________  Nurse Signature                                          Patient/Designated    Responsible Party Signature  Sandra Blake MD  9/7/2022 8:28:04 AM  This report has been verified and signed electronically.  Dear patient,  As a result of recent federal legislation (The Federal Cures Act), you may   receive lab or pathology results from your procedure in your [facility]   account before your physician is able to contact you. Your physician or   their representative will relay the results to you with their   recommendations at their soonest availability.  Thank you,  PROVATION

## 2022-09-07 NOTE — ANESTHESIA PREPROCEDURE EVALUATION
09/07/2022  Raghavendra Luz is a 72 y.o., male.      Pre-op Assessment    I have reviewed the Patient Summary Reports.     I have reviewed the Nursing Notes. I have reviewed the NPO Status.   I have reviewed the Medications.     Review of Systems  Anesthesia Hx:  No problems with previous Anesthesia    Social:  Smoker, Alcohol Use 3/4 pack per day   Hematology/Oncology:     Oncology Normal    -- Anemia:   EENT/Dental:EENT/Dental Normal   Cardiovascular:   Pacemaker Hypertension, well controlled CAD  CABG/stent  CHF hyperlipidemia ECG has been reviewed. Vent. Rate : 067 BPM     Atrial Rate : 067 BPM      P-R Int : 174 ms          QRS Dur : 136 ms       QT Int : 400 ms       P-R-T Axes : 068 -73 -71 degrees      QTc Int : 422 ms     Normal sinus rhythm   Right bundle branch block   Left anterior fascicular block    Bifascicular block   Inferior infarct ,age undetermined   Anterolateral infarct ,age undetermined   Abnormal ECG   No previous ECGs available   Confirmed by JAMI CLARK MD ([id number]) on 6/25/2022 1:26:14 PM   Pulmonary:   COPD Asthma    Renal/:  Renal/ Normal     Hepatic/GI:   Bowel Prep. PUD, GERD, poorly controlled    Musculoskeletal:  Spine Disorders: lumbar Chronic Pain    Neurological:  Neurology Normal    Endocrine:  Endocrine Normal  Obesity / BMI > 30  Dermatological:  Skin Normal    Psych:   depression          Physical Exam  General: Well nourished, Cooperative, Alert and Oriented    Airway:  Mallampati: II   Mouth Opening: Normal  TM Distance: Normal  Tongue: Normal  Neck ROM: Normal ROM    Dental:  Intact, Periodontal disease        Anesthesia Plan  Type of Anesthesia, risks & benefits discussed:    Anesthesia Type: MAC  Intra-op Monitoring Plan: Standard ASA Monitors  Post Op Pain Control Plan: multimodal analgesia  Informed Consent: Informed consent signed with the Patient and all  parties understand the risks and agree with anesthesia plan.  All questions answered.   ASA Score: 3  Day of Surgery Review of History & Physical: H&P Update referred to the surgeon/provider.    Ready For Surgery From Anesthesia Perspective.     .

## 2022-09-07 NOTE — H&P
PRE PROCEDURE H&P    Patient Name: Raghavendra Luz  MRN: [id number]  : 1950  Date of Procedure:  2022  Referring Physician: Sandra Blake MD  Primary Physician: PORSHA Dobson Jr, MD  Procedure Physician: Sandra Blake MD       Planned Procedure: Colonoscopy  Diagnosis: Abnormal path   Chief Complaint: Same as above    HPI: Patient is an 72 y.o. male is here for the above.     Last colonoscopy: 2022  Family history: None   Anticoagulation: Held Eliquis for 3 days     Past Medical History:   Past Medical History:   Diagnosis Date    Asthma     Cataract     CHF (congestive heart failure)     COPD (chronic obstructive pulmonary disease)     GERD (gastroesophageal reflux disease)     Glaucoma     Hypertension         Past Surgical History:  Past Surgical History:   Procedure Laterality Date    ANKLE FRACTURE SURGERY Left     COLONOSCOPY      COLONOSCOPY N/A 2022    Procedure: COLONOSCOPY;  Surgeon: Sandra Blake MD;  Location: [facility];  Service: Gastroenterology;  Laterality: N/A;    CORONARY ANGIOPLASTY WITH STENT PLACEMENT          Home Medications:  Prior to Admission medications    Medication Sig Start Date End Date Taking? Authorizing Provider   albuterol (PROVENTIL) 2.5 mg /3 mL (0.083 %) nebulizer solution Take 3 mLs (2.5 mg total) by nebulization every 4 to 6 hours as needed for Wheezing or Shortness of Breath. 22 Yes Alexi Regalado MD   albuterol (PROVENTIL/VENTOLIN HFA) 90 mcg/actuation inhaler Inhale 2 puffs into the lungs every 4 (four) hours as needed for Wheezing or Shortness of Breath. 22  Yes Alexi Regalado MD   apixaban (ELIQUIS) 5 mg Tab Take 1 tablet (5 mg total) by mouth 2 (two) times daily. 22 Yes Alexi Regalado MD   aspirin (ECOTRIN) 81 MG EC tablet Take 81 mg by mouth. 20  Yes Historical Provider   atorvastatin (LIPITOR) 80 MG tablet Take 1 tablet by mouth once daily 22  Yes ALISHA Dobson Jr., MD   bimatoprost  (LUMIGAN) 0.01 % Drop Place 1 drop into both eyes every evening. 7/14/22 7/14/23 Yes Gelacio Temple MD   brimonidine 0.2% (ALPHAGAN) 0.2 % Drop Place 1 drop into both eyes every 12 (twelve) hours. 3/3/22 3/3/23 Yes Gelacio Temple MD   carvediloL (COREG) 25 MG tablet Take 1 tablet (25 mg total) by mouth 2 (two) times daily with meals. 3/8/22 9/7/22 Yes Dyan Still NP   lisinopriL (PRINIVIL,ZESTRIL) 40 MG tablet Take 1 tablet (40 mg total) by mouth once daily. 3/8/22  Yes Dyan Still NP   netarsudiL-latanoprost (ROCKLATAN) 0.02-0.005 % Drop Place 1 drop into both eyes every evening. 3/3/22  Yes Gelacio Temple MD   nitroGLYCERIN (NITROSTAT) 0.4 MG SL tablet PLACE 1 TABLET UNDER THE TONGUE EVERY 5 (FIVE) MINUTES AS NEEDED FOR CHEST PAIN.MAX 3 DOSES IN15 MIN 3/8/22  Yes Dyan Still NP   spironolactone (ALDACTONE) 100 MG tablet Take 1 tablet (100 mg total) by mouth once daily. 3/8/22 3/8/23 Yes Dyan Still NP   traZODone (DESYREL) 50 MG tablet Take 1 tablet (50 mg total) by mouth every evening. 3/24/22 3/24/23 Yes ALISHA Dobson Jr., MD   azithromycin (ZITHROMAX Z-COLT) 250 MG tablet 500 mg on day 1 (two tablets) followed by 250 mg once daily on days 2-5 8/16/22   Alexi Regalado MD   furosemide (LASIX) 40 MG tablet TAKE 1 TABLET BY MOUTH ONCE DAILY AS NEEDED FOR SWELLING 8/24/22   ALISHA Dobson Jr., MD   methylPREDNISolone (MEDROL DOSEPACK) 4 mg tablet use as directed 8/16/22   Alexi Regalado MD        Allergies:  Review of patient's allergies indicates:   Allergen Reactions    Iodine Anaphylaxis     Shellfish allergy - pt confirms can take Iodine products.    Shellfish containing products Anaphylaxis    Olmesartan Hives        Social History:   Social History     Socioeconomic History    Marital status: Single   Tobacco Use    Smoking status: Every Day     Packs/day: 1.00     Years: 54.00     Pack years: 54.00     Types: Cigarettes     Start date:  "1/1/1970    Smokeless tobacco: Never   Substance and Sexual Activity    Alcohol use: Yes     Alcohol/week: 6.0 standard drinks     Types: 6 Cans of beer per week     Comment: six pack daily    Drug use: Never    Sexual activity: Not Currently   Social History Narrative    Lives alone, no HH as of 03/2022. No other smokers or pets in household.       Family History:  Family History   Problem Relation Age of Onset    Heart disease Mother     Ovarian cancer Mother     Diabetes Father     Heart disease Father     Brain Hemorrhage Sister     Heart disease Brother        ROS: No acute cardiac events, no acute respiratory complaints.     Physical Exam (all patients):    /83   Pulse 77   Temp 97.9 °F (36.6 °C)   Resp 18   Ht 6' 3" (1.905 m)   Wt 102.1 kg (225 lb)   SpO2 97%   BMI 28.12 kg/m²   Lungs: Clear to auscultation bilaterally, respirations unlabored  Heart: Regular rate and rhythm, S1 and S2 normal, no obvious murmurs  Abdomen:         Soft, non-tender, bowel sounds normal, no masses, no organomegaly    Lab Results   Component Value Date    WBC 5.32 06/24/2022    MCV 73 (L) 06/24/2022    RDW 16.9 (H) 06/24/2022     06/24/2022    GLU 92 06/24/2022    HGBA1C 5.7 (H) 03/08/2022    BUN 12 06/24/2022     (L) 06/24/2022    K 5.1 06/24/2022    CL 99 06/24/2022        SEDATION PLAN: per anesthesia      History reviewed, vital signs satisfactory, cardiopulmonary status satisfactory, sedation options, risks and plans have been discussed with the patient  All their questions were answered and the patient agrees to the sedation procedures as planned and the patient is deemed an appropriate candidate for the sedation as planned.    Procedure explained to patient, informed consent obtained and placed in chart.    Sandra Blake  9/7/2022  7:58 AM     "

## 2022-09-08 VITALS
BODY MASS INDEX: 27.98 KG/M2 | WEIGHT: 225 LBS | RESPIRATION RATE: 18 BRPM | HEART RATE: 73 BPM | DIASTOLIC BLOOD PRESSURE: 70 MMHG | HEIGHT: 75 IN | OXYGEN SATURATION: 100 % | SYSTOLIC BLOOD PRESSURE: 128 MMHG | TEMPERATURE: 98 F

## 2022-09-09 LAB
FINAL PATHOLOGIC DIAGNOSIS: NORMAL
GROSS: NORMAL
Lab: NORMAL

## 2022-09-30 ENCOUNTER — HOSPITAL ENCOUNTER (OUTPATIENT)
Dept: RADIOLOGY | Facility: HOSPITAL | Age: 72
Discharge: HOME | End: 2022-09-30
Attending: NURSE PRACTITIONER
Payer: MEDICARE

## 2022-09-30 ENCOUNTER — OFFICE VISIT (OUTPATIENT)
Dept: INTERNAL MEDICINE | Facility: CLINIC | Age: 72
End: 2022-09-30
Payer: MEDICARE

## 2022-09-30 VITALS
HEART RATE: 80 BPM | OXYGEN SATURATION: 97 % | BODY MASS INDEX: 26.42 KG/M2 | WEIGHT: 212.5 LBS | TEMPERATURE: 97 F | DIASTOLIC BLOOD PRESSURE: 74 MMHG | SYSTOLIC BLOOD PRESSURE: 130 MMHG | HEIGHT: 75 IN

## 2022-09-30 DIAGNOSIS — M54.42 ACUTE BILATERAL LOW BACK PAIN WITH BILATERAL SCIATICA: ICD-10-CM

## 2022-09-30 DIAGNOSIS — I26.94 MULTIPLE SUBSEGMENTAL PULMONARY EMBOLI WITHOUT ACUTE COR PULMONALE: ICD-10-CM

## 2022-09-30 DIAGNOSIS — H40.1133 PRIMARY OPEN ANGLE GLAUCOMA (POAG) OF BOTH EYES, SEVERE STAGE: ICD-10-CM

## 2022-09-30 DIAGNOSIS — R73.03 PREDIABETES: ICD-10-CM

## 2022-09-30 DIAGNOSIS — I50.22 CHRONIC SYSTOLIC HEART FAILURE: ICD-10-CM

## 2022-09-30 DIAGNOSIS — J41.8 MIXED SIMPLE AND MUCOPURULENT CHRONIC BRONCHITIS: ICD-10-CM

## 2022-09-30 DIAGNOSIS — M54.41 ACUTE BILATERAL LOW BACK PAIN WITH BILATERAL SCIATICA: ICD-10-CM

## 2022-09-30 DIAGNOSIS — I25.5 ISCHEMIC CARDIOMYOPATHY: ICD-10-CM

## 2022-09-30 DIAGNOSIS — I10 ESSENTIAL HYPERTENSION: Primary | ICD-10-CM

## 2022-09-30 DIAGNOSIS — E78.2 MIXED HYPERLIPIDEMIA: ICD-10-CM

## 2022-09-30 DIAGNOSIS — Z95.810 ICD (IMPLANTABLE CARDIOVERTER-DEFIBRILLATOR) IN PLACE: ICD-10-CM

## 2022-09-30 DIAGNOSIS — I48.21 PERMANENT ATRIAL FIBRILLATION: ICD-10-CM

## 2022-09-30 PROCEDURE — 3075F PR MOST RECENT SYSTOLIC BLOOD PRESS GE 130-139MM HG: ICD-10-PCS | Mod: CPTII,S$GLB,, | Performed by: NURSE PRACTITIONER

## 2022-09-30 PROCEDURE — 3075F SYST BP GE 130 - 139MM HG: CPT | Mod: CPTII,S$GLB,, | Performed by: NURSE PRACTITIONER

## 2022-09-30 PROCEDURE — 3078F PR MOST RECENT DIASTOLIC BLOOD PRESSURE < 80 MM HG: ICD-10-PCS | Mod: CPTII,S$GLB,, | Performed by: NURSE PRACTITIONER

## 2022-09-30 PROCEDURE — 1101F PT FALLS ASSESS-DOCD LE1/YR: CPT | Mod: CPTII,S$GLB,, | Performed by: NURSE PRACTITIONER

## 2022-09-30 PROCEDURE — 3288F FALL RISK ASSESSMENT DOCD: CPT | Mod: CPTII,S$GLB,, | Performed by: NURSE PRACTITIONER

## 2022-09-30 PROCEDURE — 90694 FLU VACCINE - QUADRIVALENT - ADJUVANTED: ICD-10-PCS | Mod: S$GLB,,, | Performed by: NURSE PRACTITIONER

## 2022-09-30 PROCEDURE — 72100 X-RAY EXAM L-S SPINE 2/3 VWS: CPT | Mod: TC

## 2022-09-30 PROCEDURE — G0008 FLU VACCINE - QUADRIVALENT - ADJUVANTED: ICD-10-PCS | Mod: S$GLB,,, | Performed by: NURSE PRACTITIONER

## 2022-09-30 PROCEDURE — 99214 OFFICE O/P EST MOD 30 MIN: CPT | Mod: S$GLB,,, | Performed by: NURSE PRACTITIONER

## 2022-09-30 PROCEDURE — 1126F AMNT PAIN NOTED NONE PRSNT: CPT | Mod: CPTII,S$GLB,, | Performed by: NURSE PRACTITIONER

## 2022-09-30 PROCEDURE — 3008F BODY MASS INDEX DOCD: CPT | Mod: CPTII,S$GLB,, | Performed by: NURSE PRACTITIONER

## 2022-09-30 PROCEDURE — 90694 VACC AIIV4 NO PRSRV 0.5ML IM: CPT | Mod: S$GLB,,, | Performed by: NURSE PRACTITIONER

## 2022-09-30 PROCEDURE — 1101F PR PT FALLS ASSESS DOC 0-1 FALLS W/OUT INJ PAST YR: ICD-10-PCS | Mod: CPTII,S$GLB,, | Performed by: NURSE PRACTITIONER

## 2022-09-30 PROCEDURE — 72100 X-RAY EXAM L-S SPINE 2/3 VWS: CPT | Mod: 26,,, | Performed by: RADIOLOGY

## 2022-09-30 PROCEDURE — 1159F MED LIST DOCD IN RCRD: CPT | Mod: CPTII,S$GLB,, | Performed by: NURSE PRACTITIONER

## 2022-09-30 PROCEDURE — 3288F PR FALLS RISK ASSESSMENT DOCUMENTED: ICD-10-PCS | Mod: CPTII,S$GLB,, | Performed by: NURSE PRACTITIONER

## 2022-09-30 PROCEDURE — 99999 PR PBB SHADOW E&M-EST. PATIENT-LVL V: ICD-10-PCS | Mod: PBBFAC,,, | Performed by: NURSE PRACTITIONER

## 2022-09-30 PROCEDURE — 1159F PR MEDICATION LIST DOCUMENTED IN MEDICAL RECORD: ICD-10-PCS | Mod: CPTII,S$GLB,, | Performed by: NURSE PRACTITIONER

## 2022-09-30 PROCEDURE — 4010F ACE/ARB THERAPY RXD/TAKEN: CPT | Mod: CPTII,S$GLB,, | Performed by: NURSE PRACTITIONER

## 2022-09-30 PROCEDURE — 3078F DIAST BP <80 MM HG: CPT | Mod: CPTII,S$GLB,, | Performed by: NURSE PRACTITIONER

## 2022-09-30 PROCEDURE — 4010F PR ACE/ARB THEARPY RXD/TAKEN: ICD-10-PCS | Mod: CPTII,S$GLB,, | Performed by: NURSE PRACTITIONER

## 2022-09-30 PROCEDURE — G0008 ADMIN INFLUENZA VIRUS VAC: HCPCS | Mod: S$GLB,,, | Performed by: NURSE PRACTITIONER

## 2022-09-30 PROCEDURE — 3044F PR MOST RECENT HEMOGLOBIN A1C LEVEL <7.0%: ICD-10-PCS | Mod: CPTII,S$GLB,, | Performed by: NURSE PRACTITIONER

## 2022-09-30 PROCEDURE — 72100 XR LUMBAR SPINE AP AND LATERAL: ICD-10-PCS | Mod: 26,,, | Performed by: RADIOLOGY

## 2022-09-30 PROCEDURE — 99214 PR OFFICE/OUTPT VISIT, EST, LEVL IV, 30-39 MIN: ICD-10-PCS | Mod: S$GLB,,, | Performed by: NURSE PRACTITIONER

## 2022-09-30 PROCEDURE — 3008F PR BODY MASS INDEX (BMI) DOCUMENTED: ICD-10-PCS | Mod: CPTII,S$GLB,, | Performed by: NURSE PRACTITIONER

## 2022-09-30 PROCEDURE — 3044F HG A1C LEVEL LT 7.0%: CPT | Mod: CPTII,S$GLB,, | Performed by: NURSE PRACTITIONER

## 2022-09-30 PROCEDURE — 1126F PR PAIN SEVERITY QUANTIFIED, NO PAIN PRESENT: ICD-10-PCS | Mod: CPTII,S$GLB,, | Performed by: NURSE PRACTITIONER

## 2022-09-30 PROCEDURE — 99999 PR PBB SHADOW E&M-EST. PATIENT-LVL V: CPT | Mod: PBBFAC,,, | Performed by: NURSE PRACTITIONER

## 2022-09-30 NOTE — PROGRESS NOTES
Subjective:       Patient ID: Raghavendra Luz is a 72 y.o. male.    Chief Complaint: 6 month follow up     HPI    HTN- stable. No HTNsive issues  2) COPD: stable per pt. Intermittent sob, upcoming pulm  3) Hx PE/AF/CAD: was on eliquis/coreg  4) Severe glaucoma: seeing opht   5) LIPIDS:following D&E, tolerating and compliant with med(s).      Today he complaints of leg/back pain. States this pain has been ongoing for years, but getting worse. Denies numbness/tingling/weakness/swelling. States he deals with the pain because he does not want to be a bother to his children        Review of Systems   Constitutional:  Negative for activity change, appetite change, chills, diaphoresis, fatigue, fever and unexpected weight change.   HENT:  Negative for congestion, ear pain, postnasal drip, rhinorrhea, sinus pressure, sinus pain, sneezing, sore throat, tinnitus, trouble swallowing and voice change.    Eyes:  Negative for photophobia, pain and visual disturbance.   Respiratory:  Negative for cough, chest tightness, shortness of breath and wheezing.    Cardiovascular:  Negative for chest pain, palpitations and leg swelling.   Gastrointestinal:  Negative for abdominal distention, abdominal pain, constipation, diarrhea, nausea and vomiting.   Genitourinary:  Negative for decreased urine volume, difficulty urinating, dysuria, flank pain, frequency, hematuria and urgency.   Musculoskeletal:  Positive for arthralgias and back pain. Negative for joint swelling, neck pain and neck stiffness.   Allergic/Immunologic: Negative for immunocompromised state.   Neurological:  Negative for dizziness, tremors, seizures, syncope, facial asymmetry, speech difficulty, weakness, light-headedness, numbness and headaches.   Hematological:  Negative for adenopathy. Does not bruise/bleed easily.   Psychiatric/Behavioral:  Negative for confusion and sleep disturbance.      Objective:      Physical Exam  HENT:      Head: Normocephalic and atraumatic.    Eyes:      Conjunctiva/sclera: Conjunctivae normal.   Cardiovascular:      Rate and Rhythm: Normal rate. Rhythm regularly irregular.      Heart sounds: Normal heart sounds.   Pulmonary:      Effort: Pulmonary effort is normal.      Breath sounds: Normal breath sounds.   Abdominal:      General: Bowel sounds are normal.      Palpations: Abdomen is soft.   Musculoskeletal:      Cervical back: Normal range of motion and neck supple.      Lumbar back: Tenderness present. Decreased range of motion.        Legs:    Skin:     General: Skin is warm and dry.   Neurological:      Mental Status: He is alert and oriented to person, place, and time.       Assessment:     Vitals:    09/30/22 1319   BP: 130/74   Pulse: 80   Temp: 97.2 °F (36.2 °C)         1. Essential hypertension    2. Chronic systolic heart failure    3. ICD (implantable cardioverter-defibrillator) in place    4. Ischemic cardiomyopathy    5. Mixed hyperlipidemia    6. Permanent atrial fibrillation    7. Multiple subsegmental pulmonary emboli without acute cor pulmonale    8. Mixed simple and mucopurulent chronic bronchitis    9. Primary open angle glaucoma (POAG) of both eyes, severe stage    10. Acute bilateral low back pain with bilateral sciatica    11. Prediabetes          Plan:   Essential hypertension    Chronic systolic heart failure    ICD (implantable cardioverter-defibrillator) in place    Ischemic cardiomyopathy    Mixed hyperlipidemia  -     Lipid Panel; Future; Expected date: 03/29/2023  -     Comprehensive Metabolic Panel; Future; Expected date: 03/29/2023    Permanent atrial fibrillation    Multiple subsegmental pulmonary emboli without acute cor pulmonale    Mixed simple and mucopurulent chronic bronchitis    Primary open angle glaucoma (POAG) of both eyes, severe stage    Acute bilateral low back pain with bilateral sciatica  -     X-Ray Lumbar Spine Ap And Lateral; Future; Expected date: 09/30/2022  -     Ambulatory referral/consult to  Physical/Occupational Therapy; Future; Expected date: 10/07/2022    Prediabetes  -     Hemoglobin A1C; Future; Expected date: 03/29/2023    Other orders  -     Influenza - Quadrivalent (Adjuvanted)    Flu shot  Xray back  PT eval/tx  Keep specialty care  F/u in 6 mo w lab

## 2022-10-03 ENCOUNTER — LAB VISIT (OUTPATIENT)
Dept: LAB | Facility: HOSPITAL | Age: 72
End: 2022-10-03
Attending: PEDIATRICS
Payer: MEDICARE

## 2022-10-03 DIAGNOSIS — Z11.59 ENCOUNTER FOR HEPATITIS C SCREENING TEST FOR LOW RISK PATIENT: ICD-10-CM

## 2022-10-03 DIAGNOSIS — Z12.5 PROSTATE CANCER SCREENING: ICD-10-CM

## 2022-10-03 DIAGNOSIS — E78.2 MIXED HYPERLIPIDEMIA: ICD-10-CM

## 2022-10-03 LAB — HCV AB SERPL QL IA: NORMAL

## 2022-10-03 PROCEDURE — 36415 COLL VENOUS BLD VENIPUNCTURE: CPT | Performed by: PEDIATRICS

## 2022-10-03 PROCEDURE — 80053 COMPREHEN METABOLIC PANEL: CPT | Performed by: PEDIATRICS

## 2022-10-03 PROCEDURE — 86803 HEPATITIS C AB TEST: CPT | Performed by: PEDIATRICS

## 2022-10-03 PROCEDURE — 80061 LIPID PANEL: CPT | Performed by: PEDIATRICS

## 2022-10-03 PROCEDURE — 84153 ASSAY OF PSA TOTAL: CPT | Performed by: PEDIATRICS

## 2022-10-04 LAB
ALBUMIN SERPL BCP-MCNC: 4.4 G/DL (ref 3.5–5.2)
ALP SERPL-CCNC: 50 U/L (ref 55–135)
ALT SERPL W/O P-5'-P-CCNC: 31 U/L (ref 10–44)
ANION GAP SERPL CALC-SCNC: 9 MMOL/L (ref 8–16)
AST SERPL-CCNC: 19 U/L (ref 10–40)
BILIRUB SERPL-MCNC: 0.4 MG/DL (ref 0.1–1)
BUN SERPL-MCNC: 14 MG/DL (ref 8–23)
CALCIUM SERPL-MCNC: 9.3 MG/DL (ref 8.7–10.5)
CHLORIDE SERPL-SCNC: 101 MMOL/L (ref 95–110)
CHOLEST SERPL-MCNC: 88 MG/DL (ref 120–199)
CHOLEST/HDLC SERPL: 2.7 {RATIO} (ref 2–5)
CO2 SERPL-SCNC: 21 MMOL/L (ref 23–29)
COMPLEXED PSA SERPL-MCNC: 1 NG/ML (ref 0–4)
CREAT SERPL-MCNC: 1.3 MG/DL (ref 0.5–1.4)
EST. GFR  (NO RACE VARIABLE): 58.7 ML/MIN/1.73 M^2
GLUCOSE SERPL-MCNC: 97 MG/DL (ref 70–110)
HDLC SERPL-MCNC: 33 MG/DL (ref 40–75)
HDLC SERPL: 37.5 % (ref 20–50)
LDLC SERPL CALC-MCNC: 39.4 MG/DL (ref 63–159)
NONHDLC SERPL-MCNC: 55 MG/DL
POTASSIUM SERPL-SCNC: 5 MMOL/L (ref 3.5–5.1)
PROT SERPL-MCNC: 7.1 G/DL (ref 6–8.4)
SODIUM SERPL-SCNC: 131 MMOL/L (ref 136–145)
TRIGL SERPL-MCNC: 78 MG/DL (ref 30–150)

## 2022-10-24 ENCOUNTER — PATIENT MESSAGE (OUTPATIENT)
Dept: RESEARCH | Facility: HOSPITAL | Age: 72
End: 2022-10-24
Payer: MEDICARE

## 2022-10-24 ENCOUNTER — CLINICAL SUPPORT (OUTPATIENT)
Dept: REHABILITATION | Facility: HOSPITAL | Age: 72
End: 2022-10-24
Payer: MEDICARE

## 2022-10-24 DIAGNOSIS — M54.42 ACUTE BILATERAL LOW BACK PAIN WITH BILATERAL SCIATICA: ICD-10-CM

## 2022-10-24 DIAGNOSIS — M54.41 ACUTE BILATERAL LOW BACK PAIN WITH BILATERAL SCIATICA: ICD-10-CM

## 2022-10-24 DIAGNOSIS — R29.898 WEAKNESS OF BOTH HIPS: ICD-10-CM

## 2022-10-24 DIAGNOSIS — M53.86 DECREASED ROM OF LUMBAR SPINE: ICD-10-CM

## 2022-10-24 PROCEDURE — 97163 PT EVAL HIGH COMPLEX 45 MIN: CPT | Performed by: PHYSICAL THERAPIST

## 2022-10-24 PROCEDURE — 97110 THERAPEUTIC EXERCISES: CPT | Performed by: PHYSICAL THERAPIST

## 2022-10-24 NOTE — PLAN OF CARE
[facility]  Physical Therapy Initial Evaluation    Name: Raghavendra Luz  Clinic Number: [id number]    Therapy Diagnosis:  Encounter Diagnoses   Name Primary?    Acute bilateral low back pain with bilateral sciatica     Decreased ROM of lumbar spine     Weakness of both hips      Physician: Dyan Still, NP    Physician Orders: PT Eval and Treat  Medical Diagnosis from Referral: Acute bilateral low back pain with bilateral sciatica  Evaluation Date: 10/24/2022  Authorization Period Expiration: 9/30/2023  Plan of Care Expiration: 12/24/2022  Progress Note Due: 11/24/2022  Visit # / Visits authorized: 1/ 1   FOTO: 1/3    Time In: 9:10  Time Out: 10:00  Total Billable Time: 50 minutes    Precautions: Standard    Subjective   Date of onset: Chronic - years with several month exacerbation  History of current condition - Raghavendra reports: he has had pain in his low back for many months now after a prolonged stay in the hospital following MI, and experiences burning down both of his legs. He states the entire leg burns. Currently, he states that laying in bed with his head elevated with multiple pillows helps, and moving around after sitting for a long time can increase his symptoms. Pt states he has had 6 falls in the previous months and often loses his balance. He states he has glaucoma in both eyes that hinders his sight greatly. He often has to pace himself with household chores and take frequent breaks d/t being tired and increases in his pain. Pt states he lives alone, cannot drive, and really does not get out of the house much. He relies on grandchildren for transportation.    Medical History:   Past Medical History:   Diagnosis Date    Asthma     Cataract     CHF (congestive heart failure)     COPD (chronic obstructive pulmonary disease)     GERD (gastroesophageal reflux disease)     Glaucoma     Hypertension      Surgical History:   Raghavendra Luz  has a past surgical history that  includes Coronary angioplasty with stent; Ankle fracture surgery (Left); Colonoscopy; Colonoscopy (N/A, 5/18/2022); and Colonoscopy (N/A, 9/7/2022).    Medications:   Raghavendra has a current medication list which includes the following prescription(s): albuterol, albuterol, apixaban, aspirin, atorvastatin, azithromycin, bimatoprost, brimonidine 0.2%, carvedilol, furosemide, lisinopril, methylprednisolone, rocklatan, nitroglycerin, spironolactone, and trazodone.    Allergies:   Review of patient's allergies indicates:   Allergen Reactions    Iodine Anaphylaxis     Shellfish allergy - pt confirms can take Iodine products.    Shellfish containing products Anaphylaxis    Olmesartan Hives        Imaging, x-ray: There is questionable mild wedging of the L5 vertebral body.  There is a few mm of anterolisthesis of L4 on L5. Mild-to-moderate disc space narrowing and minimal spondylosis seen at the L5-S1 level.  There is at least moderate bilateral facet arthropathy seen at L4-5 and L5-S1 levels.  Vascular calcifications are noted.    Prior Therapy: no  Social History: lives alone  Occupation: retired  Prior Level of Function: indep, used RW at home  Current Level of Function: LBP limits pt from function I/ADL participation    Pain:  Current 6/10, worst 9/10, best 4/10   Location: bilateral ankles, back , buttocks , feet , knee , and lower legs  Description: Aching, Burning, Throbbing, and Shooting  Aggravating Factors: Sitting, Standing, Bending, Walking, Morning, and Getting out of bed/chair  Easing Factors: relaxation, pain medication, and hot bath    Pts goals: reduce pain, improve daily function, decrease caregiver burden     Red Flag Screening:   Cough  Sneeze  Strain: (--)  Bladder/ bowel: (+)  Falls: (+)  Night pain: (--)  Unexplained weight loss: (--)  General health: multiple co morbidities as stated in PMHx    Objective   Posture:  flexed trunk, decreased lumbar/cervical lordosis    Lumbar Range of Motion:    %  Limitation Pain   Flexion 35%   no        Extension 80%   yes        Left Side Bending 50% no        Right Side Bending 50% no        Left rotation   50% no        Right Rotation   60% yes             Lower Extremity Strength  Right LE  Left LE    Knee extension: 4-/5 Knee extension: 4-/5   Knee flexion: 4-/5 Knee flexion: 4-/5   Hip flexion: 3+/5 Hip flexion: 3+/5   Hip extension:  3/5 Hip extension: 3/5   Hip abduction: 4-/5 Hip abduction: 4-/5   Hip adduction: 4-/5 Hip adduction 4-/5           DTR:   Right Left Comment   Patellar (L3-4) 2+ 2+    Achilles (S1) 2+ 2+        Neuro Dynamic Testing:    Sciatic nerve:      SLR: R = +     L = +   Slump Test:    R = +     L = +      Joint Mobility: gr 1 hypomobility L1-L5    Palpation: B l/s paraspinals, B piriformis    Sensation: NT    Gait: shortened step and stride length, wide OVIDIO, antalgic gait, visual reliance, forward trunk lean, midfoot contact       CMS Impairment/Limitation/Restriction for FOTO Lumbar Survey    Therapist reviewed FOTO scores for Raghavendra Luz on 10/24/2022.   FOTO documents entered into [employer] - see Media section.    Limitation Score: 70%       TREATMENT   Treatment Time In: 9:40  Treatment Time Out: 10:00  Total Treatment time separate from Evaluation: 20 minutes    Raghavendra received therapeutic exercises to develop strength, endurance, ROM, flexibility, posture, and core stabilization for 20 minutes including:  - LTR x15 bilaterally  - SKTC 3x30 sec bilaterally  - Supine Piriformis stretch 3x30 bilaterally  - HEP review     Home Exercises and Patient Education Provided  Education provided:   - Role of PT, PT POC    Written Home Exercises Provided: yes.  Exercises were reviewed and Raghavendra was able to demonstrate them prior to the end of the session.  Raghavendra demonstrated good  understanding of the education provided.     See EMR under Patient Instructions for exercises provided     Assessment   Patient presents with signs and symptoms consistent with  a diagnosis of low back pain with sciatica including all above listed objective impairments. It appears that the patients most significant impairments contributing to their diagnosis are ROM limitations, decreased strength, balance deficits, poor gait mechanics. This pt is a good candidate for skilled PT treatment and stands to benefit from a combination of manual therapy, therapeutic exercise/actvities, neuromuscular re-education, and modalities Prn. The pt has been educated on their dx/POC and consents to further PT treatment.    Pt prognosis is Good.   Pt will benefit from skilled outpatient Physical Therapy to address the deficits stated above and in the chart below, provide pt/family education, and to maximize pt's level of independence.     Plan of care discussed with patient: Yes  Pt's spiritual, cultural and educational needs considered and patient is agreeable to the plan of care and goals as stated below:     Anticipated Barriers for therapy: transportation    Medical Necessity is demonstrated by the following  History  Co-morbidities and personal factors that may impact the plan of care Co-morbidities:   CAD, COPD/asthma, depression, difficulty sleeping, history of CVA, HTN, prior abdominal surgery, and transportation assistance required    Personal Factors:   age  lifestyle     moderate   Examination  Body Structures and Functions, activity limitations and participation restrictions that may impact the plan of care Body Regions:   back  lower extremities    Body Systems:    gross symmetry  ROM  strength  gross coordinated movement  balance  gait  transfers  motor control  motor learning  heart rate    Participation Restrictions:   Functional I/ADL participation    Activity limitations:   Learning and applying knowledge  no deficits    General Tasks and Commands  no deficits    Communication  no deficits    Mobility  lifting and carrying objects  walking  driving (bike, car, motorcycle)    Self  care  washing oneself (bathing, drying, washing hands)  dressing    Domestic Life  shopping  assisting others    Interactions/Relationships  no deficits    Life Areas  no deficits    Community and Social Life  community life         high   Clinical Presentation stable and uncomplicated low   Decision Making/ Complexity Score: high     Goals:   Short Term Goals (3 Weeks):   1) Pt will demonstrate compliance with initial home exercise program as prescribed by physical therapist to improve independence with management of condition.  2) Pt to improve active range of motion in all lumbar motions by 10% to allow for improved functional mobility.  3) Pt to report low back pain of <4/10 at worst during dressing/performing house chores to improve tolerance to ADLs.  4) Pt to tolerate sitting for >1 hour with <2/10 pain in low back to allow for improvement in IADLs.    Long Term Goals (6 Weeks):   1) Pt to achieve <52% limitation as measured by the FOTO to demonstrate decreased low back pain disability.  2) Pt to increase strength to at least 4+/5 of muscles tested to allow for improvement in functional activities such as performing ADLs.  3) Pt to improve active range of motion in all lumbar motions by 30% to allow for improved functional mobility.  4) Pt to report low back pain of <1/10 at worst during daily activities to improve tolerance to ADLs.      Plan   Plan of care Certification: 10/24/2022 to 1/24/2021.    Outpatient Physical Therapy 2 times weekly for 6 weeks to include the following interventions: Cervical/Lumbar Traction, Gait Training, Manual Therapy, Moist Heat/ Ice, Neuromuscular Re-ed, Therapeutic Activities, and Therapeutic Exercise.     Darrian Salazar, PT,b

## 2022-10-24 NOTE — PROGRESS NOTES
See horacio.  Thanks for Consult.      Darrian Salazar PT, DPT  Board Certified Clinical Specialist in Orthopaedic Physical Therapy  [facility]

## 2022-10-31 ENCOUNTER — CLINICAL SUPPORT (OUTPATIENT)
Dept: REHABILITATION | Facility: HOSPITAL | Age: 72
End: 2022-10-31
Payer: MEDICARE

## 2022-10-31 DIAGNOSIS — R29.898 WEAKNESS OF BOTH HIPS: ICD-10-CM

## 2022-10-31 DIAGNOSIS — M53.86 DECREASED ROM OF LUMBAR SPINE: Primary | ICD-10-CM

## 2022-10-31 PROCEDURE — 97110 THERAPEUTIC EXERCISES: CPT | Performed by: PHYSICAL THERAPIST

## 2022-10-31 NOTE — PROGRESS NOTES
"[facility]   Physical Therapy Treatment Note     Name: Raghavendra Luz  Clinic Number: [id number]    Therapy Diagnosis: No diagnosis found.  Physician: Dyan Still NP    Visit Date: 10/31/2022    [copy and paste header from lewisal here]    PTA Visit #: 0/5     Time In: 9:10  Time Out: 10:00    Total Billable Time: 45 minutes    SUBJECTIVE     Pt reports: he felt okay following eval day. States he hasn't taken BP meds today. Pt also reports some increased burning in calves this morning.   He was compliant with home exercise program.  Response to previous treatment: no adverse effects   Functional change: no notable change    Pain: 6/10  Location: bilateral back  and lower legs     OBJECTIVE     Objective Measures updated at progress report unless specified.     Treatment     Raghavendra received the treatments listed below:      therapeutic exercises to develop strength, endurance, ROM, flexibility, posture, and core stabilization for 45 minutes including:  - seated ball rollouts 15 fwd/10 lat ea. Direction 5" holds  - supine bridges 2x10   - seated HS stretch 10x10" bilaterally   - NuStep 10 min    manual therapy techniques: Joint mobilizations and Soft tissue Mobilization were applied to the: back for 00 minutes, including:      neuromuscular re-education activities to improve: Balance, Coordination, Kinesthetic, Sense, Proprioception, and Posture for 00 minutes. The following activities were included:      therapeutic activities to improve functional performance for 00  minutes, including:      gait training to improve functional mobility and safety for 00  minutes, including:      Patient Education and Home Exercises       Home Exercises Provided and Patient Education Provided     Education provided: none     Written Home Exercises Provided: Patient instructed to cont prior HEP. Exercises were reviewed and Raghavendra was able to demonstrate them prior to the end of the session.  Raghavendra " demonstrated good  understanding of the education provided. See EMR under Patient Instructions for exercises provided during therapy sessions    ASSESSMENT     Pt tolerated today's treatment session okay. Required multiple rest breaks d/t complaints of dizziness and decreased endurance. Pt will cont to benefit from pt to address functional deficits.     Raghavendra Is progressing well towards his goals.   Pt prognosis is Excellent.     Varsha Black, SPT assisted in all aspects of today's treatment including manual therapy and administration of exercises.     Pt will continue to benefit from skilled outpatient physical therapy to address the deficits listed in the problem list box on initial evaluation, provide pt/family education and to maximize pt's level of independence in the home and community environment.     Pt's spiritual, cultural and educational needs considered and pt agreeable to plan of care and goals.     Anticipated barriers to physical therapy: none     Goals:     Short Term Goals (3 Weeks):   1) Pt will demonstrate compliance with initial home exercise program as prescribed by physical therapist to improve independence with management of condition.  2) Pt to improve active range of motion in all lumbar motions by 10% to allow for improved functional mobility.  3) Pt to report low back pain of <4/10 at worst during dressing/performing house chores to improve tolerance to ADLs.  4) Pt to tolerate sitting for >1 hour with <2/10 pain in low back to allow for improvement in IADLs.     Long Term Goals (6 Weeks):   1) Pt to achieve <52% limitation as measured by the FOTO to demonstrate decreased low back pain disability.  2) Pt to increase strength to at least 4+/5 of muscles tested to allow for improvement in functional activities such as performing ADLs.  3) Pt to improve active range of motion in all lumbar motions by 30% to allow for improved functional mobility.  4) Pt to report low back pain of <1/10 at  worst during daily activities to improve tolerance to ADLs.    PLAN     Progress as tolerated.     KARINE Amaya  This note was created in whole or at least part by KARINE Amaya and reviewed by Darrian Salazar PT.      Darrian Salazar PT

## 2022-11-04 ENCOUNTER — CLINICAL SUPPORT (OUTPATIENT)
Dept: REHABILITATION | Facility: HOSPITAL | Age: 72
End: 2022-11-04
Payer: MEDICARE

## 2022-11-04 DIAGNOSIS — M53.86 DECREASED ROM OF LUMBAR SPINE: Primary | ICD-10-CM

## 2022-11-04 DIAGNOSIS — R29.898 WEAKNESS OF BOTH HIPS: ICD-10-CM

## 2022-11-04 PROCEDURE — 97110 THERAPEUTIC EXERCISES: CPT | Performed by: PHYSICAL THERAPIST

## 2022-11-04 NOTE — PROGRESS NOTES
"[facility]   Physical Therapy Treatment Note     Name: Raghavendra Luz  Clinic Number: [id number]    Therapy Diagnosis:   Encounter Diagnoses   Name Primary?    Decreased ROM of lumbar spine Yes    Weakness of both hips      Physician: Dyan Still NP    Visit Date: 11/4/2022    Physician Orders: PT Eval and Treat  Medical Diagnosis from Referral: Acute bilateral low back pain with bilateral sciatica  Evaluation Date: 10/24/2022  Authorization Period Expiration: 12/31/2022  Plan of Care Expiration: 12/24/2022  Progress Note Due: 11/24/2022  Visit # / Visits authorized: 3/20 (+1 eval)  FOTO: 1/3    PTA Visit #: 0/5     Time In: 12:00 pm  Time Out: 1:00 pm     Total Billable Time: 45 minutes    SUBJECTIVE     Pt reports: his low back has felt better following last treatment session. Following treatment session, he states he noticed better ability to get in and out of the car. He states that is has came back some but has been less than before. States the burning sensation in B legs has continued.   He was compliant with home exercise program.  Response to previous treatment: decrease in low back pain  Functional change: improved ability to get in and out of car    Pain: 6/10  Location: bilateral back  and lower legs     OBJECTIVE     Objective Measures updated at progress report unless specified.     Treatment     Raghavendra received the treatments listed below:      therapeutic exercises to develop strength, endurance, ROM, flexibility, posture, and core stabilization for 45 minutes including:  - seated ball rollouts 15 fwd/10 lat ea. Direction 5" holds  - seated ball pushouts c TA (4# med ball) 2x15  - seated trunk twists c 4# med ball 2x15 ea. Direction  - standing marches 3# ankle weights 2x1'  - standing butt kicks 3# ankle weights 2x1'  - seated clamshells c GTB 2x30"  - standing DL stance balance on Airex 3x30"   - NuStep 10 min    manual therapy techniques: Joint mobilizations and " Soft tissue Mobilization were applied to the: back for 00 minutes, including:      neuromuscular re-education activities to improve: Balance, Coordination, Kinesthetic, Sense, Proprioception, and Posture for 00 minutes. The following activities were included:      therapeutic activities to improve functional performance for 00  minutes, including:      gait training to improve functional mobility and safety for 00  minutes, including:      Patient Education and Home Exercises       Home Exercises Provided and Patient Education Provided     Education provided: none     Written Home Exercises Provided: Patient instructed to cont prior HEP. Exercises were reviewed and Raghavendra was able to demonstrate them prior to the end of the session.  Raghavendra demonstrated good  understanding of the education provided. See EMR under Patient Instructions for exercises provided during therapy sessions    ASSESSMENT     Pt with improved activity tolerance and better endurance noted with exercises. Patient demonstrating improved lumbar mobility today. Patient states he was in no back pain upon exit from treatment session.     Raghavendra Is progressing well towards his goals.   Pt prognosis is Excellent.     Varsha Black, KARINE assisted in all aspects of today's treatment including manual therapy and administration of exercises.     Pt will continue to benefit from skilled outpatient physical therapy to address the deficits listed in the problem list box on initial evaluation, provide pt/family education and to maximize pt's level of independence in the home and community environment.     Pt's spiritual, cultural and educational needs considered and pt agreeable to plan of care and goals.     Anticipated barriers to physical therapy: none     Goals:     Short Term Goals (3 Weeks):   1) Pt will demonstrate compliance with initial home exercise program as prescribed by physical therapist to improve independence with management of condition.  2) Pt to  improve active range of motion in all lumbar motions by 10% to allow for improved functional mobility.  3) Pt to report low back pain of <4/10 at worst during dressing/performing house chores to improve tolerance to ADLs. improving  4) Pt to tolerate sitting for >1 hour with <2/10 pain in low back to allow for improvement in IADLs.     Long Term Goals (6 Weeks):   1) Pt to achieve <52% limitation as measured by the FOTO to demonstrate decreased low back pain disability.  2) Pt to increase strength to at least 4+/5 of muscles tested to allow for improvement in functional activities such as performing ADLs.  3) Pt to improve active range of motion in all lumbar motions by 30% to allow for improved functional mobility.  4) Pt to report low back pain of <1/10 at worst during daily activities to improve tolerance to ADLs.    PLAN     Progress as tolerated.     KARINE Amaya  This note was created in whole or at least part by KARINE Amaya and reviewed by Jon Sosa PT.      Jon Sosa PT           purple

## 2022-11-07 ENCOUNTER — CLINICAL SUPPORT (OUTPATIENT)
Dept: REHABILITATION | Facility: HOSPITAL | Age: 72
End: 2022-11-07
Payer: MEDICARE

## 2022-11-07 DIAGNOSIS — R29.898 WEAKNESS OF BOTH HIPS: ICD-10-CM

## 2022-11-07 DIAGNOSIS — M53.86 DECREASED ROM OF LUMBAR SPINE: Primary | ICD-10-CM

## 2022-11-07 PROCEDURE — 97110 THERAPEUTIC EXERCISES: CPT | Performed by: PHYSICAL THERAPIST

## 2022-11-07 NOTE — PROGRESS NOTES
"[facility]   Physical Therapy Treatment Note     Name: Raghavendra Luz  Clinic Number: [id number]    Therapy Diagnosis:   Encounter Diagnoses   Name Primary?    Decreased ROM of lumbar spine Yes    Weakness of both hips      Physician: Dyan Still NP    Visit Date: 11/7/2022    Physician Orders: PT Eval and Treat  Medical Diagnosis from Referral: Acute bilateral low back pain with bilateral sciatica  Evaluation Date: 10/24/2022  Authorization Period Expiration: 12/31/2022  Plan of Care Expiration: 12/24/2022  Progress Note Due: 11/24/2022  Visit # / Visits authorized: 3/20 (+1 eval)  FOTO: 1/3    PTA Visit #: 0/5     Time In: 9:00 am   Time Out: 10:00 am     Total Billable Time: 25 minutes    SUBJECTIVE     Pt reports: he is having difficulty with his vision today due to his glaucoma. He states when he left previous treatment session, his was not experiencing any back pain. He states that lasted about ~1 day. He believes it came back after sitting still for a while and not moving. Currently he states that he is still experiencing burning sensation in both legs. Pt states he has not eaten breakfast yet this morning.     He was compliant with home exercise program.  Response to previous treatment: improved functional mobility  Functional change: decreased reliance on household walker    Pain: 6/10  Location: bilateral back  and lower legs     OBJECTIVE     Objective Measures updated at progress report unless specified.     Treatment     Raghavendra received the treatments listed below:      therapeutic exercises to develop strength, endurance, ROM, flexibility, posture, and core stabilization for 55 minutes including:  - seated ball rollouts 15 fwd/10 lat ea. Direction 5" holds  - standing marches 3# ankle weights 2x1'  - standing butt kicks 3# ankle weights 2x1'  - standing gastroc stretch 3x30"  - supine bridges c GTB x30  - staggered stance balance on Airex 2x30" each limb lead   - " NuStep 10 min    manual therapy techniques: Joint mobilizations and Soft tissue Mobilization were applied to the: back for 00 minutes, including:      neuromuscular re-education activities to improve: Balance, Coordination, Kinesthetic, Sense, Proprioception, and Posture for 00 minutes. The following activities were included:      therapeutic activities to improve functional performance for 00  minutes, including:      gait training to improve functional mobility and safety for 00  minutes, including:      Patient Education and Home Exercises       Home Exercises Provided and Patient Education Provided     Education provided: HEP, POC    Written Home Exercises Provided: Patient instructed to cont prior HEP. Exercises were reviewed and Raghavendra was able to demonstrate them prior to the end of the session.  Raghavendra demonstrated good  understanding of the education provided. See EMR under Patient Instructions for exercises provided during therapy sessions    ASSESSMENT     Pt demonstrated decreased activity tolerance today requiring more rest breaks. Balance continues to be challenged, as pt requires CGA to Lisa with such activities. He stated that his low back did feel better following treatment session.     Raghavendra Is progressing well towards his goals.   Pt prognosis is Excellent.     Varsha Black, SPT assisted in all aspects of today's treatment including manual therapy and administration of exercises.     Pt will continue to benefit from skilled outpatient physical therapy to address the deficits listed in the problem list box on initial evaluation, provide pt/family education and to maximize pt's level of independence in the home and community environment.     Pt's spiritual, cultural and educational needs considered and pt agreeable to plan of care and goals.     Anticipated barriers to physical therapy: none     Goals:     Short Term Goals (3 Weeks):   1) Pt will demonstrate compliance with initial home exercise  program as prescribed by physical therapist to improve independence with management of condition. improving  2) Pt to improve active range of motion in all lumbar motions by 10% to allow for improved functional mobility.  3) Pt to report low back pain of <4/10 at worst during dressing/performing house chores to improve tolerance to ADLs.   4) Pt to tolerate sitting for >1 hour with <2/10 pain in low back to allow for improvement in IADLs.     Long Term Goals (6 Weeks):   1) Pt to achieve <52% limitation as measured by the FOTO to demonstrate decreased low back pain disability.  2) Pt to increase strength to at least 4+/5 of muscles tested to allow for improvement in functional activities such as performing ADLs.  3) Pt to improve active range of motion in all lumbar motions by 30% to allow for improved functional mobility.  4) Pt to report low back pain of <1/10 at worst during daily activities to improve tolerance to ADLs.    PLAN     Progress as tolerated.     KARINE Amaya  This note was created in whole or at least part by KARINE Amaya and reviewed by Jon Sosa PT.    Darrian Salazar PT

## 2022-11-11 ENCOUNTER — CLINICAL SUPPORT (OUTPATIENT)
Dept: REHABILITATION | Facility: HOSPITAL | Age: 72
End: 2022-11-11
Payer: MEDICARE

## 2022-11-11 DIAGNOSIS — M53.86 DECREASED ROM OF LUMBAR SPINE: Primary | ICD-10-CM

## 2022-11-11 DIAGNOSIS — R29.898 WEAKNESS OF BOTH HIPS: ICD-10-CM

## 2022-11-11 PROCEDURE — 97110 THERAPEUTIC EXERCISES: CPT | Performed by: PHYSICAL THERAPIST

## 2022-11-11 NOTE — PROGRESS NOTES
"[facility]   Physical Therapy Treatment Note     Name: Raghavendra Luz  Clinic Number: [id number]    Therapy Diagnosis:   Encounter Diagnoses   Name Primary?    Decreased ROM of lumbar spine Yes    Weakness of both hips      Physician: Dyan Still NP    Visit Date: 11/11/2022    Physician Orders: PT Eval and Treat  Medical Diagnosis from Referral: Acute bilateral low back pain with bilateral sciatica  Evaluation Date: 10/24/2022  Authorization Period Expiration: 12/31/2022  Plan of Care Expiration: 12/24/2022  Progress Note Due: 11/24/2022  Visit # / Visits authorized: 4/20 (+1 eval)  FOTO: 1/3    PTA Visit #: 0/5     Time In: 11:00 am   Time Out: 12:45 am     Total Billable Time: 40 minutes    SUBJECTIVE     Pt reports: his back is still bothersome today. States that he woke up this morning feeling like his heart was racing. He proceed to take his blood pressure medication medicine, when he realized he is out. He states he has not taken it in the previous 2 days. States that he is getting it refilled today. Continues to report back pain in the middle back coming around the sides of his trunk.     He was compliant with home exercise program.  Response to previous treatment: improved functional mobility  Functional change: decreased reliance on household walker    Pain: 6/10  Location: bilateral back  and lower legs     OBJECTIVE     BP Pre-Treatment Session: 148/88 mmHg  BP Post-Treatment Session: 135/86 mmHg    Treatment     Raghavendra received the treatments listed below:      therapeutic exercises to develop strength, endurance, ROM, flexibility, posture, and core stabilization for 40 minutes including:  - seated ball rollouts 15 fwd/10 lat ea. Direction 5" holds  - seated marches 2x20   - seated calf raises 2x20   - standing gastroc stretch 3x30" bilaterally   - long sitting hamstring stretch 3x30" bilaterally  - seated abduction c GTB 2x20     manual therapy techniques: Joint " mobilizations and Soft tissue Mobilization were applied to the: back for 00 minutes, including:      neuromuscular re-education activities to improve: Balance, Coordination, Kinesthetic, Sense, Proprioception, and Posture for 00 minutes. The following activities were included:      therapeutic activities to improve functional performance for 00  minutes, including:      gait training to improve functional mobility and safety for 00  minutes, including:      Patient Education and Home Exercises       Home Exercises Provided and Patient Education Provided     Education provided: HEP, POC    Written Home Exercises Provided: Patient instructed to cont prior HEP. Exercises were reviewed and Raghavendra was able to demonstrate them prior to the end of the session.  Raghavendra demonstrated good  understanding of the education provided. See EMR under Patient Instructions for exercises provided during therapy sessions    ASSESSMENT     Patient performed majority of exercises in sitting today due to elevated blood pressure. Patient performed such exercises well. Will continue to monitor blood pressure pre/post treatment session.     Raghavendra Is progressing well towards his goals.   Pt prognosis is Excellent.     Varsha Black, KARINE assisted in all aspects of today's treatment including manual therapy and administration of exercises.     Pt will continue to benefit from skilled outpatient physical therapy to address the deficits listed in the problem list box on initial evaluation, provide pt/family education and to maximize pt's level of independence in the home and community environment.     Pt's spiritual, cultural and educational needs considered and pt agreeable to plan of care and goals.     Anticipated barriers to physical therapy: none     Goals:     Short Term Goals (3 Weeks):   1) Pt will demonstrate compliance with initial home exercise program as prescribed by physical therapist to improve independence with management of condition.  improving  2) Pt to improve active range of motion in all lumbar motions by 10% to allow for improved functional mobility.  3) Pt to report low back pain of <4/10 at worst during dressing/performing house chores to improve tolerance to ADLs.   4) Pt to tolerate sitting for >1 hour with <2/10 pain in low back to allow for improvement in IADLs.     Long Term Goals (6 Weeks):   1) Pt to achieve <52% limitation as measured by the FOTO to demonstrate decreased low back pain disability.  2) Pt to increase strength to at least 4+/5 of muscles tested to allow for improvement in functional activities such as performing ADLs.  3) Pt to improve active range of motion in all lumbar motions by 30% to allow for improved functional mobility.  4) Pt to report low back pain of <1/10 at worst during daily activities to improve tolerance to ADLs.    PLAN     Progress as tolerated.     KARINE Amaya  This note was created in whole or at least part by KARINE Amaya and reviewed by Jon Sosa PT.    Darrian Salazar, PT

## 2022-11-14 ENCOUNTER — CLINICAL SUPPORT (OUTPATIENT)
Dept: REHABILITATION | Facility: HOSPITAL | Age: 72
End: 2022-11-14
Payer: MEDICARE

## 2022-11-14 ENCOUNTER — PATIENT MESSAGE (OUTPATIENT)
Dept: INTERNAL MEDICINE | Facility: CLINIC | Age: 72
End: 2022-11-14
Payer: MEDICARE

## 2022-11-14 ENCOUNTER — PATIENT MESSAGE (OUTPATIENT)
Dept: OPHTHALMOLOGY | Facility: CLINIC | Age: 72
End: 2022-11-14
Payer: MEDICARE

## 2022-11-14 DIAGNOSIS — R29.898 WEAKNESS OF BOTH HIPS: ICD-10-CM

## 2022-11-14 DIAGNOSIS — M53.86 DECREASED ROM OF LUMBAR SPINE: Primary | ICD-10-CM

## 2022-11-14 PROCEDURE — 97110 THERAPEUTIC EXERCISES: CPT | Performed by: PHYSICAL THERAPIST

## 2022-11-14 NOTE — PROGRESS NOTES
[facility]   Physical Therapy Treatment Note     Name: Raghavendra Luz  Clinic Number: [id number]    Therapy Diagnosis:   Encounter Diagnoses   Name Primary?    Decreased ROM of lumbar spine Yes    Weakness of both hips      Physician: Dyan Still NP    Visit Date: 11/14/2022    Physician Orders: PT Eval and Treat  Medical Diagnosis from Referral: Acute bilateral low back pain with bilateral sciatica  Evaluation Date: 10/24/2022  Authorization Period Expiration: 12/31/2022  Plan of Care Expiration: 12/24/2022  Progress Note Due: 11/24/2022  Visit # / Visits authorized: 5/20 (+1 eval)  FOTO: 1/3    PTA Visit #: 0/5     Time In: 9:00 am   Time Out: 10:00 am     Total Billable Time: 40 minutes    SUBJECTIVE     Pt reports: his low back has been doing better. He also states that his legs are not burning today, when he usually experiences burning in the mornings.     He was compliant with home exercise program.  Response to previous treatment: decreased pain  Functional change: improved lumbar mobility   Pain: 4/10  Location: bilateral back  and lower legs     OBJECTIVE     BP Pre-Treatment Session: 128/88 mmHg     Treatment     Raghavnedra received the treatments listed below:      therapeutic exercises to develop strength, endurance, ROM, flexibility, posture, and core stabilization for 40 minutes including:  - seated [place] ball reaches c TA 4# medicine ball  - seated fwd ball reaches c TA 4# medicine ball  - standing toe taps 8in 2x1' 1 UE assist  - slantboard gastroc stretch 3x30 sec bilaterally   - NuStep 10 min    manual therapy techniques: Joint mobilizations and Soft tissue Mobilization were applied to the: back for 00 minutes, including:      neuromuscular re-education activities to improve: Balance, Coordination, Kinesthetic, Sense, Proprioception, and Posture for 00 minutes. The following activities were included:      therapeutic activities to improve functional performance for 00   minutes, including:      gait training to improve functional mobility and safety for 00  minutes, including:      Patient Education and Home Exercises       Home Exercises Provided and Patient Education Provided     Education provided: HEP, POC    Written Home Exercises Provided: Patient instructed to cont prior HEP. Exercises were reviewed and Raghavendra was able to demonstrate them prior to the end of the session.  Raghavendra demonstrated good  understanding of the education provided. See EMR under Patient Instructions for exercises provided during therapy sessions    ASSESSMENT     Patient tolerated treatment session well. Noted increased fatigue with exercises today requiring more rest breaks between activities. Was able to take standing rest breaks between sets of exercises.     Raghavendra Is progressing well towards his goals.   Pt prognosis is Excellent.     Varsha Black, KARINE assisted in all aspects of today's treatment including manual therapy and administration of exercises.     Pt will continue to benefit from skilled outpatient physical therapy to address the deficits listed in the problem list box on initial evaluation, provide pt/family education and to maximize pt's level of independence in the home and community environment.     Pt's spiritual, cultural and educational needs considered and pt agreeable to plan of care and goals.     Anticipated barriers to physical therapy: none     Goals:     Short Term Goals (3 Weeks):   1) Pt will demonstrate compliance with initial home exercise program as prescribed by physical therapist to improve independence with management of condition. improving  2) Pt to improve active range of motion in all lumbar motions by 10% to allow for improved functional mobility.  3) Pt to report low back pain of <4/10 at worst during dressing/performing house chores to improve tolerance to ADLs.   4) Pt to tolerate sitting for >1 hour with <2/10 pain in low back to allow for improvement in  IADLs.     Long Term Goals (6 Weeks):   1) Pt to achieve <52% limitation as measured by the FOTO to demonstrate decreased low back pain disability.  2) Pt to increase strength to at least 4+/5 of muscles tested to allow for improvement in functional activities such as performing ADLs.  3) Pt to improve active range of motion in all lumbar motions by 30% to allow for improved functional mobility.  4) Pt to report low back pain of <1/10 at worst during daily activities to improve tolerance to ADLs.    PLAN     Progress as tolerated.     Varsha Black SPT  This note was created in whole or at least part by KARINE Amaya and reviewed by Jon Sosa, PT.    Darrian Salazar, PT

## 2022-11-18 ENCOUNTER — PATIENT MESSAGE (OUTPATIENT)
Dept: INTERNAL MEDICINE | Facility: CLINIC | Age: 72
End: 2022-11-18
Payer: MEDICARE

## 2022-11-18 ENCOUNTER — CLINICAL SUPPORT (OUTPATIENT)
Dept: REHABILITATION | Facility: HOSPITAL | Age: 72
End: 2022-11-18
Payer: MEDICARE

## 2022-11-18 DIAGNOSIS — M53.86 DECREASED ROM OF LUMBAR SPINE: Primary | ICD-10-CM

## 2022-11-18 DIAGNOSIS — R29.898 WEAKNESS OF BOTH HIPS: ICD-10-CM

## 2022-11-18 PROCEDURE — 97110 THERAPEUTIC EXERCISES: CPT | Mod: CQ

## 2022-11-18 NOTE — PROGRESS NOTES
[facility]   Physical Therapy Treatment Note     Name: Raghavendra Luz  Clinic Number: [id number]    Therapy Diagnosis:   Encounter Diagnoses   Name Primary?    Decreased ROM of lumbar spine Yes    Weakness of both hips      Physician: Dyan Still NP    Visit Date: 11/18/2022    Physician Orders: PT Eval and Treat  Medical Diagnosis from Referral: Acute bilateral low back pain with bilateral sciatica  Evaluation Date: 10/24/2022  Authorization Period Expiration: 12/31/2022  Plan of Care Expiration: 12/24/2022  Progress Note Due: 11/24/2022  Visit # / Visits authorized: 6/20 (+1 eval)  FOTO: 1/3    PTA Visit #: 1/5     Time In: 9:15 am   Time Out: 10:00 am     Total Billable Time: 25 minutes    SUBJECTIVE     Pt reports: he reports that he has been feeling really tired and is having more stomach issues than normally. He adds he has not eaten since 1500 on 11/17/2022. (He reports this above information after this session started.) states that his legs are burning today which is going to his thighs currently.      He was compliant with home exercise program.    Response to previous treatment: felt okay after his last session    Functional change: decreased standing tolerance making doing dishes difficult, unable to take a bath/shower therefore he is washing himself at the bathroom sink.     Pain: 4/10  Location: bilateral back  and lower legs     OBJECTIVE     BP Pre-Treatment Session: 118/78 mmHg   Vitals after this session: 130/80, heart rate 66, and O2 saturation 96%    Treatment     Raghavendra received the treatments listed below:      therapeutic exercises to develop strength, endurance, ROM, flexibility, posture, and core stabilization for 25 minutes including:  - seated overhead ball reaches with  transverse abdominis contraction 4 pound medicine ball  - seated forward ball reaches with transverse abdominis contraction 4 pound medicine ball  - standing toe taps 8 inch 2 x 1 minute 1  upper extremity assist HELD   - slantboard gastroc stretch 3x30 sec bilaterally   - NuStep 10 min HELD  Bridges 2 x 10 with transverse abdominis contraction      manual therapy techniques: Joint mobilizations and Soft tissue Mobilization were applied to the: back for 00 minutes, including:      neuromuscular re-education activities to improve: Balance, Coordination, Kinesthetic, Sense, Proprioception, and Posture for 00 minutes. The following activities were included:      therapeutic activities to improve functional performance for 00  minutes, including:      gait training to improve functional mobility and safety for 00  minutes, including:    Patient was driven to this session by his granddaughter.     Patient Education and Home Exercises       Home Exercises Provided and Patient Education Provided     Education provided: HEP, POC    Written Home Exercises Provided: Patient instructed to cont prior HEP. Exercises were reviewed and Raghavendra was able to demonstrate them prior to the end of the session.  Raghavendra demonstrated good  understanding of the education provided. See EMR under Patient Instructions for exercises provided during therapy sessions    ASSESSMENT     Patient tolerated treatment session with less exercise and moves slowly with an unsteady gait pattern holding onto objects as he walks past them. He appeared to be extremely fatigued today.      Raghavendra Is progressing well towards his goals.   Pt prognosis is Excellent.     Varsha Black, SPT assisted in all aspects of today's treatment including manual therapy and administration of exercises.     Pt will continue to benefit from skilled outpatient physical therapy to address the deficits listed in the problem list box on initial evaluation, provide pt/family education and to maximize pt's level of independence in the home and community environment.     Pt's spiritual, cultural and educational needs considered and pt agreeable to plan of care and goals.      Anticipated barriers to physical therapy: none     Goals:     Short Term Goals (3 Weeks):   1) Pt will demonstrate compliance with initial home exercise program as prescribed by physical therapist to improve independence with management of condition. improving  2) Pt to improve active range of motion in all lumbar motions by 10% to allow for improved functional mobility.  3) Pt to report low back pain of <4/10 at worst during dressing/performing house chores to improve tolerance to ADLs.   4) Pt to tolerate sitting for >1 hour with <2/10 pain in low back to allow for improvement in IADLs.     Long Term Goals (6 Weeks):   1) Pt to achieve <52% limitation as measured by the FOTO to demonstrate decreased low back pain disability.  2) Pt to increase strength to at least 4+/5 of muscles tested to allow for improvement in functional activities such as performing ADLs.  3) Pt to improve active range of motion in all lumbar motions by 30% to allow for improved functional mobility.  4) Pt to report low back pain of <1/10 at worst during daily activities to improve tolerance to ADLs.    PLAN     Progress as tolerated to improve functional mobility allowing patient to be able to ambulate home distances more safely.     Jan Toth, PTA

## 2022-11-21 ENCOUNTER — CLINICAL SUPPORT (OUTPATIENT)
Dept: REHABILITATION | Facility: HOSPITAL | Age: 72
End: 2022-11-21
Payer: MEDICARE

## 2022-11-21 ENCOUNTER — DOCUMENTATION ONLY (OUTPATIENT)
Dept: REHABILITATION | Facility: HOSPITAL | Age: 72
End: 2022-11-21

## 2022-11-21 DIAGNOSIS — R29.898 WEAKNESS OF BOTH HIPS: ICD-10-CM

## 2022-11-21 DIAGNOSIS — M53.86 DECREASED ROM OF LUMBAR SPINE: Primary | ICD-10-CM

## 2022-11-21 PROCEDURE — 97110 THERAPEUTIC EXERCISES: CPT | Mod: CQ

## 2022-11-21 NOTE — PROGRESS NOTES
[facility]   Physical Therapy Treatment Note     Name: Raghavendra Luz  Clinic Number: [id number]    Therapy Diagnosis:   Encounter Diagnoses   Name Primary?    Decreased ROM of lumbar spine Yes    Weakness of both hips      Physician: Dyan Still NP    Visit Date: 11/21/2022    Physician Orders: PT Eval and Treat  Medical Diagnosis from Referral: Acute bilateral low back pain with bilateral sciatica  Evaluation Date: 10/24/2022  Authorization Period Expiration: 12/31/2022  Plan of Care Expiration: 12/24/2022  Progress Note Due: 11/24/2022  Visit # / Visits authorized: 7/20 (+1 eval)  FOTO: 1/3    PTA Visit #: 2/5     Time In: 1000   Time Out: 1045     Total Billable Time: 35 minutes    SUBJECTIVE     Pt reports: he reports that he took his medication that would allow him to come to therapy with good blood pressure. He was in general not feeling well last session but still wanted to come to therapy . He is feeling better today.   He was compliant with home exercise program.    Response to previous treatment: felt poorly after last session     Functional change: decreased standing tolerance making doing dishes difficult, unable to take a bath/shower therefore he is washing himself at the bathroom sink.     Pain: 4/10  Location: bilateral back  and lower legs     OBJECTIVE     BP Pre-Treatment Session: 118/80, heart rate 70, and O2 saturation 97%    Vitals after this session: 115/78 blood pressure, 73 heart rate, 98 and 02 saturation    Treatment     Raghavendra received the treatments listed below:      therapeutic exercises to develop strength, endurance, ROM, flexibility, posture, and core stabilization for 35 minutes including:  - seated overhead ball reaches with  transverse abdominis contraction 4 pound medicine ball HELD  - seated forward ball reaches with transverse abdominis contraction 4 pound medicine ball HELD  - standing toe taps 8 inch 2 x 1 minute 1 upper extremity assist  HELD   - slantboard gastroc stretch 3 x 30 seconds bilaterally   - NuStep 10 min HELD  Bridges 2 x 10 with transverse abdominis contraction   Posterior pelvic tilt x 2 minutes  Posterior pelvic tilt with marching in supine x 20 resting as needed  Abdominal oblique isometric contraction with swiss ball on stomach 1 minute x 2      manual therapy techniques: Joint mobilizations and Soft tissue Mobilization were applied to the: back for 00 minutes, including:      neuromuscular re-education activities to improve: Balance, Coordination, Kinesthetic, Sense, Proprioception, and Posture for 00 minutes. The following activities were included:      therapeutic activities to improve functional performance for 00  minutes, including:      gait training to improve functional mobility and safety for 00  minutes, including:    Patient was driven to this session by his granddaughter.     Patient Education and Home Exercises       Home Exercises Provided and Patient Education Provided     Education provided: HEP, POC    Written Home Exercises Provided: Patient instructed to cont prior HEP. Exercises were reviewed and Raghavendra was able to demonstrate them prior to the end of the session.  Raghavendra demonstrated good  understanding of the education provided. See EMR under Patient Instructions for exercises provided during therapy sessions    ASSESSMENT     Patient had better tolerance to all exercises today.  He required cues for increasing abdominal engagement with all exercise.      Raghavendra Is progressing well towards his goals.   Pt prognosis is Excellent.     Varsha Black, SPT assisted in all aspects of today's treatment including manual therapy and administration of exercises.     Pt will continue to benefit from skilled outpatient physical therapy to address the deficits listed in the problem list box on initial evaluation, provide pt/family education and to maximize pt's level of independence in the home and community environment.      Pt's spiritual, cultural and educational needs considered and pt agreeable to plan of care and goals.     Anticipated barriers to physical therapy: none     Goals:     Short Term Goals (3 Weeks):   1) Pt will demonstrate compliance with initial home exercise program as prescribed by physical therapist to improve independence with management of condition. improving  2) Pt to improve active range of motion in all lumbar motions by 10% to allow for improved functional mobility.  3) Pt to report low back pain of <4/10 at worst during dressing/performing house chores to improve tolerance to ADLs.   4) Pt to tolerate sitting for >1 hour with <2/10 pain in low back to allow for improvement in IADLs.     Long Term Goals (6 Weeks):   1) Pt to achieve <52% limitation as measured by the FOTO to demonstrate decreased low back pain disability.  2) Pt to increase strength to at least 4+/5 of muscles tested to allow for improvement in functional activities such as performing ADLs.  3) Pt to improve active range of motion in all lumbar motions by 30% to allow for improved functional mobility.  4) Pt to report low back pain of <1/10 at worst during daily activities to improve tolerance to ADLs.    PLAN     Progress as tolerated to improve functional mobility allowing patient to be able to ambulate home distances more safely.     Jan Toth, PTA

## 2022-11-21 NOTE — PROGRESS NOTES
PT/PTA met face to face to discuss pt's treatment plan and progress towards established goals. Pt will be seen by a physical therapist minimally every 6th visit or every 30 days.        Jan Toth PTA

## 2022-11-25 ENCOUNTER — CLINICAL SUPPORT (OUTPATIENT)
Dept: REHABILITATION | Facility: HOSPITAL | Age: 72
End: 2022-11-25
Payer: MEDICARE

## 2022-11-25 DIAGNOSIS — R29.898 WEAKNESS OF BOTH HIPS: ICD-10-CM

## 2022-11-25 DIAGNOSIS — M53.86 DECREASED ROM OF LUMBAR SPINE: Primary | ICD-10-CM

## 2022-11-25 PROCEDURE — 97110 THERAPEUTIC EXERCISES: CPT | Mod: CQ

## 2022-11-25 NOTE — PROGRESS NOTES
[facility]   Physical Therapy Progress Report    Name: Raghavendra Luz  Clinic Number: [id number]    Therapy Diagnosis:   Encounter Diagnoses   Name Primary?    Decreased ROM of lumbar spine Yes    Weakness of both hips      Physician: Dyan Still NP    Visit Date: 11/25/2022    Physician Orders: PT Eval and Treat  Medical Diagnosis from Referral: Acute bilateral low back pain with bilateral sciatica  Evaluation Date: 10/24/2022  Authorization Period Expiration: 12/31/2022  Plan of Care Expiration: 12/24/2022  Progress Note Due: 11/24/2022  Visit # / Visits authorized: 8/20 (+1 eval)  FOTO: 1/3    PTA Visit #: 3/5     Time In: 0915  Time Out: 1000     Total Billable Time: 35 minutes    SUBJECTIVE     Pt reports: he reports that he did not take his blood pressure medication this morning. He states that he is not a morning person so coming to therapy at this time is difficult for him.      He was compliant with home exercise program.    Response to previous treatment: felt poorly after last session     Functional change: decreased standing tolerance making doing dishes difficult, unable to take a bath/shower therefore he is washing himself at the bathroom sink. Increased walking distance with less difficulty    Pain: 4/10  Location: right great toe to knee    OBJECTIVE     BP Pre-Treatment Session: 116/90, heart rate 82, and O2 saturation 97%    Vitals after this session: 122/90 blood pressure, 93 heart rate, 98 and 02 saturation    Lumbar Range of Motion:     % Limitation Pain   Flexion 35%    no    11/25/2022    15%      Extension 80%    yes    75%      Left Side Bending 50% no    25%      Right Side Bending 50% no    25%      Left rotation    50% no    20%      Right Rotation    60% yes    10%            Lower Extremity Strength  Right LE   Left LE   Right 11/25/2022 Left 11/25/2022   Knee extension: 4-/5 Knee extension: 4-/5 4+/5 4+/5   Knee flexion: 4-/5 Knee flexion: 4-/5 4/5  4/5   Hip flexion: 3+/5 Hip flexion: 3+/5 4-/5 4+/5   Hip extension:  3/5 Hip extension: 3/5 3+/5 3+/5   Hip abduction: 4-/5 Hip abduction: 4-/5 4-/5 4-/5   Hip adduction: 4-/5 Hip adduction 4-/5 4-/5 4-/5           Treatment     Raghavendra received the treatments listed below:      therapeutic exercises to develop strength, endurance, ROM, flexibility, posture, and core stabilization for 35 minutes including:  - seated overhead ball reaches with  transverse abdominis contraction 4 pound medicine ball HELD  - seated forward ball reaches with transverse abdominis contraction 4 pound medicine ball HELD  - standing toe taps 8 inch 2 x 1 minute 1 upper extremity assist HELD   - slantboard gastroc stretch 3 x 30 seconds bilaterally HELD   - NuStep 10 min HELD  Bridges 2 x 10 with transverse abdominis contraction HELD   Posterior pelvic tilt x 2 minutes HELD   Posterior pelvic tilt with marching in supine x 20 resting as needed HELD   Abdominal oblique isometric contraction with swiss ball on stomach 1 minute x 2 HELD      manual therapy techniques: Joint mobilizations and Soft tissue Mobilization were applied to the: back for 00 minutes, including:      neuromuscular re-education activities to improve: Balance, Coordination, Kinesthetic, Sense, Proprioception, and Posture for 00 minutes. The following activities were included:      therapeutic activities to improve functional performance for 00  minutes, including:      gait training to improve functional mobility and safety for 00  minutes, including:    Patient was driven to this session by his granddaughter.     Patient Education and Home Exercises       Home Exercises Provided and Patient Education Provided     Education provided: HEP, POC    Written Home Exercises Provided: Patient instructed to cont prior HEP. Exercises were reviewed and Raghavendra was able to demonstrate them prior to the end of the session.  Raghavendra demonstrated good  understanding of the education provided.  See EMR under Patient Instructions for exercises provided during therapy sessions    ASSESSMENT     Mr. Mabry in general was moving ore slowly today possibly due to him being fatigued this morning. He was able to demonstrated an improvement in his lumbar range of motion in all directions and was stronger in his bilateral quads and his left hip flexors. All objective measurements are compared to 10/24/2022. His FOTO score has improved and has surpassed his expected outcome. He may benefit from additioanl physical therapy to address his remaining deficits but his limiting factor may be his general health condition. He may benefit from additional physical therapy to address his remaining deficits.       Raghavendra Is progressing well towards his goals.   Pt prognosis is Excellent.     Varsha Black, SPT assisted in all aspects of today's treatment including manual therapy and administration of exercises.     Pt will continue to benefit from skilled outpatient physical therapy to address the deficits listed in the problem list box on initial evaluation, provide pt/family education and to maximize pt's level of independence in the home and community environment.     Pt's spiritual, cultural and educational needs considered and pt agreeable to plan of care and goals.     Anticipated barriers to physical therapy: none     Goals:     Short Term Goals (3 Weeks):   1) Pt will demonstrate compliance with initial home exercise program as prescribed by physical therapist to improve independence with management of condition. Improving 11/25/2022 PROGRESSING  2) Pt to improve active range of motion in all lumbar motions by 10% to allow for improved functional mobility. 11/25/2022 PROGRESSING  3) Pt to report low back pain of <4/10 at worst during dressing/performing house chores to improve tolerance to ADLs. 11/25/2022 MET  4) Pt to tolerate sitting for >1 hour with <2/10 pain in low back to allow for improvement in IADLs. 11/25/2022  MET     Long Term Goals (6 Weeks):   1) Pt to achieve <52% limitation as measured by the FOTO to demonstrate decreased low back pain disability. 11/25/2022 MET  2) Pt to increase strength to at least 4+/5 of muscles tested to allow for improvement in functional activities such as performing ADLs. 11/25/2022 PROGRESSING  3) Pt to improve active range of motion in all lumbar motions by 30% to allow for improved functional mobility. 11/25/2022 PROGRESSING  4) Pt to report low back pain of <1/10 at worst during daily activities to improve tolerance to ADLs. 11/25/2022 PROGRESSING    PLAN     Progress as tolerated to improve functional mobility allowing patient to be able to ambulate home distances more safely.     Jan Toth, PTA

## 2022-11-28 ENCOUNTER — PATIENT MESSAGE (OUTPATIENT)
Dept: INTERNAL MEDICINE | Facility: CLINIC | Age: 72
End: 2022-11-28
Payer: MEDICARE

## 2022-11-28 ENCOUNTER — CLINICAL SUPPORT (OUTPATIENT)
Dept: REHABILITATION | Facility: HOSPITAL | Age: 72
End: 2022-11-28
Payer: MEDICARE

## 2022-11-28 DIAGNOSIS — R29.898 WEAKNESS OF BOTH HIPS: ICD-10-CM

## 2022-11-28 DIAGNOSIS — M53.86 DECREASED ROM OF LUMBAR SPINE: Primary | ICD-10-CM

## 2022-11-28 PROCEDURE — 97110 THERAPEUTIC EXERCISES: CPT | Performed by: PHYSICAL THERAPIST

## 2022-11-28 PROCEDURE — 97112 NEUROMUSCULAR REEDUCATION: CPT | Performed by: PHYSICAL THERAPIST

## 2022-11-28 NOTE — PROGRESS NOTES
[facility]   Physical Therapy Treatment Note    Name: Raghavendra Luz  Clinic Number: [id number]    Therapy Diagnosis:   Encounter Diagnoses   Name Primary?    Decreased ROM of lumbar spine Yes    Weakness of both hips      Physician: Dyan Still NP    Visit Date: 11/28/2022    Physician Orders: PT Eval and Treat  Medical Diagnosis from Referral: Acute bilateral low back pain with bilateral sciatica  Evaluation Date: 10/24/2022  Authorization Period Expiration: 12/31/2022  Plan of Care Expiration: 12/24/2022  Progress Note Due: 11/24/2022  Visit # / Visits authorized: 9/20 (+1 eval)  FOTO: 2/3    PTA Visit #: 3/5     Time In: 9:05 am  Time Out: 9:50 am     Total Billable Time: 25 minutes    SUBJECTIVE     Pt reports: he feels overall like he is doing better. States that he is able to walk from his apartment to his dumpster with ease now, and is almost able to wash dishes without having to sit down and take a rest break. He also reports that the burning sensation is intermittent now, and with less intensity when it comes.     He was compliant with home exercise program.    Response to previous treatment: felt good     Functional change: improved tolerance to standing while washing dishes    Pain: 4/10  Location: right great toe to knee    OBJECTIVE     BP Pre-Treatment Session: 117/88, heart rate 102, and O2 saturation 96%    Vitals after this session: 120/90 blood pressure, 100 heart rate, 97% 02 saturation      Treatment     Raghavendra received the treatments listed below:      therapeutic exercises to develop strength, endurance, ROM, flexibility, posture, and core stabilization for 30 minutes including:  - seated overhead ball reaches with  transverse abdominis contraction 4 pound medicine ball 2x10  - seated forward ball reaches with transverse abdominis contraction 4 pound medicine ball 2x10  - standing toe taps 8 inch 2 x 1 minute 1 upper extremity assist   - slantboard gastroc  "stretch 3 x 30 seconds bilaterally   - standing butt kicks 2 x 1 min 1 upper extremity assist   - NuStep 10 min HELD  Bridges 2 x 10 with transverse abdominis contraction HELD   Posterior pelvic tilt x 2 minutes HELD   Posterior pelvic tilt with marching in supine x 20 resting as needed HELD   Abdominal oblique isometric contraction with swiss ball on stomach 1 minute x 2 HELD      manual therapy techniques: Joint mobilizations and Soft tissue Mobilization were applied to the: back for 00 minutes, including:      neuromuscular re-education activities to improve: Balance, Coordination, Kinesthetic, Sense, Proprioception, and Posture for 10 minutes. The following activities were included:  - standing balance on [employer] eyes open 3x30"  - standing balance on [employer] eyes closed 3x30"      therapeutic activities to improve functional performance for 00  minutes, including:      gait training to improve functional mobility and safety for 00  minutes, including:    Patient was driven to this session by his granddaughter.     Patient Education and Home Exercises       Home Exercises Provided and Patient Education Provided     Education provided: HEP, POC    Written Home Exercises Provided: Patient instructed to cont prior HEP. Exercises were reviewed and Raghavendra was able to demonstrate them prior to the end of the session.  Raghavendra demonstrated good  understanding of the education provided. See EMR under Patient Instructions for exercises provided during therapy sessions    ASSESSMENT     Patient did well overall. Patient demonstrated improved activity tolerance to treatment session with being able to take standing rest breaks between activities. Patient required contact guard assist for balance activities with no balance checks and minimal sway noted indicating improved balance.     Raghavendra Is progressing well towards his goals.   Pt prognosis is Excellent.     Varsha Black, SPT assisted in all aspects of today's treatment including " manual therapy and administration of exercises.     Pt will continue to benefit from skilled outpatient physical therapy to address the deficits listed in the problem list box on initial evaluation, provide pt/family education and to maximize pt's level of independence in the home and community environment.     Pt's spiritual, cultural and educational needs considered and pt agreeable to plan of care and goals.     Anticipated barriers to physical therapy: none     Goals:     Short Term Goals (3 Weeks):   1) Pt will demonstrate compliance with initial home exercise program as prescribed by physical therapist to improve independence with management of condition. Improving 11/25/2022 PROGRESSING  2) Pt to improve active range of motion in all lumbar motions by 10% to allow for improved functional mobility. 11/25/2022 PROGRESSING  3) Pt to report low back pain of <4/10 at worst during dressing/performing house chores to improve tolerance to ADLs. 11/25/2022 MET  4) Pt to tolerate sitting for >1 hour with <2/10 pain in low back to allow for improvement in IADLs. 11/25/2022 MET     Long Term Goals (6 Weeks):   1) Pt to achieve <52% limitation as measured by the FOTO to demonstrate decreased low back pain disability. 11/25/2022 MET  2) Pt to increase strength to at least 4+/5 of muscles tested to allow for improvement in functional activities such as performing ADLs. 11/25/2022 PROGRESSING  3) Pt to improve active range of motion in all lumbar motions by 30% to allow for improved functional mobility. 11/25/2022 PROGRESSING  4) Pt to report low back pain of <1/10 at worst during daily activities to improve tolerance to ADLs. 11/25/2022 PROGRESSING    PLAN     Progress as tolerated to improve functional mobility allowing patient to be able to ambulate home distances more safely.     Varsha Black, SPT  This note was created in whole or at least part by KARINE Amaya and reviewed by Darrian Salazar PT.     Darrian Salazar PT

## 2022-12-02 ENCOUNTER — CLINICAL SUPPORT (OUTPATIENT)
Dept: REHABILITATION | Facility: HOSPITAL | Age: 72
End: 2022-12-02
Payer: MEDICARE

## 2022-12-02 DIAGNOSIS — M53.86 DECREASED ROM OF LUMBAR SPINE: Primary | ICD-10-CM

## 2022-12-02 DIAGNOSIS — R29.898 WEAKNESS OF BOTH HIPS: ICD-10-CM

## 2022-12-02 PROCEDURE — 97110 THERAPEUTIC EXERCISES: CPT | Mod: CQ

## 2022-12-02 NOTE — PROGRESS NOTES
[facility]   Physical Therapy Treatment Note    Name: Raghavendra Luz  Clinic Number: [id number]    Therapy Diagnosis:   Encounter Diagnoses   Name Primary?    Decreased ROM of lumbar spine Yes    Weakness of both hips      Physician: Dyan Still NP    Visit Date: 12/2/2022    Physician Orders: PT Eval and Treat  Medical Diagnosis from Referral: Acute bilateral low back pain with bilateral sciatica  Evaluation Date: 10/24/2022  Authorization Period Expiration: 12/31/2022  Plan of Care Expiration: 12/24/2022  Progress Note Due: 11/24/2022  Visit # / Visits authorized: 10/20 (+1 eval)  FOTO: 2/3    PTA Visit #: 1/5     Time In: 12:25 pm  Time Out: 1:30 pm     Total Billable Time: 39 minutes    SUBJECTIVE     Pt reports: he feels unsteady on his feet today. Walking slowly and needs to take rest for longer distances. Describes shoulder pain 2 nights ago which has resolved.      He was compliant with home exercise program.    Response to previous treatment: felt okay after last session    Functional change: improved tolerance to standing while washing dishes    Pain: 0/10  Location: right great toe to knee    OBJECTIVE     BP Pre-Treatment Session: 115/90, heart rate 98, and O2 saturation 97%    Vitals after this session: 104/82 blood pressure, 107 heart rate, 98% 02 saturation      Treatment     Raghavendra received the treatments listed below:      therapeutic exercises to develop strength, endurance, ROM, flexibility, posture, and core stabilization for 39 minutes including:  - seated overhead ball reaches with  transverse abdominis contraction 4 pound medicine ball 2x10 HELD  - seated forward ball reaches with transverse abdominis contraction 4 pound medicine ball 2x10 HELD  - standing toe taps 8 inch 2 x 1 minute 1 upper extremity assist HELD  - slantboard gastroc stretch 3 x 30 seconds bilaterally HELD  - standing butt kicks 2 x 1 min 1 upper extremity assist HELD  - NuStep 10 min  "HELD  Bridges 30 x with transverse abdominis contraction without rest   Posterior pelvic tilt x 2 minutes    Posterior pelvic tilt with marching in supine x 20 resting as needed HELD   Abdominal oblique isometric contraction with swiss ball on stomach 1 minute x 2 HELD    Sitting with good posture hip adduction isometric 3 second hold x 2 minutes HELD  Sitting abdominal stability march in place 2 minutes alternating lower extremity   Sitting abdominal stability 2 minutes x 2 long arch quad alternating lower extremity   Lower trunk rotation 3 minutes   Single knee to chest 10 x 10 seconds     manual therapy techniques: Joint mobilizations and Soft tissue Mobilization were applied to the: back for 00 minutes, including:      neuromuscular re-education activities to improve: Balance, Coordination, Kinesthetic, Sense, Proprioception, and Posture for 10 minutes. The following activities were included:  - standing balance on Airex eyes open 3x30" HELD  - standing balance on Airex eyes closed 3x30" HELD     therapeutic activities to improve functional performance for 00  minutes, including:      gait training to improve functional mobility and safety for 00  minutes, including:    Patient was driven to this session by his granddaughter.     Patient Education and Home Exercises       Home Exercises Provided and Patient Education Provided     Education provided: HEP, POC    Written Home Exercises Provided: Patient instructed to cont prior HEP. Exercises were reviewed and Raghavenrda was able to demonstrate them prior to the end of the session.  Raghavendra demonstrated good  understanding of the education provided. See EMR under Patient Instructions for exercises provided during therapy sessions    ASSESSMENT     Mr. Mabry tolerated this session with increased fatigue and required a rest between each exercise. He ambulated more slowly as compared to the last time this PTA saw him. Patient reports that hs is feeling better after " initiating lower trunk rotation exercise and notices an increase in range of motion as the lower trunk rotation exercise progressed.      Raghavendra Is progressing well towards his goals.   Pt prognosis is Excellent.     Varsha Black, SPT assisted in all aspects of today's treatment including manual therapy and administration of exercises.     Pt will continue to benefit from skilled outpatient physical therapy to address the deficits listed in the problem list box on initial evaluation, provide pt/family education and to maximize pt's level of independence in the home and community environment.     Pt's spiritual, cultural and educational needs considered and pt agreeable to plan of care and goals.     Anticipated barriers to physical therapy: none     Goals:     Short Term Goals (3 Weeks):   1) Pt will demonstrate compliance with initial home exercise program as prescribed by physical therapist to improve independence with management of condition. Improving 11/25/2022 PROGRESSING  2) Pt to improve active range of motion in all lumbar motions by 10% to allow for improved functional mobility. 11/25/2022 PROGRESSING  3) Pt to report low back pain of <4/10 at worst during dressing/performing house chores to improve tolerance to ADLs. 11/25/2022 MET  4) Pt to tolerate sitting for >1 hour with <2/10 pain in low back to allow for improvement in IADLs. 11/25/2022 MET     Long Term Goals (6 Weeks):   1) Pt to achieve <52% limitation as measured by the FOTO to demonstrate decreased low back pain disability. 11/25/2022 MET  2) Pt to increase strength to at least 4+/5 of muscles tested to allow for improvement in functional activities such as performing ADLs. 11/25/2022 PROGRESSING  3) Pt to improve active range of motion in all lumbar motions by 30% to allow for improved functional mobility. 11/25/2022 PROGRESSING  4) Pt to report low back pain of <1/10 at worst during daily activities to improve tolerance to ADLs. 11/25/2022  PROGRESSING    PLAN     Progress as tolerated to improve functional mobility allowing patient to be able to ambulate home distances more safely.     KARINE Amaya  This note was created in whole or at least part by KARINE Amaya and reviewed by Darrian Salazar PT.     Jan Toth, PTA

## 2022-12-05 ENCOUNTER — CLINICAL SUPPORT (OUTPATIENT)
Dept: REHABILITATION | Facility: HOSPITAL | Age: 72
End: 2022-12-05
Payer: MEDICARE

## 2022-12-05 DIAGNOSIS — M54.16 LUMBAR RADICULOPATHY: ICD-10-CM

## 2022-12-05 DIAGNOSIS — M53.86 DECREASED ROM OF LUMBAR SPINE: Primary | ICD-10-CM

## 2022-12-05 DIAGNOSIS — R29.898 WEAKNESS OF BOTH HIPS: ICD-10-CM

## 2022-12-05 DIAGNOSIS — M51.36 DDD (DEGENERATIVE DISC DISEASE), LUMBAR: Primary | ICD-10-CM

## 2022-12-05 PROCEDURE — 97110 THERAPEUTIC EXERCISES: CPT | Performed by: PHYSICAL THERAPIST

## 2022-12-05 NOTE — PROGRESS NOTES
[facility]   Physical Therapy Treatment Note    Name: Raghavendra Luz  Clinic Number: [id number]    Therapy Diagnosis:   Encounter Diagnoses   Name Primary?    Decreased ROM of lumbar spine Yes    Weakness of both hips      Physician: Dyan Still NP    Visit Date: 12/5/2022    Physician Orders: PT Eval and Treat  Medical Diagnosis from Referral: Acute bilateral low back pain with bilateral sciatica  Evaluation Date: 10/24/2022  Authorization Period Expiration: 12/31/2022  Plan of Care Expiration: 12/24/2022  Progress Note Due: 11/24/2022  Visit # / Visits authorized: 10/20 (+1 eval)  FOTO: 2/3    PTA Visit #: 1/5     Time In: 9:00 am  Time Out: 9:50 am     Total Billable Time:  25 minutes    SUBJECTIVE     Pt reports: he feels off today, but cannot really describe how he feels.     He was compliant with home exercise program.    Response to previous treatment: felt okay after last session    Functional change: improved tolerance to standing while washing dishes    Pain: 0/10  Location: right great toe to knee    OBJECTIVE     BP Pre-Treatment Session: 104/78    Treatment     Raghavendra received the treatments listed below:      therapeutic exercises to develop strength, endurance, ROM, flexibility, posture, and core stabilization for 30 minutes including:  - standing marching 3x1 min   - sit to stands 2x10   - NuStep 10'       manual therapy techniques: Joint mobilizations and Soft tissue Mobilization were applied to the: back for 00 minutes, including:      neuromuscular re-education activities to improve: Balance, Coordination, Kinesthetic, Sense, Proprioception, and Posture for  minutes. The following activities were included:     therapeutic activities to improve functional performance for 00  minutes, including:    gait training to improve functional mobility and safety for 00  minutes, including:    Patient was driven to this session by his granddaughter.     Patient Education and  Home Exercises       Home Exercises Provided and Patient Education Provided     Education provided: HEP, POC    Written Home Exercises Provided: Patient instructed to cont prior HEP. Exercises were reviewed and Raghavendra was able to demonstrate them prior to the end of the session.  Raghavendra demonstrated good  understanding of the education provided. See EMR under Patient Instructions for exercises provided during therapy sessions    ASSESSMENT     Patient has made progress in PT over the past 12 sessions. However, at this time, it is my judgement that this patient is no longer appropriate for outpatient physical therapy services due to severe safety concerns outside of the home. Discussed with patient about home health physical therapy. Patient was in agreement with this and voiced that he would feel much safer receiving PT in the home.    Raghavendra Is progressing well towards his goals.   Pt prognosis is Excellent.     Varsha Black, SPT assisted in all aspects of today's treatment including manual therapy and administration of exercises.     Pt will continue to benefit from skilled outpatient physical therapy to address the deficits listed in the problem list box on initial evaluation, provide pt/family education and to maximize pt's level of independence in the home and community environment.     Pt's spiritual, cultural and educational needs considered and pt agreeable to plan of care and goals.     Anticipated barriers to physical therapy: none     Goals:     Short Term Goals (3 Weeks):   1) Pt will demonstrate compliance with initial home exercise program as prescribed by physical therapist to improve independence with management of condition. Improving 11/25/2022 PROGRESSING  2) Pt to improve active range of motion in all lumbar motions by 10% to allow for improved functional mobility. 11/25/2022 PROGRESSING  3) Pt to report low back pain of <4/10 at worst during dressing/performing house chores to improve tolerance to  ADLs. 11/25/2022 MET  4) Pt to tolerate sitting for >1 hour with <2/10 pain in low back to allow for improvement in IADLs. 11/25/2022 MET     Long Term Goals (6 Weeks):   1) Pt to achieve <52% limitation as measured by the FOTO to demonstrate decreased low back pain disability. 11/25/2022 MET  2) Pt to increase strength to at least 4+/5 of muscles tested to allow for improvement in functional activities such as performing ADLs. 11/25/2022 PROGRESSING  3) Pt to improve active range of motion in all lumbar motions by 30% to allow for improved functional mobility. 11/25/2022 PROGRESSING  4) Pt to report low back pain of <1/10 at worst during daily activities to improve tolerance to ADLs. 11/25/2022 PROGRESSING    PLAN     Discharge at this time, as patient is no longer appropriate for outpatient therapy services.     Varsha Black SPT  This note was created in whole or at least part by KARINE Amaya and reviewed by Darrian Salazar PT.     Darrian Salazar PT

## 2022-12-23 ENCOUNTER — EXTERNAL HOME HEALTH (OUTPATIENT)
Dept: HOME HEALTH SERVICES | Facility: HOSPITAL | Age: 72
End: 2022-12-23
Payer: MEDICARE

## 2023-01-06 ENCOUNTER — TELEPHONE (OUTPATIENT)
Dept: INTERNAL MEDICINE | Facility: CLINIC | Age: 73
End: 2023-01-06
Payer: MEDICARE

## 2023-01-09 DIAGNOSIS — R29.898 WEAKNESS OF BOTH HIPS: ICD-10-CM

## 2023-01-09 DIAGNOSIS — I50.22 CHRONIC SYSTOLIC HEART FAILURE: Primary | ICD-10-CM

## 2023-02-07 DIAGNOSIS — Z00.00 ENCOUNTER FOR MEDICARE ANNUAL WELLNESS EXAM: ICD-10-CM

## 2023-02-09 DIAGNOSIS — Z00.00 ENCOUNTER FOR MEDICARE ANNUAL WELLNESS EXAM: ICD-10-CM

## 2023-02-13 DIAGNOSIS — I25.5 ISCHEMIC CARDIOMYOPATHY: ICD-10-CM

## 2023-02-13 DIAGNOSIS — J41.8 MIXED SIMPLE AND MUCOPURULENT CHRONIC BRONCHITIS: ICD-10-CM

## 2023-02-13 DIAGNOSIS — I50.22 CHRONIC SYSTOLIC HEART FAILURE: Primary | ICD-10-CM

## 2023-02-15 ENCOUNTER — TELEPHONE (OUTPATIENT)
Dept: PULMONOLOGY | Facility: CLINIC | Age: 73
End: 2023-02-15
Payer: MEDICARE

## 2023-02-15 NOTE — TELEPHONE ENCOUNTER
----- Message from Flori Mabry sent at 2/15/2023  1:25 PM CST -----  Contact: Delores/granddaughter  Pt granddaughter is calling in regards to getting the pt appt on 02/16 pushed back.due to her being in school.Any time after 12 noon.Please call back at [phone]        Thanks  YUDITH

## 2023-02-23 ENCOUNTER — PATIENT MESSAGE (OUTPATIENT)
Dept: INTERNAL MEDICINE | Facility: CLINIC | Age: 73
End: 2023-02-23
Payer: MEDICARE

## 2023-02-23 DIAGNOSIS — F17.200 SMOKER: Primary | ICD-10-CM

## 2023-02-24 ENCOUNTER — PATIENT OUTREACH (OUTPATIENT)
Dept: ADMINISTRATIVE | Facility: CLINIC | Age: 73
End: 2023-02-24
Payer: MEDICARE

## 2023-02-24 ENCOUNTER — EXTERNAL HOSPITAL ADMISSION (OUTPATIENT)
Dept: ADMINISTRATIVE | Facility: CLINIC | Age: 73
End: 2023-02-24
Payer: MEDICARE

## 2023-02-24 NOTE — PROGRESS NOTES
C3 nurse attempted to contact Raghavendra Luz for a TCC post hospital discharge follow up call. No answer. No voicemail available.The patient does not have a scheduled HOSFU appointment. Message sent to PCP staff for assistance with scheduling visit with patient.

## 2023-02-27 NOTE — PROGRESS NOTES
C3 nurse attempted to contact Raghavendra Luz for a TCC post hospital discharge follow up call. Patient not available at this time. CB information provided to family member that answered the phone. The patient does not have a scheduled HOSFU appointment. Message previously sent to PCP staff for assistance with scheduling visit with patient.

## 2023-02-28 ENCOUNTER — PATIENT MESSAGE (OUTPATIENT)
Dept: ADMINISTRATIVE | Facility: CLINIC | Age: 73
End: 2023-02-28
Payer: MEDICARE

## 2023-03-01 NOTE — PROGRESS NOTES
3rd attempt-C3 nurse attempted to contact Raghavendra Luz for a TCC post hospital discharge follow up call. Patient not available at this time. CB information provided to family member that answered the phone. The patient does not have a scheduled HOSFU appointment. Message previously sent to PCP staff for assistance with scheduling visit with patient.

## 2023-03-02 ENCOUNTER — PATIENT MESSAGE (OUTPATIENT)
Dept: INTERNAL MEDICINE | Facility: CLINIC | Age: 73
End: 2023-03-02
Payer: MEDICARE

## 2023-03-13 ENCOUNTER — TELEPHONE (OUTPATIENT)
Dept: PULMONOLOGY | Facility: CLINIC | Age: 73
End: 2023-03-13
Payer: MEDICARE

## 2023-03-22 ENCOUNTER — LAB VISIT (OUTPATIENT)
Dept: LAB | Facility: HOSPITAL | Age: 73
End: 2023-03-22
Attending: NURSE PRACTITIONER
Payer: MEDICARE

## 2023-03-22 DIAGNOSIS — R73.03 PREDIABETES: ICD-10-CM

## 2023-03-22 DIAGNOSIS — E78.2 MIXED HYPERLIPIDEMIA: ICD-10-CM

## 2023-03-22 LAB
ALBUMIN SERPL BCP-MCNC: 4.4 G/DL (ref 3.5–5.2)
ALP SERPL-CCNC: 44 U/L (ref 55–135)
ALT SERPL W/O P-5'-P-CCNC: 37 U/L (ref 10–44)
ANION GAP SERPL CALC-SCNC: 9 MMOL/L (ref 8–16)
AST SERPL-CCNC: 22 U/L (ref 10–40)
BILIRUB SERPL-MCNC: 0.4 MG/DL (ref 0.1–1)
BUN SERPL-MCNC: 11 MG/DL (ref 8–23)
CALCIUM SERPL-MCNC: 10 MG/DL (ref 8.7–10.5)
CHLORIDE SERPL-SCNC: 100 MMOL/L (ref 95–110)
CHOLEST SERPL-MCNC: 122 MG/DL (ref 120–199)
CHOLEST/HDLC SERPL: 3.2 {RATIO} (ref 2–5)
CO2 SERPL-SCNC: 22 MMOL/L (ref 23–29)
CREAT SERPL-MCNC: 1.1 MG/DL (ref 0.5–1.4)
EST. GFR  (NO RACE VARIABLE): >60 ML/MIN/1.73 M^2
ESTIMATED AVG GLUCOSE: 123 MG/DL (ref 68–131)
GLUCOSE SERPL-MCNC: 95 MG/DL (ref 70–110)
HBA1C MFR BLD: 5.9 % (ref 4–5.6)
HDLC SERPL-MCNC: 38 MG/DL (ref 40–75)
HDLC SERPL: 31.1 % (ref 20–50)
LDLC SERPL CALC-MCNC: 53.2 MG/DL (ref 63–159)
NONHDLC SERPL-MCNC: 84 MG/DL
POTASSIUM SERPL-SCNC: 4.9 MMOL/L (ref 3.5–5.1)
PROT SERPL-MCNC: 7.5 G/DL (ref 6–8.4)
SODIUM SERPL-SCNC: 131 MMOL/L (ref 136–145)
TRIGL SERPL-MCNC: 154 MG/DL (ref 30–150)

## 2023-03-22 PROCEDURE — 83036 HEMOGLOBIN GLYCOSYLATED A1C: CPT | Mod: HCNC | Performed by: NURSE PRACTITIONER

## 2023-03-22 PROCEDURE — 36415 COLL VENOUS BLD VENIPUNCTURE: CPT | Mod: HCNC | Performed by: NURSE PRACTITIONER

## 2023-03-22 PROCEDURE — 80053 COMPREHEN METABOLIC PANEL: CPT | Mod: HCNC | Performed by: NURSE PRACTITIONER

## 2023-03-22 PROCEDURE — 80061 LIPID PANEL: CPT | Mod: HCNC | Performed by: NURSE PRACTITIONER

## 2023-03-30 ENCOUNTER — OFFICE VISIT (OUTPATIENT)
Dept: PULMONOLOGY | Facility: CLINIC | Age: 73
End: 2023-03-30
Payer: MEDICARE

## 2023-03-30 VITALS
HEART RATE: 76 BPM | DIASTOLIC BLOOD PRESSURE: 64 MMHG | OXYGEN SATURATION: 99 % | RESPIRATION RATE: 18 BRPM | SYSTOLIC BLOOD PRESSURE: 92 MMHG | BODY MASS INDEX: 26.26 KG/M2 | WEIGHT: 211.19 LBS | HEIGHT: 75 IN

## 2023-03-30 DIAGNOSIS — F17.218 CIGARETTE NICOTINE DEPENDENCE WITH OTHER NICOTINE-INDUCED DISORDER: ICD-10-CM

## 2023-03-30 DIAGNOSIS — I48.21 PERMANENT ATRIAL FIBRILLATION: Primary | ICD-10-CM

## 2023-03-30 DIAGNOSIS — I10 ESSENTIAL HYPERTENSION: ICD-10-CM

## 2023-03-30 DIAGNOSIS — J41.8 MIXED SIMPLE AND MUCOPURULENT CHRONIC BRONCHITIS: ICD-10-CM

## 2023-03-30 DIAGNOSIS — J44.9 STAGE 3 SEVERE COPD BY GOLD CLASSIFICATION: ICD-10-CM

## 2023-03-30 DIAGNOSIS — H40.1133 PRIMARY OPEN ANGLE GLAUCOMA (POAG) OF BOTH EYES, SEVERE STAGE: ICD-10-CM

## 2023-03-30 PROBLEM — F17.210 DEPENDENCE ON NICOTINE FROM CIGARETTES: Status: ACTIVE | Noted: 2023-03-30

## 2023-03-30 PROCEDURE — 99215 PR OFFICE/OUTPT VISIT, EST, LEVL V, 40-54 MIN: ICD-10-PCS | Mod: HCNC,S$GLB,, | Performed by: HOSPITALIST

## 2023-03-30 PROCEDURE — 3078F PR MOST RECENT DIASTOLIC BLOOD PRESSURE < 80 MM HG: ICD-10-PCS | Mod: HCNC,CPTII,S$GLB, | Performed by: HOSPITALIST

## 2023-03-30 PROCEDURE — 3008F PR BODY MASS INDEX (BMI) DOCUMENTED: ICD-10-PCS | Mod: HCNC,CPTII,S$GLB, | Performed by: HOSPITALIST

## 2023-03-30 PROCEDURE — 3044F HG A1C LEVEL LT 7.0%: CPT | Mod: HCNC,CPTII,S$GLB, | Performed by: HOSPITALIST

## 2023-03-30 PROCEDURE — 3008F BODY MASS INDEX DOCD: CPT | Mod: HCNC,CPTII,S$GLB, | Performed by: HOSPITALIST

## 2023-03-30 PROCEDURE — 1125F PR PAIN SEVERITY QUANTIFIED, PAIN PRESENT: ICD-10-PCS | Mod: HCNC,CPTII,S$GLB, | Performed by: HOSPITALIST

## 2023-03-30 PROCEDURE — 1160F RVW MEDS BY RX/DR IN RCRD: CPT | Mod: HCNC,CPTII,S$GLB, | Performed by: HOSPITALIST

## 2023-03-30 PROCEDURE — 1160F PR REVIEW ALL MEDS BY PRESCRIBER/CLIN PHARMACIST DOCUMENTED: ICD-10-PCS | Mod: HCNC,CPTII,S$GLB, | Performed by: HOSPITALIST

## 2023-03-30 PROCEDURE — 99999 PR PBB SHADOW E&M-EST. PATIENT-LVL V: ICD-10-PCS | Mod: PBBFAC,HCNC,, | Performed by: HOSPITALIST

## 2023-03-30 PROCEDURE — 99215 OFFICE O/P EST HI 40 MIN: CPT | Mod: HCNC,S$GLB,, | Performed by: HOSPITALIST

## 2023-03-30 PROCEDURE — 1159F MED LIST DOCD IN RCRD: CPT | Mod: HCNC,CPTII,S$GLB, | Performed by: HOSPITALIST

## 2023-03-30 PROCEDURE — 3074F PR MOST RECENT SYSTOLIC BLOOD PRESSURE < 130 MM HG: ICD-10-PCS | Mod: HCNC,CPTII,S$GLB, | Performed by: HOSPITALIST

## 2023-03-30 PROCEDURE — 1125F AMNT PAIN NOTED PAIN PRSNT: CPT | Mod: HCNC,CPTII,S$GLB, | Performed by: HOSPITALIST

## 2023-03-30 PROCEDURE — 3288F PR FALLS RISK ASSESSMENT DOCUMENTED: ICD-10-PCS | Mod: HCNC,CPTII,S$GLB, | Performed by: HOSPITALIST

## 2023-03-30 PROCEDURE — 3074F SYST BP LT 130 MM HG: CPT | Mod: HCNC,CPTII,S$GLB, | Performed by: HOSPITALIST

## 2023-03-30 PROCEDURE — 1101F PR PT FALLS ASSESS DOC 0-1 FALLS W/OUT INJ PAST YR: ICD-10-PCS | Mod: HCNC,CPTII,S$GLB, | Performed by: HOSPITALIST

## 2023-03-30 PROCEDURE — 1159F PR MEDICATION LIST DOCUMENTED IN MEDICAL RECORD: ICD-10-PCS | Mod: HCNC,CPTII,S$GLB, | Performed by: HOSPITALIST

## 2023-03-30 PROCEDURE — 3044F PR MOST RECENT HEMOGLOBIN A1C LEVEL <7.0%: ICD-10-PCS | Mod: HCNC,CPTII,S$GLB, | Performed by: HOSPITALIST

## 2023-03-30 PROCEDURE — 99999 PR PBB SHADOW E&M-EST. PATIENT-LVL V: CPT | Mod: PBBFAC,HCNC,, | Performed by: HOSPITALIST

## 2023-03-30 PROCEDURE — 1101F PT FALLS ASSESS-DOCD LE1/YR: CPT | Mod: HCNC,CPTII,S$GLB, | Performed by: HOSPITALIST

## 2023-03-30 PROCEDURE — 3288F FALL RISK ASSESSMENT DOCD: CPT | Mod: HCNC,CPTII,S$GLB, | Performed by: HOSPITALIST

## 2023-03-30 PROCEDURE — 3078F DIAST BP <80 MM HG: CPT | Mod: HCNC,CPTII,S$GLB, | Performed by: HOSPITALIST

## 2023-03-30 RX ORDER — TIOTROPIUM BROMIDE AND OLODATEROL 3.124; 2.736 UG/1; UG/1
1 SPRAY, METERED RESPIRATORY (INHALATION) DAILY
Qty: 1 G | Refills: 5 | Status: SHIPPED | OUTPATIENT
Start: 2023-03-30

## 2023-03-30 NOTE — ASSESSMENT & PLAN NOTE
- based on PFT 8/2022  - has been using albuterol inhaler when needed for dyspnea, at most about twice per week  - wheezing in clinic  - discussed adding Stiolto as a controller to use every day regardless as symptoms  - referral sent to PDM   - pt states not limited by SOB or dyspnea

## 2023-03-30 NOTE — PROGRESS NOTES
Subjective:      Patient ID: Raghavendra Luz is a 72 y.o. male.    Chief Complaint: Follow up     72 year old male with history of CAD, Afib (Eliquis, coreg), PE, ischemic cardiomyopathy (s/p ICD), HTN,  open angle glaucoma, hyperlipidemia,, COPD (PFT 8/2022 FEV1 33.7%, +air trapping) who was last seen by Dr. Regalado 8/2022 where he was treated for COPD exacerbation with albuterol nebulizer, medrol dosepack, and Zpack. Mr. Luz was admitted at [facility] for COPD exacerbation 2/2023- RVP positive for rhino/enterovirus, he was treated on the medical lester with antitussives, nebs and steroids. Was going to rehab and had home PT afterwards and worked up to walking 4 blocks. He feels that his breathing has returned to baseline. Continues to have chronic occasional cough with thick sputum, continues to smoke. Denies fever, chills, unintentional weight loss.    56 pack years, still smoking.     Pulmonary Work Up:  Complete PFT 8/2022- severe obstruction with mild restriction, air-trapping present  Overnight Oximetry 8/2022- No hypoxic events  6MW 8/2022- Walked 10% of predicted, limited by dyspnea and back pain, no hypoxia     Pulmonary Interventions:   Albuterol HFA - using about twice/week  Albuterol Nebulizer- not using as he feels benefit with inhaler    Review of Systems   Respiratory:  Positive for cough, sputum production, wheezing, previous hospitalization due to pulmonary problems and dyspnea on extertion.    Objective:     Physical Exam   Constitutional: He is oriented to person, place, and time. He appears well-developed and well-nourished.   Cardiovascular: Normal rate.   Irregular rhythm    Pulmonary/Chest:   Low pitch wheezing with expiration, normal resp effort at rest on room air, no breathlessness with conversation   Musculoskeletal:         General: No edema.   Neurological: He is alert and oriented to person, place, and time.   Skin: Skin is warm and dry.   Psychiatric: He has a normal mood and affect.    Personal Diagnostic Review  As Above  No flowsheet data found.     Assessment:     No diagnosis found.     Outpatient Encounter Medications as of 3/30/2023   Medication Sig Dispense Refill    albuterol (PROVENTIL) 2.5 mg /3 mL (0.083 %) nebulizer solution Take 3 mLs (2.5 mg total) by nebulization every 4 to 6 hours as needed for Wheezing or Shortness of Breath. 360 mL 11    albuterol (PROVENTIL/VENTOLIN HFA) 90 mcg/actuation inhaler Inhale 2 puffs into the lungs every 4 (four) hours as needed for Wheezing or Shortness of Breath. 18 g 11    aspirin (ECOTRIN) 81 MG EC tablet Take 81 mg by mouth.      atorvastatin (LIPITOR) 80 MG tablet Take 1 tablet by mouth once daily 90 tablet 3    azithromycin (ZITHROMAX Z-COLT) 250 MG tablet 500 mg on day 1 (two tablets) followed by 250 mg once daily on days 2-5 6 tablet 0    bimatoprost (LUMIGAN) 0.01 % Drop Place 1 drop into both eyes every evening. 2.5 mL 11    brimonidine 0.2% (ALPHAGAN) 0.2 % Drop Place 1 drop into both eyes every 12 (twelve) hours. 10 mL 12    carvediloL (COREG) 25 MG tablet TAKE 1 TABLET BY MOUTH TWICE DAILY WITH MEALS 180 tablet 0    ELIQUIS 5 mg Tab Take 1 tablet by mouth twice daily 180 tablet 3    furosemide (LASIX) 40 MG tablet TAKE 1 TABLET BY MOUTH ONCE DAILY AS NEEDED FOR SWELLING 90 tablet 3    lisinopriL (PRINIVIL,ZESTRIL) 40 MG tablet Take 1 tablet by mouth once daily 90 tablet 0    methylPREDNISolone (MEDROL DOSEPACK) 4 mg tablet use as directed 1 each 0    netarsudiL-latanoprost (ROCKLATAN) 0.02-0.005 % Drop Place 1 drop into both eyes every evening. 2.5 mL 12    nitroGLYCERIN (NITROSTAT) 0.4 MG SL tablet PLACE 1 TABLET UNDER THE TONGUE EVERY 5 (FIVE) MINUTES AS NEEDED FOR CHEST PAIN.MAX 3 DOSES IN15 MIN 20 tablet 0    spironolactone (ALDACTONE) 100 MG tablet Take 1 tablet by mouth once daily 90 tablet 0    traZODone (DESYREL) 50 MG tablet Take 1 tablet (50 mg total) by mouth every evening. 90 tablet 3     No facility-administered encounter  medications on file as of 3/30/2023.     No orders of the defined types were placed in this encounter.        Plan:     Problem List Items Addressed This Visit          Ophtho    Primary open angle glaucoma (POAG) of both eyes, severe stage     - Reviewed Ophthalmology note to confirm hx open angle as opposed to closed prior to Stiolto Rx            Pulmonary    Mixed simple and mucopurulent chronic bronchitis    Relevant Medications    tiotropium-olodateroL (STIOLTO RESPIMAT) 2.5-2.5 mcg/actuation Mist    Other Relevant Orders    CT Chest Lung Screening Low Dose    Ambulatory referral/consult to Pulmonology    Stage 3 severe COPD by GOLD classification     - based on PFT 8/2022  - has been using albuterol inhaler when needed for dyspnea, at most about twice per week  - wheezing in clinic  - discussed adding Stiolto as a controller to use every day regardless as symptoms  - referral sent to PDM   - pt states not limited by SOB or dyspnea            Cardiac/Vascular    Essential hypertension     - blood pressure is low in clinic, patient denies light-headedness, dizziness, no worsening of shortness of breath, etc  - instructed to discuss with PCP tomorrow morning at his visit         Permanent atrial fibrillation - Primary     - pt has tried to refill Eliquis, but his [facility] pharmacy is out, has been out for a week or two  - I sent a Rx to [facility] to see if they can fill his script in the meantime, no samples to give in [facility] system         Relevant Medications    apixaban (ELIQUIS) 5 mg Tab       Other    Dependence on nicotine from cigarettes     - Did try to stop smoking after discharge from the hospital in February, used patches, but still had cravings and started smoking again  - smoking about .75ppd now  - counseled on cessation, patient not interested in referral to smoking cessation program at this time  - LDCT ordered, discussed reasons to have and patient is in agreement         Relevant  Orders    CT Chest Lung Screening Low Dose    Ambulatory referral/consult to Pulmonology     Plan discussed with patient and his granddaughter, they expressed understanding, all questions answered. RTC in 6 weeks.

## 2023-03-30 NOTE — ASSESSMENT & PLAN NOTE
- blood pressure is low in clinic, patient denies light-headedness, dizziness, no worsening of shortness of breath, etc  - instructed to discuss with PCP tomorrow morning at his visit

## 2023-03-30 NOTE — ASSESSMENT & PLAN NOTE
- pt has tried to refill Eliquis, but his [facility] pharmacy is out, has been out for a week or two  - I sent a Rx to [facility] to see if they can fill his script in the meantime, no samples to give in [facility] system

## 2023-03-30 NOTE — ASSESSMENT & PLAN NOTE
- Did try to stop smoking after discharge from the hospital in February, used patches, but still had cravings and started smoking again  - smoking about .75ppd now  - counseled on cessation, patient not interested in referral to smoking cessation program at this time  - LDCT ordered, discussed reasons to have and patient is in agreement

## 2023-03-31 ENCOUNTER — OFFICE VISIT (OUTPATIENT)
Dept: INTERNAL MEDICINE | Facility: CLINIC | Age: 73
End: 2023-03-31
Payer: MEDICARE

## 2023-03-31 VITALS
SYSTOLIC BLOOD PRESSURE: 110 MMHG | OXYGEN SATURATION: 98 % | HEART RATE: 82 BPM | BODY MASS INDEX: 26.59 KG/M2 | TEMPERATURE: 98 F | DIASTOLIC BLOOD PRESSURE: 68 MMHG | HEIGHT: 75 IN | RESPIRATION RATE: 18 BRPM | WEIGHT: 213.88 LBS

## 2023-03-31 DIAGNOSIS — Z95.810 ICD (IMPLANTABLE CARDIOVERTER-DEFIBRILLATOR) IN PLACE: ICD-10-CM

## 2023-03-31 DIAGNOSIS — I48.21 PERMANENT ATRIAL FIBRILLATION: ICD-10-CM

## 2023-03-31 DIAGNOSIS — I25.5 ISCHEMIC CARDIOMYOPATHY: ICD-10-CM

## 2023-03-31 DIAGNOSIS — I26.94 MULTIPLE SUBSEGMENTAL PULMONARY EMBOLI WITHOUT ACUTE COR PULMONALE: ICD-10-CM

## 2023-03-31 DIAGNOSIS — I25.10 ATHEROSCLEROSIS OF NATIVE CORONARY ARTERY OF NATIVE HEART WITHOUT ANGINA PECTORIS: ICD-10-CM

## 2023-03-31 DIAGNOSIS — E78.2 MIXED HYPERLIPIDEMIA: ICD-10-CM

## 2023-03-31 DIAGNOSIS — J44.9 STAGE 3 SEVERE COPD BY GOLD CLASSIFICATION: ICD-10-CM

## 2023-03-31 DIAGNOSIS — F17.218 CIGARETTE NICOTINE DEPENDENCE WITH OTHER NICOTINE-INDUCED DISORDER: ICD-10-CM

## 2023-03-31 DIAGNOSIS — E87.1 HYPONATREMIA: ICD-10-CM

## 2023-03-31 DIAGNOSIS — I50.22 CHRONIC SYSTOLIC HEART FAILURE: ICD-10-CM

## 2023-03-31 DIAGNOSIS — J41.8 MIXED SIMPLE AND MUCOPURULENT CHRONIC BRONCHITIS: ICD-10-CM

## 2023-03-31 DIAGNOSIS — F51.01 PRIMARY INSOMNIA: ICD-10-CM

## 2023-03-31 DIAGNOSIS — I10 ESSENTIAL HYPERTENSION: Primary | ICD-10-CM

## 2023-03-31 DIAGNOSIS — M53.86 DECREASED ROM OF LUMBAR SPINE: ICD-10-CM

## 2023-03-31 PROCEDURE — 3074F PR MOST RECENT SYSTOLIC BLOOD PRESSURE < 130 MM HG: ICD-10-PCS | Mod: HCNC,CPTII,S$GLB, | Performed by: PEDIATRICS

## 2023-03-31 PROCEDURE — 1160F RVW MEDS BY RX/DR IN RCRD: CPT | Mod: HCNC,CPTII,S$GLB, | Performed by: PEDIATRICS

## 2023-03-31 PROCEDURE — 3008F PR BODY MASS INDEX (BMI) DOCUMENTED: ICD-10-PCS | Mod: HCNC,CPTII,S$GLB, | Performed by: PEDIATRICS

## 2023-03-31 PROCEDURE — 1101F PR PT FALLS ASSESS DOC 0-1 FALLS W/OUT INJ PAST YR: ICD-10-PCS | Mod: HCNC,CPTII,S$GLB, | Performed by: PEDIATRICS

## 2023-03-31 PROCEDURE — 99214 OFFICE O/P EST MOD 30 MIN: CPT | Mod: HCNC,S$GLB,, | Performed by: PEDIATRICS

## 2023-03-31 PROCEDURE — 3008F BODY MASS INDEX DOCD: CPT | Mod: HCNC,CPTII,S$GLB, | Performed by: PEDIATRICS

## 2023-03-31 PROCEDURE — 99999 PR PBB SHADOW E&M-EST. PATIENT-LVL V: ICD-10-PCS | Mod: PBBFAC,HCNC,, | Performed by: PEDIATRICS

## 2023-03-31 PROCEDURE — 3078F PR MOST RECENT DIASTOLIC BLOOD PRESSURE < 80 MM HG: ICD-10-PCS | Mod: HCNC,CPTII,S$GLB, | Performed by: PEDIATRICS

## 2023-03-31 PROCEDURE — 1159F PR MEDICATION LIST DOCUMENTED IN MEDICAL RECORD: ICD-10-PCS | Mod: HCNC,CPTII,S$GLB, | Performed by: PEDIATRICS

## 2023-03-31 PROCEDURE — 99214 PR OFFICE/OUTPT VISIT, EST, LEVL IV, 30-39 MIN: ICD-10-PCS | Mod: HCNC,S$GLB,, | Performed by: PEDIATRICS

## 2023-03-31 PROCEDURE — 99999 PR PBB SHADOW E&M-EST. PATIENT-LVL V: CPT | Mod: PBBFAC,HCNC,, | Performed by: PEDIATRICS

## 2023-03-31 PROCEDURE — 3074F SYST BP LT 130 MM HG: CPT | Mod: HCNC,CPTII,S$GLB, | Performed by: PEDIATRICS

## 2023-03-31 PROCEDURE — 3288F PR FALLS RISK ASSESSMENT DOCUMENTED: ICD-10-PCS | Mod: HCNC,CPTII,S$GLB, | Performed by: PEDIATRICS

## 2023-03-31 PROCEDURE — 3044F PR MOST RECENT HEMOGLOBIN A1C LEVEL <7.0%: ICD-10-PCS | Mod: HCNC,CPTII,S$GLB, | Performed by: PEDIATRICS

## 2023-03-31 PROCEDURE — 1160F PR REVIEW ALL MEDS BY PRESCRIBER/CLIN PHARMACIST DOCUMENTED: ICD-10-PCS | Mod: HCNC,CPTII,S$GLB, | Performed by: PEDIATRICS

## 2023-03-31 PROCEDURE — 3288F FALL RISK ASSESSMENT DOCD: CPT | Mod: HCNC,CPTII,S$GLB, | Performed by: PEDIATRICS

## 2023-03-31 PROCEDURE — 1101F PT FALLS ASSESS-DOCD LE1/YR: CPT | Mod: HCNC,CPTII,S$GLB, | Performed by: PEDIATRICS

## 2023-03-31 PROCEDURE — 1159F MED LIST DOCD IN RCRD: CPT | Mod: HCNC,CPTII,S$GLB, | Performed by: PEDIATRICS

## 2023-03-31 PROCEDURE — 3078F DIAST BP <80 MM HG: CPT | Mod: HCNC,CPTII,S$GLB, | Performed by: PEDIATRICS

## 2023-03-31 PROCEDURE — 3044F HG A1C LEVEL LT 7.0%: CPT | Mod: HCNC,CPTII,S$GLB, | Performed by: PEDIATRICS

## 2023-03-31 RX ORDER — PREDNISONE 20 MG/1
TABLET ORAL
COMMUNITY
Start: 2023-02-22 | End: 2023-07-20

## 2023-03-31 NOTE — PROGRESS NOTES
Subjective:       Patient ID: Raghavendra Luz is a 72 y.o. male.    Chief Complaint: Follow-up    Here for f/u with granddaughter     1) HTN: controlled. Compliant with medications. no HTNsive Sx  2) COPD: stable per pt. Intermittent sob, sees pulm. Still smoking, trying patch. Has upcoming CT lung cancer screen.  3) Hx PE/AF/CAD/CHF/hx defib implant: was on eliquis/coreg/aldactone/lisinopril/lasix. Has had eliquis supply issues and missed doses this month, today as rx downstairs. Sees Dr Corcoran, No new CP, SOB, palpitations.  4) hyponatremia: doing well, stable , no confusion, likely due to diuretics.  5) LIPIDS:following D&E, tolerating and compliant with med(s).         LABS REVIEWED AND DISCUSSED WITH PATIENT    Review of Systems   Constitutional:  Negative for fever and unexpected weight change.   HENT:  Negative for congestion and rhinorrhea.    Eyes:  Negative for discharge and redness.   Respiratory:  Positive for shortness of breath (stable copd). Negative for cough and wheezing.    Cardiovascular:  Positive for chest pain (rare angina). Negative for palpitations and leg swelling.   Gastrointestinal:  Negative for constipation, diarrhea and vomiting.   Endocrine: Negative for cold intolerance, heat intolerance, polydipsia, polyphagia and polyuria.   Genitourinary:  Negative for decreased urine volume and difficulty urinating.   Musculoskeletal:  Positive for back pain (was in pt and he felt helped. Got up to 4 blocks walking. PT , feels he is backsliding.) and gait problem. Negative for arthralgias and joint swelling.   Skin:  Negative for rash and wound.   Neurological:  Negative for syncope and headaches.   Psychiatric/Behavioral:  Negative for behavioral problems and sleep disturbance (quiet).      Objective:      Physical Exam  Vitals and nursing note reviewed.   Constitutional:       General: He is not in acute distress.     Appearance: He is well-developed.   Neck:      Thyroid: No thyromegaly.       Vascular: No JVD.   Cardiovascular:      Rate and Rhythm: Normal rate and regular rhythm.      Heart sounds: Normal heart sounds. No murmur heard.  Pulmonary:      Effort: Pulmonary effort is normal. No respiratory distress.      Breath sounds: Normal breath sounds. No wheezing or rales.   Abdominal:      General: There is no distension.      Palpations: Abdomen is soft. There is no mass.      Tenderness: There is no abdominal tenderness. There is no guarding.   Musculoskeletal:      Right lower leg: No edema.      Left lower leg: No edema.      Comments: Moves slowly and stiffly due to back.    Lymphadenopathy:      Cervical: No cervical adenopathy.   Skin:     Capillary Refill: Capillary refill takes less than 2 seconds.      Findings: No rash.      Comments: Thickened toenails   Neurological:      General: No focal deficit present.      Mental Status: He is alert and oriented to person, place, and time.      Cranial Nerves: No cranial nerve deficit.      Coordination: Coordination normal.      Gait: Gait abnormal (shuffling).   Psychiatric:         Mood and Affect: Mood normal.         Behavior: Behavior normal.         Thought Content: Thought content normal.         Judgment: Judgment normal.       Assessment:       1. Essential hypertension    2. Ischemic cardiomyopathy    3. ICD (implantable cardioverter-defibrillator) in place    4. Mixed hyperlipidemia    5. Mixed simple and mucopurulent chronic bronchitis    6. Multiple subsegmental pulmonary emboli without acute cor pulmonale    7. Permanent atrial fibrillation    8. Stage 3 severe COPD by GOLD classification    9. Decreased ROM of lumbar spine    10. Atherosclerosis of native coronary artery of native heart without angina pectoris    11. Chronic systolic heart failure    12. Cigarette nicotine dependence with other nicotine-induced disorder    13. Hyponatremia    14. Primary insomnia          Plan:       Essential hypertension    Ischemic  cardiomyopathy    ICD (implantable cardioverter-defibrillator) in place    Mixed hyperlipidemia  -     Lipid Panel; Future; Expected date: 03/31/2023  -     Comprehensive Metabolic Panel; Future; Expected date: 03/31/2023    Mixed simple and mucopurulent chronic bronchitis    Multiple subsegmental pulmonary emboli without acute cor pulmonale    Permanent atrial fibrillation    Stage 3 severe COPD by GOLD classification    Decreased ROM of lumbar spine  -     Ambulatory referral/consult to Physical/Occupational Therapy; Future; Expected date: 04/07/2023    Atherosclerosis of native coronary artery of native heart without angina pectoris    Chronic systolic heart failure    Cigarette nicotine dependence with other nicotine-induced disorder    Hyponatremia    Primary insomnia    Overall he is stable. Maintain meds and subspecialty care. D&E, weight reduction  of few pounds. Stop smoking. Shingles vaccine d/wpt, is uptodate opn PCV. F/U 6 months with labs. Toenail care d/w pt. Relative CI for oral lamasil due to meds. Jublia expensive. Try BID indefinite wilfred's salve to nails and daily filling. See podiatry if worsens.

## 2023-04-03 ENCOUNTER — CLINICAL SUPPORT (OUTPATIENT)
Dept: REHABILITATION | Facility: HOSPITAL | Age: 73
End: 2023-04-03
Attending: PEDIATRICS
Payer: MEDICARE

## 2023-04-03 DIAGNOSIS — M53.86 DECREASED ROM OF LUMBAR SPINE: ICD-10-CM

## 2023-04-03 PROBLEM — R29.898 WEAKNESS OF BOTH HIPS: Status: RESOLVED | Noted: 2022-10-24 | Resolved: 2023-04-03

## 2023-04-03 NOTE — PROGRESS NOTES
Upon entry into the clinic the patient voiced that he had been receiving Home Health services as of about a month ago and was told that he would need to wait until the next quarter before scheduling more visits. Due to comorbidities and progressively losing vision he is unable to access the clinic himself safely without assistance from family members. Patient is requesting continuing home health services as compared to outpatient services due to his inability to safely access the clinic, which in my professional opinion is appropriate for him at this time. This appointment was scheduled and will be reaching out to provider for Home Health referral to continue with services in an environment more conducive to him.    Marcella Tovar, PT, DPT

## 2023-04-04 ENCOUNTER — TELEPHONE (OUTPATIENT)
Dept: INTERNAL MEDICINE | Facility: CLINIC | Age: 73
End: 2023-04-04
Payer: MEDICARE

## 2023-04-04 DIAGNOSIS — M54.50 LUMBAR BACK PAIN: Primary | ICD-10-CM

## 2023-04-04 DIAGNOSIS — I26.94 MULTIPLE SUBSEGMENTAL PULMONARY EMBOLI WITHOUT ACUTE COR PULMONALE: ICD-10-CM

## 2023-04-04 DIAGNOSIS — I25.5 ISCHEMIC CARDIOMYOPATHY: ICD-10-CM

## 2023-04-04 DIAGNOSIS — J44.9 STAGE 3 SEVERE COPD BY GOLD CLASSIFICATION: ICD-10-CM

## 2023-04-04 NOTE — TELEPHONE ENCOUNTER
----- Message from Alejandrina Gibbons sent at 4/3/2023 12:08 PM CDT -----  Regarding: PT ORDER  Good afternoon,     The patient attached has orders in for outpatient physical therapy. The patient requested for a home health order instead of outpatient due to his existing conditions. We were advised by an OP physical therapist that home health PT would be more appropriate as he is going blind and has many underlying health conditions/relies of family to bring him to any doctors appointment. I am cancelling the outpatient order.     Thank you,    Alejandrina MATT  Provider   ________________________________________                                                                                           [facility] [place], [place] [address]  [email or web address]  o [phone]  f [phone]

## 2023-04-05 PROCEDURE — G0180 MD CERTIFICATION HHA PATIENT: HCPCS | Mod: ,,, | Performed by: PEDIATRICS

## 2023-04-05 PROCEDURE — G0180 PR HOME HEALTH MD CERTIFICATION: ICD-10-PCS | Mod: ,,, | Performed by: PEDIATRICS

## 2023-05-12 ENCOUNTER — EXTERNAL HOME HEALTH (OUTPATIENT)
Dept: HOME HEALTH SERVICES | Facility: HOSPITAL | Age: 73
End: 2023-05-12
Payer: MEDICARE

## 2023-05-18 DIAGNOSIS — I48.21 PERMANENT ATRIAL FIBRILLATION: ICD-10-CM

## 2023-05-19 ENCOUNTER — DOCUMENT SCAN (OUTPATIENT)
Dept: HOME HEALTH SERVICES | Facility: HOSPITAL | Age: 73
End: 2023-05-19
Payer: MEDICARE

## 2023-06-01 ENCOUNTER — OFFICE VISIT (OUTPATIENT)
Dept: PULMONOLOGY | Facility: CLINIC | Age: 73
End: 2023-06-01
Payer: MEDICARE

## 2023-06-01 ENCOUNTER — HOSPITAL ENCOUNTER (OUTPATIENT)
Dept: RADIOLOGY | Facility: HOSPITAL | Age: 73
Discharge: HOME | End: 2023-06-01
Attending: HOSPITALIST
Payer: MEDICARE

## 2023-06-01 VITALS
HEART RATE: 60 BPM | WEIGHT: 216.94 LBS | BODY MASS INDEX: 26.97 KG/M2 | OXYGEN SATURATION: 98 % | SYSTOLIC BLOOD PRESSURE: 102 MMHG | HEIGHT: 75 IN | DIASTOLIC BLOOD PRESSURE: 70 MMHG | RESPIRATION RATE: 15 BRPM

## 2023-06-01 DIAGNOSIS — J41.8 MIXED SIMPLE AND MUCOPURULENT CHRONIC BRONCHITIS: ICD-10-CM

## 2023-06-01 DIAGNOSIS — F17.218 CIGARETTE NICOTINE DEPENDENCE WITH OTHER NICOTINE-INDUCED DISORDER: ICD-10-CM

## 2023-06-01 DIAGNOSIS — I25.3 ANEURYSM OF LEFT VENTRICLE OF HEART: ICD-10-CM

## 2023-06-01 DIAGNOSIS — J44.9 STAGE 3 SEVERE COPD BY GOLD CLASSIFICATION: ICD-10-CM

## 2023-06-01 PROCEDURE — 3044F PR MOST RECENT HEMOGLOBIN A1C LEVEL <7.0%: ICD-10-PCS | Mod: HCNC,CPTII,S$GLB, | Performed by: HOSPITALIST

## 2023-06-01 PROCEDURE — 3078F PR MOST RECENT DIASTOLIC BLOOD PRESSURE < 80 MM HG: ICD-10-PCS | Mod: HCNC,CPTII,S$GLB, | Performed by: HOSPITALIST

## 2023-06-01 PROCEDURE — 71271 CT THORAX LUNG CANCER SCR C-: CPT | Mod: TC,HCNC

## 2023-06-01 PROCEDURE — 71271 CT THORAX LUNG CANCER SCR C-: CPT | Mod: 26,HCNC,, | Performed by: RADIOLOGY

## 2023-06-01 PROCEDURE — 3288F FALL RISK ASSESSMENT DOCD: CPT | Mod: HCNC,CPTII,S$GLB, | Performed by: HOSPITALIST

## 2023-06-01 PROCEDURE — 99213 PR OFFICE/OUTPT VISIT, EST, LEVL III, 20-29 MIN: ICD-10-PCS | Mod: HCNC,25,S$GLB, | Performed by: HOSPITALIST

## 2023-06-01 PROCEDURE — 3078F DIAST BP <80 MM HG: CPT | Mod: HCNC,CPTII,S$GLB, | Performed by: HOSPITALIST

## 2023-06-01 PROCEDURE — 3008F BODY MASS INDEX DOCD: CPT | Mod: HCNC,CPTII,S$GLB, | Performed by: HOSPITALIST

## 2023-06-01 PROCEDURE — 1160F PR REVIEW ALL MEDS BY PRESCRIBER/CLIN PHARMACIST DOCUMENTED: ICD-10-PCS | Mod: HCNC,CPTII,S$GLB, | Performed by: HOSPITALIST

## 2023-06-01 PROCEDURE — 3288F PR FALLS RISK ASSESSMENT DOCUMENTED: ICD-10-PCS | Mod: HCNC,CPTII,S$GLB, | Performed by: HOSPITALIST

## 2023-06-01 PROCEDURE — 1100F PTFALLS ASSESS-DOCD GE2>/YR: CPT | Mod: HCNC,CPTII,S$GLB, | Performed by: HOSPITALIST

## 2023-06-01 PROCEDURE — 94640 PR INHAL RX, AIRWAY OBST/DX SPUTUM INDUCT: ICD-10-PCS | Mod: HCNC,S$GLB,, | Performed by: HOSPITALIST

## 2023-06-01 PROCEDURE — 1125F AMNT PAIN NOTED PAIN PRSNT: CPT | Mod: HCNC,CPTII,S$GLB, | Performed by: HOSPITALIST

## 2023-06-01 PROCEDURE — 71271 CT CHEST LUNG SCREENING LOW DOSE: ICD-10-PCS | Mod: 26,HCNC,, | Performed by: RADIOLOGY

## 2023-06-01 PROCEDURE — 94640 AIRWAY INHALATION TREATMENT: CPT | Mod: HCNC,S$GLB,, | Performed by: HOSPITALIST

## 2023-06-01 PROCEDURE — 1125F PR PAIN SEVERITY QUANTIFIED, PAIN PRESENT: ICD-10-PCS | Mod: HCNC,CPTII,S$GLB, | Performed by: HOSPITALIST

## 2023-06-01 PROCEDURE — 3044F HG A1C LEVEL LT 7.0%: CPT | Mod: HCNC,CPTII,S$GLB, | Performed by: HOSPITALIST

## 2023-06-01 PROCEDURE — 1159F PR MEDICATION LIST DOCUMENTED IN MEDICAL RECORD: ICD-10-PCS | Mod: HCNC,CPTII,S$GLB, | Performed by: HOSPITALIST

## 2023-06-01 PROCEDURE — 99999 PR PBB SHADOW E&M-EST. PATIENT-LVL V: ICD-10-PCS | Mod: PBBFAC,HCNC,, | Performed by: HOSPITALIST

## 2023-06-01 PROCEDURE — 3008F PR BODY MASS INDEX (BMI) DOCUMENTED: ICD-10-PCS | Mod: HCNC,CPTII,S$GLB, | Performed by: HOSPITALIST

## 2023-06-01 PROCEDURE — 1160F RVW MEDS BY RX/DR IN RCRD: CPT | Mod: HCNC,CPTII,S$GLB, | Performed by: HOSPITALIST

## 2023-06-01 PROCEDURE — 3074F SYST BP LT 130 MM HG: CPT | Mod: HCNC,CPTII,S$GLB, | Performed by: HOSPITALIST

## 2023-06-01 PROCEDURE — 1159F MED LIST DOCD IN RCRD: CPT | Mod: HCNC,CPTII,S$GLB, | Performed by: HOSPITALIST

## 2023-06-01 PROCEDURE — 3074F PR MOST RECENT SYSTOLIC BLOOD PRESSURE < 130 MM HG: ICD-10-PCS | Mod: HCNC,CPTII,S$GLB, | Performed by: HOSPITALIST

## 2023-06-01 PROCEDURE — 99213 OFFICE O/P EST LOW 20 MIN: CPT | Mod: HCNC,25,S$GLB, | Performed by: HOSPITALIST

## 2023-06-01 PROCEDURE — 1100F PR PT FALLS ASSESS DOC 2+ FALLS/FALL W/INJURY/YR: ICD-10-PCS | Mod: HCNC,CPTII,S$GLB, | Performed by: HOSPITALIST

## 2023-06-01 PROCEDURE — 99999 PR PBB SHADOW E&M-EST. PATIENT-LVL V: CPT | Mod: PBBFAC,HCNC,, | Performed by: HOSPITALIST

## 2023-06-01 RX ORDER — IPRATROPIUM BROMIDE AND ALBUTEROL SULFATE 2.5; .5 MG/3ML; MG/3ML
3 SOLUTION RESPIRATORY (INHALATION)
Status: COMPLETED | OUTPATIENT
Start: 2023-06-01 | End: 2023-06-01

## 2023-06-01 RX ORDER — PANTOPRAZOLE SODIUM 40 MG/1
TABLET, DELAYED RELEASE ORAL
COMMUNITY
Start: 2023-02-22

## 2023-06-01 RX ADMIN — IPRATROPIUM BROMIDE AND ALBUTEROL SULFATE 3 ML: 2.5; .5 SOLUTION RESPIRATORY (INHALATION) at 12:06

## 2023-06-01 NOTE — ASSESSMENT & PLAN NOTE
- Mr. Luz has been trying to avoid taking his medications, has developed different adaptations when he does become short of breath  - using albuterol up to 2 times per day  - wheezing today on exam  - no particular worsening of symptoms  - encouraged to  Stiolto and use  - duoneb tx given in clinic

## 2023-06-01 NOTE — ASSESSMENT & PLAN NOTE
- as noted on LDCT 6/2023, no previous diagnosis on chart review  - ECHO 2021 does not mention, but does report akinesis of apex  - NM perfusion scan 2018 with large, severe apical perfusion defect at rest  - discussed with patient's daughter (report resulted after pt appointment), they would like to transfer cardiology care to the [facility] system. Referral placed.

## 2023-06-01 NOTE — PROGRESS NOTES
Subjective:      Patient ID: Raghavendra Luz is a 73 y.o. male.    Chief Complaint: COPD    Interval Hx 6/2023:    Mr. Luz presents to Pulmonary clinic for follow up COPD. He was last seen 3/2023- at that visit he was one month out from hospital admission for COPD exacerbation and felt that his pulm status had returned to baseline, he was prescribed Stiolto to use in addition to Albuterol, and further work up ordered in the way of LDCT.    Interval Testing:  LDCT 6/2023- Lung rads 2. Noted LV aneurysm    Pulmonary Interventions:  Albuterol HFA- Using once per day  Albuterol Nebulized- Not using  Stiolto- never picked up, feels like he has been able to control symptoms with breathing techniques and occasional albuterol use    Still smoking between .75 and one pack per day    HPI 3/30/23:    72 year old male with history of CAD, Afib (Eliquis, coreg), PE, ischemic cardiomyopathy (s/p ICD), HTN,  open angle glaucoma, hyperlipidemia,, COPD (PFT 8/2022 FEV1 33.7%, +air trapping) who was last seen by Dr. Regalado 8/2022 where he was treated for COPD exacerbation with albuterol nebulizer, medrol dosepack, and Zpack. Mr. Luz was admitted at [facility] for COPD exacerbation 2/2023- RVP positive for rhino/enterovirus, he was treated on the medical lester with antitussives, nebs and steroids. Was going to rehab and had home PT afterwards and worked up to walking 4 blocks. He feels that his breathing has returned to baseline. Continues to have chronic occasional cough with thick sputum, continues to smoke. Denies fever, chills, unintentional weight loss.     56 pack years, still smoking.      Pulmonary Work Up:  Complete PFT 8/2022- severe obstruction with mild restriction, air-trapping present  Overnight Oximetry 8/2022- No hypoxic events  6MW 8/2022- Walked 10% of predicted, limited by dyspnea and back pain, no hypoxia      Pulmonary Interventions:   Albuterol HFA - using about twice/week  Albuterol Nebulizer- not using as he feels  benefit with inhaler    Review of Systems   Respiratory:  Positive for cough, shortness of breath, wheezing and dyspnea on extertion.    Objective:     Physical Exam   Constitutional: He is oriented to person, place, and time. He appears well-developed and well-nourished. He is not obese.   Cardiovascular: Regular rhythm.   Irregular rate   Pulmonary/Chest:   Expiratory wheezing bilateral bases   Musculoskeletal:         General: No edema.   Neurological: He is alert and oriented to person, place, and time.   Skin: Skin is warm and dry.   Psychiatric: He has a normal mood and affect.   Personal Diagnostic Review  As Above  No flowsheet data found.     Assessment:     1. Mixed simple and mucopurulent chronic bronchitis    2. Cigarette nicotine dependence with other nicotine-induced disorder         Outpatient Encounter Medications as of 6/1/2023   Medication Sig Dispense Refill    albuterol (PROVENTIL) 2.5 mg /3 mL (0.083 %) nebulizer solution Take 3 mLs (2.5 mg total) by nebulization every 4 to 6 hours as needed for Wheezing or Shortness of Breath. 360 mL 11    albuterol (PROVENTIL/VENTOLIN HFA) 90 mcg/actuation inhaler Inhale 2 puffs into the lungs every 4 (four) hours as needed for Wheezing or Shortness of Breath. 18 g 11    apixaban (ELIQUIS) 5 mg Tab Take 1 tablet (5 mg total) by mouth 2 (two) times daily. 60 tablet 0    aspirin (ECOTRIN) 81 MG EC tablet Take 81 mg by mouth.      atorvastatin (LIPITOR) 80 MG tablet Take 1 tablet by mouth once daily 90 tablet 3    bimatoprost (LUMIGAN) 0.01 % Drop Place 1 drop into both eyes every evening. 2.5 mL 11    brimonidine 0.2% (ALPHAGAN) 0.2 % Drop Place 1 drop into both eyes every 12 (twelve) hours. 10 mL 12    carvediloL (COREG) 25 MG tablet TAKE 1 TABLET BY MOUTH TWICE DAILY WITH MEALS 180 tablet 0    ELIQUIS 5 mg Tab Take 1 tablet by mouth twice daily (Patient not taking: Reported on 3/31/2023) 180 tablet 3    furosemide (LASIX) 40 MG tablet TAKE 1 TABLET BY MOUTH  ONCE DAILY AS NEEDED FOR SWELLING 90 tablet 3    lisinopriL (PRINIVIL,ZESTRIL) 40 MG tablet Take 1 tablet by mouth once daily 90 tablet 0    netarsudiL-latanoprost (ROCKLATAN) 0.02-0.005 % Drop Place 1 drop into both eyes every evening. 2.5 mL 12    nitroGLYCERIN (NITROSTAT) 0.4 MG SL tablet PLACE 1 TABLET UNDER THE TONGUE EVERY 5 (FIVE) MINUTES AS NEEDED FOR CHEST PAIN.MAX 3 DOSES IN15 MIN 20 tablet 0    predniSONE (DELTASONE) 20 MG tablet       spironolactone (ALDACTONE) 100 MG tablet Take 1 tablet by mouth once daily 90 tablet 0    tiotropium-olodateroL (STIOLTO RESPIMAT) 2.5-2.5 mcg/actuation Mist Inhale 1 puff into the lungs once daily. Controller 1 g 5     No facility-administered encounter medications on file as of 6/1/2023.     No orders of the defined types were placed in this encounter.        Plan:     Problem List Items Addressed This Visit          Pulmonary    Mixed simple and mucopurulent chronic bronchitis    Relevant Medications    albuterol-ipratropium 2.5 mg-0.5 mg/3 mL nebulizer solution 3 mL (Completed)    Stage 3 severe COPD by GOLD classification     - Mr. Luz has been trying to avoid taking his medications, has developed different adaptations when he does become short of breath  - using albuterol up to 2 times per day  - wheezing today on exam  - no particular worsening of symptoms  - encouraged to  Stiolto and use  - duoneb tx given in clinic            Cardiac/Vascular    Aneurysm of left ventricle of heart     - as noted on LDCT 6/2023, no previous diagnosis on chart review  - ECHO 2021 does not mention, but does report akinesis of apex  - NM perfusion scan 2018 with large, severe apical perfusion defect at rest  - discussed with patient's daughter (report resulted after pt appointment), they would like to transfer cardiology care to the [facility]. Referral placed.           Relevant Orders    Ambulatory referral/consult to Cardiology       Other    Dependence on nicotine  from cigarettes     - smoking .75 to one pack per day          Plan discussed with patient, he expressed understanding. RTC 3 months.

## 2023-06-05 ENCOUNTER — HOSPITAL ENCOUNTER (OUTPATIENT)
Dept: CARDIOLOGY | Facility: HOSPITAL | Age: 73
Discharge: HOME | End: 2023-06-05
Attending: INTERNAL MEDICINE
Payer: MEDICARE

## 2023-06-05 ENCOUNTER — OFFICE VISIT (OUTPATIENT)
Dept: CARDIOLOGY | Facility: CLINIC | Age: 73
End: 2023-06-05
Payer: MEDICARE

## 2023-06-05 VITALS
SYSTOLIC BLOOD PRESSURE: 95 MMHG | OXYGEN SATURATION: 96 % | BODY MASS INDEX: 26.81 KG/M2 | DIASTOLIC BLOOD PRESSURE: 70 MMHG | BODY MASS INDEX: 26.81 KG/M2 | WEIGHT: 214.5 LBS | WEIGHT: 214.5 LBS | HEART RATE: 67 BPM

## 2023-06-05 DIAGNOSIS — F17.218 CIGARETTE NICOTINE DEPENDENCE WITH OTHER NICOTINE-INDUCED DISORDER: ICD-10-CM

## 2023-06-05 DIAGNOSIS — J44.9 STAGE 3 SEVERE COPD BY GOLD CLASSIFICATION: ICD-10-CM

## 2023-06-05 DIAGNOSIS — I10 PRIMARY HYPERTENSION: Primary | ICD-10-CM

## 2023-06-05 DIAGNOSIS — I25.3 ANEURYSM OF LEFT VENTRICLE OF HEART: ICD-10-CM

## 2023-06-05 DIAGNOSIS — I25.10 ATHEROSCLEROSIS OF NATIVE CORONARY ARTERY OF NATIVE HEART WITHOUT ANGINA PECTORIS: ICD-10-CM

## 2023-06-05 DIAGNOSIS — I50.22 CHRONIC SYSTOLIC HEART FAILURE: ICD-10-CM

## 2023-06-05 DIAGNOSIS — I48.21 PERMANENT ATRIAL FIBRILLATION: ICD-10-CM

## 2023-06-05 DIAGNOSIS — I10 PRIMARY HYPERTENSION: ICD-10-CM

## 2023-06-05 DIAGNOSIS — Z95.810 ICD (IMPLANTABLE CARDIOVERTER-DEFIBRILLATOR) IN PLACE: Primary | ICD-10-CM

## 2023-06-05 PROCEDURE — 3288F PR FALLS RISK ASSESSMENT DOCUMENTED: ICD-10-PCS | Mod: HCNC,CPTII,S$GLB, | Performed by: INTERNAL MEDICINE

## 2023-06-05 PROCEDURE — 3044F HG A1C LEVEL LT 7.0%: CPT | Mod: HCNC,CPTII,S$GLB, | Performed by: INTERNAL MEDICINE

## 2023-06-05 PROCEDURE — 3078F DIAST BP <80 MM HG: CPT | Mod: HCNC,CPTII,S$GLB, | Performed by: INTERNAL MEDICINE

## 2023-06-05 PROCEDURE — 3044F PR MOST RECENT HEMOGLOBIN A1C LEVEL <7.0%: ICD-10-PCS | Mod: HCNC,CPTII,S$GLB, | Performed by: INTERNAL MEDICINE

## 2023-06-05 PROCEDURE — 1159F MED LIST DOCD IN RCRD: CPT | Mod: HCNC,CPTII,S$GLB, | Performed by: INTERNAL MEDICINE

## 2023-06-05 PROCEDURE — 93010 ELECTROCARDIOGRAM REPORT: CPT | Mod: HCNC,,, | Performed by: STUDENT IN AN ORGANIZED HEALTH CARE EDUCATION/TRAINING PROGRAM

## 2023-06-05 PROCEDURE — 3288F FALL RISK ASSESSMENT DOCD: CPT | Mod: HCNC,CPTII,S$GLB, | Performed by: INTERNAL MEDICINE

## 2023-06-05 PROCEDURE — 3074F SYST BP LT 130 MM HG: CPT | Mod: HCNC,CPTII,S$GLB, | Performed by: INTERNAL MEDICINE

## 2023-06-05 PROCEDURE — 93005 ELECTROCARDIOGRAM TRACING: CPT | Mod: HCNC

## 2023-06-05 PROCEDURE — 1101F PR PT FALLS ASSESS DOC 0-1 FALLS W/OUT INJ PAST YR: ICD-10-PCS | Mod: HCNC,CPTII,S$GLB, | Performed by: INTERNAL MEDICINE

## 2023-06-05 PROCEDURE — 99204 OFFICE O/P NEW MOD 45 MIN: CPT | Mod: HCNC,S$GLB,, | Performed by: INTERNAL MEDICINE

## 2023-06-05 PROCEDURE — 3074F PR MOST RECENT SYSTOLIC BLOOD PRESSURE < 130 MM HG: ICD-10-PCS | Mod: HCNC,CPTII,S$GLB, | Performed by: INTERNAL MEDICINE

## 2023-06-05 PROCEDURE — 1126F PR PAIN SEVERITY QUANTIFIED, NO PAIN PRESENT: ICD-10-PCS | Mod: HCNC,CPTII,S$GLB, | Performed by: INTERNAL MEDICINE

## 2023-06-05 PROCEDURE — 1159F PR MEDICATION LIST DOCUMENTED IN MEDICAL RECORD: ICD-10-PCS | Mod: HCNC,CPTII,S$GLB, | Performed by: INTERNAL MEDICINE

## 2023-06-05 PROCEDURE — 93010 EKG 12-LEAD: ICD-10-PCS | Mod: HCNC,,, | Performed by: STUDENT IN AN ORGANIZED HEALTH CARE EDUCATION/TRAINING PROGRAM

## 2023-06-05 PROCEDURE — 1126F AMNT PAIN NOTED NONE PRSNT: CPT | Mod: HCNC,CPTII,S$GLB, | Performed by: INTERNAL MEDICINE

## 2023-06-05 PROCEDURE — 99999 PR PBB SHADOW E&M-EST. PATIENT-LVL III: CPT | Mod: PBBFAC,HCNC,, | Performed by: INTERNAL MEDICINE

## 2023-06-05 PROCEDURE — 3008F BODY MASS INDEX DOCD: CPT | Mod: HCNC,CPTII,S$GLB, | Performed by: INTERNAL MEDICINE

## 2023-06-05 PROCEDURE — 3078F PR MOST RECENT DIASTOLIC BLOOD PRESSURE < 80 MM HG: ICD-10-PCS | Mod: HCNC,CPTII,S$GLB, | Performed by: INTERNAL MEDICINE

## 2023-06-05 PROCEDURE — 1101F PT FALLS ASSESS-DOCD LE1/YR: CPT | Mod: HCNC,CPTII,S$GLB, | Performed by: INTERNAL MEDICINE

## 2023-06-05 PROCEDURE — 3008F PR BODY MASS INDEX (BMI) DOCUMENTED: ICD-10-PCS | Mod: HCNC,CPTII,S$GLB, | Performed by: INTERNAL MEDICINE

## 2023-06-05 PROCEDURE — 99204 PR OFFICE/OUTPT VISIT, NEW, LEVL IV, 45-59 MIN: ICD-10-PCS | Mod: HCNC,S$GLB,, | Performed by: INTERNAL MEDICINE

## 2023-06-05 PROCEDURE — 99999 PR PBB SHADOW E&M-EST. PATIENT-LVL III: ICD-10-PCS | Mod: PBBFAC,HCNC,, | Performed by: INTERNAL MEDICINE

## 2023-06-05 NOTE — PROGRESS NOTES
Subjective:   Patient ID:  Raghavendra Luz is a 73 y.o. male who presents for evaluation of No chief complaint on file.      HPI  73-year-old male with extensive past medical history   He had an MI 2011 with late presentation of an occluded LAD.    History of RCA stent.    As per outpatient records his angiogram in 2014 showed stable patent RCA stent and occluded LAD.    He had an ICD defibrillator he states that he had a generator change a year ago.    He denies any dyspnea, chest pain, lower extremity swelling.  Syncope or presyncope   No palpitations.    No arrhythmias .  No defibrillator shocks.    Found recently on a CT scan of the lungs to have left ventricular aneurysm.        Past Medical History:   Diagnosis Date    Asthma     Cataract     CHF (congestive heart failure)     COPD (chronic obstructive pulmonary disease)     GERD (gastroesophageal reflux disease)     Glaucoma     Hypertension        Past Surgical History:   Procedure Laterality Date    ANKLE FRACTURE SURGERY Left     COLONOSCOPY      COLONOSCOPY N/A 5/18/2022    Procedure: COLONOSCOPY;  Surgeon: Sandra Blake MD;  Location: [facility] ENDO;  Service: Gastroenterology;  Laterality: N/A;    COLONOSCOPY N/A 9/7/2022    Procedure: COLONOSCOPY;  Surgeon: Sandra Blake MD;  Location: [facility] ENDO;  Service: Gastroenterology;  Laterality: N/A;    CORONARY ANGIOPLASTY WITH STENT PLACEMENT         Social History     Tobacco Use    Smoking status: Every Day     Packs/day: 1.00     Years: 54.00     Pack years: 54.00     Types: Cigarettes     Start date: 1/1/1970    Smokeless tobacco: Never   Substance Use Topics    Alcohol use: Yes     Alcohol/week: 6.0 standard drinks     Types: 6 Cans of beer per week     Comment: six pack daily    Drug use: Never       Family History   Problem Relation Age of Onset    Heart disease Mother     Ovarian cancer Mother     Diabetes Father     Heart disease Father     Brain Hemorrhage Sister     Heart disease Brother         Review of Systems   Cardiovascular:  Negative for chest pain, dyspnea on exertion, palpitations and syncope.   Genitourinary: Negative.    Neurological: Negative.      Current Outpatient Medications on File Prior to Visit   Medication Sig    albuterol (PROVENTIL) 2.5 mg /3 mL (0.083 %) nebulizer solution Take 3 mLs (2.5 mg total) by nebulization every 4 to 6 hours as needed for Wheezing or Shortness of Breath.    albuterol (PROVENTIL/VENTOLIN HFA) 90 mcg/actuation inhaler Inhale 2 puffs into the lungs every 4 (four) hours as needed for Wheezing or Shortness of Breath.    apixaban (ELIQUIS) 5 mg Tab Take 1 tablet (5 mg total) by mouth 2 (two) times daily.    aspirin (ECOTRIN) 81 MG EC tablet Take 81 mg by mouth.    atorvastatin (LIPITOR) 80 MG tablet Take 1 tablet by mouth once daily    bimatoprost (LUMIGAN) 0.01 % Drop Place 1 drop into both eyes every evening.    carvediloL (COREG) 25 MG tablet TAKE 1 TABLET BY MOUTH TWICE DAILY WITH MEALS    ELIQUIS 5 mg Tab Take 1 tablet by mouth twice daily    furosemide (LASIX) 40 MG tablet TAKE 1 TABLET BY MOUTH ONCE DAILY AS NEEDED FOR SWELLING    lisinopriL (PRINIVIL,ZESTRIL) 40 MG tablet Take 1 tablet by mouth once daily    netarsudiL-latanoprost (ROCKLATAN) 0.02-0.005 % Drop Place 1 drop into both eyes every evening.    nitroGLYCERIN (NITROSTAT) 0.4 MG SL tablet PLACE 1 TABLET UNDER THE TONGUE EVERY 5 (FIVE) MINUTES AS NEEDED FOR CHEST PAIN.MAX 3 DOSES IN15 MIN    pantoprazole (PROTONIX) 40 MG tablet     predniSONE (DELTASONE) 20 MG tablet     spironolactone (ALDACTONE) 100 MG tablet Take 1 tablet by mouth once daily    tiotropium-olodateroL (STIOLTO RESPIMAT) 2.5-2.5 mcg/actuation Mist Inhale 1 puff into the lungs once daily. Controller    brimonidine 0.2% (ALPHAGAN) 0.2 % Drop Place 1 drop into both eyes every 12 (twelve) hours.     No current facility-administered medications on file prior to visit.       Objective:   Objective:  Wt Readings from Last 3  Encounters:   06/05/23 97.3 kg (214 lb 8.1 oz)   06/05/23 97.3 kg (214 lb 8.1 oz)   06/01/23 98.4 kg (216 lb 14.9 oz)     Temp Readings from Last 3 Encounters:   03/31/23 98 °F (36.7 °C) (Tympanic)   09/30/22 97.2 °F (36.2 °C)   09/07/22 97.9 °F (36.6 °C)     BP Readings from Last 3 Encounters:   06/05/23 95/70   06/01/23 102/70   03/31/23 110/68     Pulse Readings from Last 3 Encounters:   06/05/23 67   06/01/23 60   03/31/23 82       Physical Exam  Vitals reviewed.   Constitutional:       Appearance: He is well-developed.   Neck:      Vascular: No carotid bruit.   Cardiovascular:      Rate and Rhythm: Normal rate and regular rhythm.      Pulses: Intact distal pulses.      Heart sounds: Normal heart sounds. No murmur heard.  Pulmonary:      Breath sounds: Normal breath sounds.   Neurological:      Mental Status: He is oriented to person, place, and time.       Lab Results   Component Value Date    CHOL 122 03/22/2023    CHOL 88 (L) 10/03/2022    CHOL 142 03/08/2022     Lab Results   Component Value Date    HDL 38 (L) 03/22/2023    HDL 33 (L) 10/03/2022    HDL 38 (L) 03/08/2022     Lab Results   Component Value Date    LDLCALC 53.2 (L) 03/22/2023    LDLCALC 39.4 (L) 10/03/2022    LDLCALC 84.8 03/08/2022     Lab Results   Component Value Date    TRIG 154 (H) 03/22/2023    TRIG 78 10/03/2022    TRIG 96 03/08/2022     Lab Results   Component Value Date    CHOLHDL 31.1 03/22/2023    CHOLHDL 37.5 10/03/2022    CHOLHDL 26.8 03/08/2022       Chemistry        Component Value Date/Time     (L) 03/22/2023 0916    K 4.9 03/22/2023 0916     03/22/2023 0916    CO2 22 (L) 03/22/2023 0916    BUN 11 03/22/2023 0916    CREATININE 1.1 03/22/2023 0916    GLU 95 03/22/2023 0916        Component Value Date/Time    CALCIUM 10.0 03/22/2023 0916    ALKPHOS 44 (L) 03/22/2023 0916    AST 22 03/22/2023 0916    ALT 37 03/22/2023 0916    BILITOT 0.4 03/22/2023 0916    ESTGFRAFRICA >60 06/24/2022 1454    EGFRNONAA >60 06/24/2022 1454           Lab Results   Component Value Date    TSH 3.176 03/08/2022     No results found for: INR, PROTIME  Lab Results   Component Value Date    WBC 5.32 06/24/2022    HGB 13.3 (L) 06/24/2022    HCT 42.0 06/24/2022    MCV 73 (L) 06/24/2022     06/24/2022     BNP  @LABRCNTIP(BNP,BNPTRIAGEBLO)@  CrCl cannot be calculated (Patient's most recent lab result is older than the maximum 7 days allowed.).     Imaging:  ======  No results found for this or any previous visit.    No results found for this or any previous visit.    No results found for this or any previous visit.    Results for orders placed during the hospital encounter of 04/20/22    X-Ray Chest PA And Lateral    Narrative  EXAMINATION:  XR CHEST PA AND LATERAL    CLINICAL HISTORY:  SOB; Mixed simple and mucopurulent chronic bronchitis    TECHNIQUE:  PA and lateral views of the chest were performed.    COMPARISON:  None    FINDINGS:  Single lead left-sided AICD device is present.  No focal pulmonary consolidation.  Hyperdense nodule in the left mediastinum most suggestive of prior granulomatous exposure.  Descending thoracic aorta is tortuous.  Cardiomediastinal silhouette is not enlarged.  Bones appear intact..    Impression  As above      Electronically signed by: Cm Hernandez MD  Date:    04/20/2022  Time:    11:14    No results found for this or any previous visit.    No valid procedures specified.    Diagnostic Results:  ECG: Reviewed    The ASCVD Risk score ([place] DK, et al., 2019) failed to calculate for the following reasons:    The valid total cholesterol range is 130 to 320 mg/dL    Assessment and Plan:   ICD (implantable cardioverter-defibrillator) in place  -     Cardiac device check - In Clinic & Hospital; Future    Aneurysm of left ventricle of heart  -     Ambulatory referral/consult to Cardiology  -     Echo; Future    Chronic systolic heart failure    Atherosclerosis of native coronary artery of native heart without angina  pectoris    Permanent atrial fibrillation    Stage 3 severe COPD by GOLD classification    Cigarette nicotine dependence with other nicotine-induced disorder      Reviewed records from Dr. Parvin hollingsworth.    He is on Eliquis for history of AFib.    Will check an echocardiogram given his aneurysmal of the left ventricle to rule out any LV thrombus.    Reviewed all tests and above medical conditions with patient in detail and formulated treatment plan.  Risk factor modification discussed.   Cardiac low salt diet discussed.  Maintaining healthy weight and weight loss goals were discussed in clinic.  On ACE inhibitors, beta-blockers.    Euvolemic blood pressure controlled.      Follow up in six-month

## 2023-06-14 ENCOUNTER — TELEPHONE (OUTPATIENT)
Dept: CARDIOLOGY | Facility: HOSPITAL | Age: 73
End: 2023-06-14
Payer: MEDICARE

## 2023-06-22 ENCOUNTER — PES CALL (OUTPATIENT)
Dept: ADMINISTRATIVE | Facility: CLINIC | Age: 73
End: 2023-06-22
Payer: MEDICARE

## 2023-07-03 RX ORDER — CARVEDILOL 25 MG/1
TABLET ORAL
Qty: 180 TABLET | Refills: 3 | Status: SHIPPED | OUTPATIENT
Start: 2023-07-03 | End: 2024-03-06

## 2023-07-03 RX ORDER — LISINOPRIL 40 MG/1
TABLET ORAL
Qty: 90 TABLET | Refills: 3 | Status: SHIPPED | OUTPATIENT
Start: 2023-07-03 | End: 2024-02-19

## 2023-07-03 RX ORDER — SPIRONOLACTONE 100 MG/1
TABLET, FILM COATED ORAL
Qty: 90 TABLET | Refills: 3 | Status: SHIPPED | OUTPATIENT
Start: 2023-07-03 | End: 2024-03-06

## 2023-07-13 PROBLEM — J84.10 LUNG GRANULOMA: Status: ACTIVE | Noted: 2023-07-13

## 2023-07-13 PROBLEM — I27.20 PULMONARY HYPERTENSION: Status: ACTIVE | Noted: 2023-07-13

## 2023-07-13 PROBLEM — R73.03 PREDIABETES: Status: ACTIVE | Noted: 2023-07-13

## 2023-07-13 PROBLEM — I77.819 AORTIC ECTASIA: Status: ACTIVE | Noted: 2023-07-13

## 2023-07-13 PROBLEM — E78.1 HYPERTRIGLYCERIDEMIA: Status: ACTIVE | Noted: 2023-07-13

## 2023-07-13 PROBLEM — J43.8 OTHER EMPHYSEMA: Status: ACTIVE | Noted: 2023-07-13

## 2023-07-13 PROBLEM — N18.31 STAGE 3A CHRONIC KIDNEY DISEASE: Status: ACTIVE | Noted: 2023-07-13

## 2023-07-13 PROBLEM — I70.0 AORTIC CALCIFICATION: Status: ACTIVE | Noted: 2023-07-13

## 2023-07-18 DIAGNOSIS — I48.21 PERMANENT ATRIAL FIBRILLATION: ICD-10-CM

## 2023-07-20 ENCOUNTER — OFFICE VISIT (OUTPATIENT)
Dept: INTERNAL MEDICINE | Facility: CLINIC | Age: 73
End: 2023-07-20
Payer: MEDICARE

## 2023-07-20 VITALS
HEIGHT: 75 IN | HEART RATE: 88 BPM | DIASTOLIC BLOOD PRESSURE: 70 MMHG | SYSTOLIC BLOOD PRESSURE: 120 MMHG | BODY MASS INDEX: 27.06 KG/M2 | WEIGHT: 217.63 LBS | RESPIRATION RATE: 20 BRPM

## 2023-07-20 DIAGNOSIS — J84.10 LUNG GRANULOMA: ICD-10-CM

## 2023-07-20 DIAGNOSIS — J44.9 STAGE 3 SEVERE COPD BY GOLD CLASSIFICATION: ICD-10-CM

## 2023-07-20 DIAGNOSIS — I26.94 MULTIPLE SUBSEGMENTAL PULMONARY EMBOLI WITHOUT ACUTE COR PULMONALE: ICD-10-CM

## 2023-07-20 DIAGNOSIS — J43.8 OTHER EMPHYSEMA: ICD-10-CM

## 2023-07-20 DIAGNOSIS — I48.21 PERMANENT ATRIAL FIBRILLATION: ICD-10-CM

## 2023-07-20 DIAGNOSIS — R26.9 ABNORMALITY OF GAIT AND MOBILITY: ICD-10-CM

## 2023-07-20 DIAGNOSIS — Z95.5 HISTORY OF CORONARY ARTERY STENT PLACEMENT: ICD-10-CM

## 2023-07-20 DIAGNOSIS — F17.210 CIGARETTE NICOTINE DEPENDENCE WITHOUT COMPLICATION: ICD-10-CM

## 2023-07-20 DIAGNOSIS — I25.10 ATHEROSCLEROSIS OF NATIVE CORONARY ARTERY OF NATIVE HEART WITHOUT ANGINA PECTORIS: ICD-10-CM

## 2023-07-20 DIAGNOSIS — Z00.00 ENCOUNTER FOR MEDICARE ANNUAL WELLNESS EXAM: ICD-10-CM

## 2023-07-20 DIAGNOSIS — Z74.09 OTHER REDUCED MOBILITY: ICD-10-CM

## 2023-07-20 DIAGNOSIS — Z00.00 ENCOUNTER FOR PREVENTATIVE ADULT HEALTH CARE EXAMINATION: Primary | ICD-10-CM

## 2023-07-20 DIAGNOSIS — Z95.810 ICD (IMPLANTABLE CARDIOVERTER-DEFIBRILLATOR) IN PLACE: ICD-10-CM

## 2023-07-20 DIAGNOSIS — I77.819 AORTIC ECTASIA: ICD-10-CM

## 2023-07-20 DIAGNOSIS — F10.20 UNCOMPLICATED ALCOHOL DEPENDENCE: ICD-10-CM

## 2023-07-20 DIAGNOSIS — I70.0 AORTIC CALCIFICATION: ICD-10-CM

## 2023-07-20 DIAGNOSIS — I50.22 CHRONIC SYSTOLIC HEART FAILURE: ICD-10-CM

## 2023-07-20 DIAGNOSIS — E78.1 HYPERTRIGLYCERIDEMIA: ICD-10-CM

## 2023-07-20 DIAGNOSIS — I27.20 PULMONARY HYPERTENSION: ICD-10-CM

## 2023-07-20 DIAGNOSIS — H40.1133 PRIMARY OPEN ANGLE GLAUCOMA (POAG) OF BOTH EYES, SEVERE STAGE: ICD-10-CM

## 2023-07-20 DIAGNOSIS — I25.5 ISCHEMIC CARDIOMYOPATHY: ICD-10-CM

## 2023-07-20 DIAGNOSIS — E78.2 MIXED HYPERLIPIDEMIA: ICD-10-CM

## 2023-07-20 DIAGNOSIS — R73.03 PREDIABETES: ICD-10-CM

## 2023-07-20 DIAGNOSIS — J41.8 MIXED SIMPLE AND MUCOPURULENT CHRONIC BRONCHITIS: ICD-10-CM

## 2023-07-20 DIAGNOSIS — I10 ESSENTIAL HYPERTENSION: ICD-10-CM

## 2023-07-20 DIAGNOSIS — I25.3 ANEURYSM OF LEFT VENTRICLE OF HEART: ICD-10-CM

## 2023-07-20 PROCEDURE — 99999 PR PBB SHADOW E&M-EST. PATIENT-LVL V: CPT | Mod: PBBFAC,HCNC,, | Performed by: NURSE PRACTITIONER

## 2023-07-20 PROCEDURE — 1160F PR REVIEW ALL MEDS BY PRESCRIBER/CLIN PHARMACIST DOCUMENTED: ICD-10-PCS | Mod: HCNC,CPTII,S$GLB, | Performed by: NURSE PRACTITIONER

## 2023-07-20 PROCEDURE — 3288F PR FALLS RISK ASSESSMENT DOCUMENTED: ICD-10-PCS | Mod: HCNC,CPTII,S$GLB, | Performed by: NURSE PRACTITIONER

## 2023-07-20 PROCEDURE — 1100F PR PT FALLS ASSESS DOC 2+ FALLS/FALL W/INJURY/YR: ICD-10-PCS | Mod: HCNC,CPTII,S$GLB, | Performed by: NURSE PRACTITIONER

## 2023-07-20 PROCEDURE — 3008F PR BODY MASS INDEX (BMI) DOCUMENTED: ICD-10-PCS | Mod: HCNC,CPTII,S$GLB, | Performed by: NURSE PRACTITIONER

## 2023-07-20 PROCEDURE — 3078F DIAST BP <80 MM HG: CPT | Mod: HCNC,CPTII,S$GLB, | Performed by: NURSE PRACTITIONER

## 2023-07-20 PROCEDURE — 1159F PR MEDICATION LIST DOCUMENTED IN MEDICAL RECORD: ICD-10-PCS | Mod: HCNC,CPTII,S$GLB, | Performed by: NURSE PRACTITIONER

## 2023-07-20 PROCEDURE — 1100F PTFALLS ASSESS-DOCD GE2>/YR: CPT | Mod: HCNC,CPTII,S$GLB, | Performed by: NURSE PRACTITIONER

## 2023-07-20 PROCEDURE — G0439 PR MEDICARE ANNUAL WELLNESS SUBSEQUENT VISIT: ICD-10-PCS | Mod: HCNC,S$GLB,, | Performed by: NURSE PRACTITIONER

## 2023-07-20 PROCEDURE — 3074F PR MOST RECENT SYSTOLIC BLOOD PRESSURE < 130 MM HG: ICD-10-PCS | Mod: HCNC,CPTII,S$GLB, | Performed by: NURSE PRACTITIONER

## 2023-07-20 PROCEDURE — 3288F FALL RISK ASSESSMENT DOCD: CPT | Mod: HCNC,CPTII,S$GLB, | Performed by: NURSE PRACTITIONER

## 2023-07-20 PROCEDURE — 1159F MED LIST DOCD IN RCRD: CPT | Mod: HCNC,CPTII,S$GLB, | Performed by: NURSE PRACTITIONER

## 2023-07-20 PROCEDURE — 4010F ACE/ARB THERAPY RXD/TAKEN: CPT | Mod: HCNC,CPTII,S$GLB, | Performed by: NURSE PRACTITIONER

## 2023-07-20 PROCEDURE — 3044F HG A1C LEVEL LT 7.0%: CPT | Mod: HCNC,CPTII,S$GLB, | Performed by: NURSE PRACTITIONER

## 2023-07-20 PROCEDURE — 1170F FXNL STATUS ASSESSED: CPT | Mod: HCNC,CPTII,S$GLB, | Performed by: NURSE PRACTITIONER

## 2023-07-20 PROCEDURE — 1160F RVW MEDS BY RX/DR IN RCRD: CPT | Mod: HCNC,CPTII,S$GLB, | Performed by: NURSE PRACTITIONER

## 2023-07-20 PROCEDURE — 3078F PR MOST RECENT DIASTOLIC BLOOD PRESSURE < 80 MM HG: ICD-10-PCS | Mod: HCNC,CPTII,S$GLB, | Performed by: NURSE PRACTITIONER

## 2023-07-20 PROCEDURE — G0439 PPPS, SUBSEQ VISIT: HCPCS | Mod: HCNC,S$GLB,, | Performed by: NURSE PRACTITIONER

## 2023-07-20 PROCEDURE — 4010F PR ACE/ARB THEARPY RXD/TAKEN: ICD-10-PCS | Mod: HCNC,CPTII,S$GLB, | Performed by: NURSE PRACTITIONER

## 2023-07-20 PROCEDURE — 3074F SYST BP LT 130 MM HG: CPT | Mod: HCNC,CPTII,S$GLB, | Performed by: NURSE PRACTITIONER

## 2023-07-20 PROCEDURE — 99999 PR PBB SHADOW E&M-EST. PATIENT-LVL V: ICD-10-PCS | Mod: PBBFAC,HCNC,, | Performed by: NURSE PRACTITIONER

## 2023-07-20 PROCEDURE — 3044F PR MOST RECENT HEMOGLOBIN A1C LEVEL <7.0%: ICD-10-PCS | Mod: HCNC,CPTII,S$GLB, | Performed by: NURSE PRACTITIONER

## 2023-07-20 PROCEDURE — 1170F PR FUNCTIONAL STATUS ASSESSED: ICD-10-PCS | Mod: HCNC,CPTII,S$GLB, | Performed by: NURSE PRACTITIONER

## 2023-07-20 PROCEDURE — 3008F BODY MASS INDEX DOCD: CPT | Mod: HCNC,CPTII,S$GLB, | Performed by: NURSE PRACTITIONER

## 2023-07-20 NOTE — PROGRESS NOTES
"  Raghavendra Luz presented for a  Medicare AWV and comprehensive Health Risk Assessment today. The following components were reviewed and updated:    Medical history  Family History  Social history  Allergies and Current Medications  Health Risk Assessment  Health Maintenance  Care Team         ** See Completed Assessments for Annual Wellness Visit within the encounter summary.**         The following assessments were completed:  Living Situation  CAGE  Depression Screening  Timed Get Up and Go  Whisper Test  Cognitive Function Screening  Nutrition Screening  ADL Screening  PAQ Screening    Granddaughter, Mayra, present during clinic visit    Vitals:    07/20/23 1521   BP: 120/70   BP Location: Right arm   Patient Position: Sitting   Pulse: 88   Resp: 20   Weight: 98.7 kg (217 lb 9.5 oz)   Height:    Pain scale 6' 3" (1.905 m)    1/5 Leg pain- chronic     Body mass index is 27.2 kg/m².  Physical Exam  Constitutional:       General: He is not in acute distress.     Appearance: He is not ill-appearing or diaphoretic.      Comments: Frail, elderly   HENT:      Head: Atraumatic.      Mouth/Throat:      Mouth: Mucous membranes are moist.   Eyes:      General:         Right eye: No discharge.         Left eye: No discharge.      Conjunctiva/sclera: Conjunctivae normal.   Cardiovascular:      Rate and Rhythm: Normal rate and regular rhythm.      Heart sounds: Normal heart sounds.   Pulmonary:      Effort: Pulmonary effort is normal. No respiratory distress.      Comments: BBS diminished  Skin:     General: Skin is warm and dry.   Neurological:      Mental Status: He is alert and oriented to person, place, and time. Mental status is at baseline.      Gait: Gait abnormal.   Psychiatric:         Mood and Affect: Mood normal.         Behavior: Behavior normal.         Thought Content: Thought content normal.         Judgment: Judgment normal.           Diagnoses and health risks identified today and associated " "recommendations/orders:    1. Encounter for preventative adult health care examination, Encounter for Medicare annual wellness exam  - Ambulatory Referral/Consult to Enhanced Annual Wellness Visit (eAWV)  Review for opioid screening: Patient does not have Rx for Opioids  Review for substance use disorder: Patient does not use substance per chart    Patient states he drinks alcohol- States he drinks 5-6 beers a day    I offered to discuss advanced care planning, including how to pick a person who would make decisions for you if you were unable to make them for yourself, called a health care power of , and what kind of decisions you might make such as use of life sustaining treatments such as ventilators and tube feeding when faced with a life limiting illness recorded on a living will that they will need to know. (How you want to be cared for as you near the end of your natural life)      X Patient is interested in learning more about how to make advanced directives.  I provided them paperwork and offered to discuss this with them.  - Ambulatory referral/consult to Pharmacy Assistance; Future- Eliquis, rocklatan    2. Mixed simple and mucopurulent chronic bronchitis, Stage 3 severe COPD by GOLD classification, Pulmonary hypertension, Multiple subsegmental pulmonary emboli without acute cor pulmonale, Other emphysema, Lung granuloma,  Cigarette nicotine dependence without complication  6/01/2023 Ct scan- Left lung calcified granulomas present.  The lungs show findings consistent with emphysema.- Dx discussed with patient  Monitor  Follow up with Pulmonology as directed  Continue home albuterol, Eliquis  Patient educated on importance of smoking cessation- Patient states he tried the smoking cessation program before and it did not work for him  Patient states he smokes "a little less than 1 PPD cigarettes"    3. Prediabetes   Monitor  Follow up with PCP  Patient encouraged to eat NCS diet   Dx discussed with " patient    4. Chronic systolic heart failure,  Ischemic cardiomyopathy, ICD (implantable cardioverter-defibrillator) in place, Pulmonary hypertension,Aneurysm of left ventricle of heart   Monitor  Follow up with Cardiology  Patient states he is currently no taking lasix or aldactone  Blood pressure control  Pending echo order noted from Dr. Morales- Patient encouraged to get his echo completed    5. Permanent atrial fibrillation  Monitor  Follow up with Cardiology  Continue home Eliquis, coreg    6. Essential hypertension  Monitor  Follow up as directed  Patient states recently his blood pressure has been running on the lower side  Continue home lisinopril, coreg    7. Aortic calcification, Aortic ectasia, Atherosclerosis of native coronary artery of native heart without angina pectoris, History of coronary artery stent placement, Mixed hyperlipidemia, Hypertriglyceridemia  6/01/2023 CT scan- Aorta and vasculature: Atherosclerotic calcification most prevalent at the arch with ectasia of the ascending segment and aortic arch extending to the junction with the descending thoracic aorta. Dx discussed with patient  Monitor  Follow up with Cardiology  Continue asa, lipitor, prn Ntg    8. Uncomplicated alcohol dependence  Monitor  Follow up with PCP  Discussed with patient importance of cutting back on alcohol drinking  Patient states he drinks alcohol- States he drinks 5-6 beers a day    9. Primary open angle glaucoma (POAG) of both eyes, severe stage  Monitor  Follow up with Ophtho  Continue home joan blum rocklatan                         Provided Raghavendra with a 5-10 year written screening schedule and personal prevention plan. Recommendations were developed using the USPSTF age appropriate recommendations. Education, counseling, and referrals were provided as needed. After Visit Summary printed and given to patient which includes a list of additional screenings\tests needed.    Follow up in about 1 year  (around 7/20/2024) for Annual wellness visit.    Yara Delatorre, ARLENE    I offered to discuss advanced care planning, including how to pick a person who would make decisions for you if you were unable to make them for yourself, called a health care power of , and what kind of decisions you might make such as use of life sustaining treatments such as ventilators and tube feeding when faced with a life limiting illness recorded on a living will that they will need to know. (How you want to be cared for as you near the end of your natural life)     X Patient is interested in learning more about how to make advanced directives.  I provided them paperwork and offered to discuss this with them.

## 2023-07-20 NOTE — PATIENT INSTRUCTIONS
Counseling and Referral of Other Preventative  (Italic type indicates deductible and co-insurance are waived)    Patient Name: Raghavendra Luz  Today's Date: 7/20/2023    Health Maintenance       Date Due Completion Date    Shingles Vaccine (1 of 2) Never done ---    Pneumococcal Vaccines (Age 65+) (3 - PPSV23 if available, else PCV20) 09/23/2020 9/20/2017    COVID-19 Vaccine (5 - Mixed Product series) 02/24/2022 12/30/2021    Influenza Vaccine (1) 09/01/2023 9/30/2022    Hemoglobin A1c (Prediabetes) 03/22/2024 3/22/2023    LDCT Lung Screen 06/01/2024 6/1/2023    High Dose Statin 07/20/2024 7/20/2023    Colorectal Cancer Screening 09/07/2025 9/7/2022    TETANUS VACCINE 09/23/2025 9/23/2015    Lipid Panel 03/22/2028 3/22/2023        Orders Placed This Encounter   Procedures    Ambulatory referral/consult to Pharmacy Assistance       The following information is provided to all patients.  This information is to help you find resources for any of the problems found today that may be affecting your health:                Living healthy guide: www.[facility].[place].gov      Understanding Diabetes: www.diabetes.org      Eating healthy: www.cdc.gov/healthyweight      CDC home safety checklist: www.cdc.gov/steadi/patient.html      Agency on Aging: www.goea.[place].[facility]      Alcoholics anonymous (AA): www.aa.org      Physical Activity: www.sweetie.nih.gov/bc3vfmz      Tobacco use: www.quitwithusla.org

## 2023-09-25 ENCOUNTER — TELEPHONE (OUTPATIENT)
Dept: CARDIOLOGY | Facility: HOSPITAL | Age: 73
End: 2023-09-25
Payer: MEDICARE

## 2023-10-15 NOTE — TELEPHONE ENCOUNTER
No care due was identified.  [facility] Embedded Care Due Messages. Reference number: [id number].   10/15/2023 5:31:11 PM CDT

## 2023-10-16 RX ORDER — ATORVASTATIN CALCIUM 80 MG/1
TABLET, FILM COATED ORAL
Qty: 90 TABLET | Refills: 1 | Status: SHIPPED | OUTPATIENT
Start: 2023-10-16 | End: 2024-03-06 | Stop reason: SDUPTHER

## 2023-10-16 NOTE — TELEPHONE ENCOUNTER
Refill Decision Note   Raghavendra Luz  is requesting a refill authorization.  Brief Assessment and Rationale for Refill:  Approve     Medication Therapy Plan:         Comments:     Note composed:3:55 PM 10/16/2023

## 2024-01-16 DIAGNOSIS — F17.210 NICOTINE DEPENDENCE, CIGARETTES, UNCOMPLICATED: Primary | ICD-10-CM

## 2024-01-16 DIAGNOSIS — F17.218 CIGARETTE NICOTINE DEPENDENCE WITH OTHER NICOTINE-INDUCED DISORDER: ICD-10-CM

## 2024-02-19 ENCOUNTER — OFFICE VISIT (OUTPATIENT)
Dept: INTERNAL MEDICINE | Facility: CLINIC | Age: 74
End: 2024-02-19
Payer: MEDICARE

## 2024-02-19 ENCOUNTER — TELEPHONE (OUTPATIENT)
Dept: CARDIOLOGY | Facility: HOSPITAL | Age: 74
End: 2024-02-19
Payer: MEDICARE

## 2024-02-19 VITALS
WEIGHT: 204.56 LBS | TEMPERATURE: 97 F | HEIGHT: 75 IN | HEART RATE: 52 BPM | OXYGEN SATURATION: 100 % | BODY MASS INDEX: 25.44 KG/M2 | DIASTOLIC BLOOD PRESSURE: 50 MMHG | SYSTOLIC BLOOD PRESSURE: 90 MMHG

## 2024-02-19 DIAGNOSIS — R73.03 PREDIABETES: ICD-10-CM

## 2024-02-19 DIAGNOSIS — I95.9 HYPOTENSION, UNSPECIFIED HYPOTENSION TYPE: ICD-10-CM

## 2024-02-19 DIAGNOSIS — R55 SYNCOPE, UNSPECIFIED SYNCOPE TYPE: ICD-10-CM

## 2024-02-19 DIAGNOSIS — N17.9 AKI (ACUTE KIDNEY INJURY): ICD-10-CM

## 2024-02-19 DIAGNOSIS — Z09 FOLLOW-UP EXAM: Primary | ICD-10-CM

## 2024-02-19 DIAGNOSIS — J41.8 MIXED SIMPLE AND MUCOPURULENT CHRONIC BRONCHITIS: ICD-10-CM

## 2024-02-19 DIAGNOSIS — L72.3 SEBACEOUS CYST: ICD-10-CM

## 2024-02-19 DIAGNOSIS — R41.3 MEMORY LOSS: ICD-10-CM

## 2024-02-19 DIAGNOSIS — I10 ESSENTIAL HYPERTENSION: ICD-10-CM

## 2024-02-19 PROCEDURE — 3074F SYST BP LT 130 MM HG: CPT | Mod: HCNC,CPTII,S$GLB, | Performed by: NURSE PRACTITIONER

## 2024-02-19 PROCEDURE — 99214 OFFICE O/P EST MOD 30 MIN: CPT | Mod: HCNC,S$GLB,, | Performed by: NURSE PRACTITIONER

## 2024-02-19 PROCEDURE — 3008F BODY MASS INDEX DOCD: CPT | Mod: HCNC,CPTII,S$GLB, | Performed by: NURSE PRACTITIONER

## 2024-02-19 PROCEDURE — 1101F PT FALLS ASSESS-DOCD LE1/YR: CPT | Mod: HCNC,CPTII,S$GLB, | Performed by: NURSE PRACTITIONER

## 2024-02-19 PROCEDURE — 1126F AMNT PAIN NOTED NONE PRSNT: CPT | Mod: HCNC,CPTII,S$GLB, | Performed by: NURSE PRACTITIONER

## 2024-02-19 PROCEDURE — 4010F ACE/ARB THERAPY RXD/TAKEN: CPT | Mod: HCNC,CPTII,S$GLB, | Performed by: NURSE PRACTITIONER

## 2024-02-19 PROCEDURE — 1159F MED LIST DOCD IN RCRD: CPT | Mod: HCNC,CPTII,S$GLB, | Performed by: NURSE PRACTITIONER

## 2024-02-19 PROCEDURE — 99999 PR PBB SHADOW E&M-EST. PATIENT-LVL V: CPT | Mod: PBBFAC,HCNC,, | Performed by: NURSE PRACTITIONER

## 2024-02-19 PROCEDURE — 3078F DIAST BP <80 MM HG: CPT | Mod: HCNC,CPTII,S$GLB, | Performed by: NURSE PRACTITIONER

## 2024-02-19 PROCEDURE — 3288F FALL RISK ASSESSMENT DOCD: CPT | Mod: HCNC,CPTII,S$GLB, | Performed by: NURSE PRACTITIONER

## 2024-02-19 NOTE — TELEPHONE ENCOUNTER
----- Message from Nandini Pineda MA sent at 2/19/2024  2:02 PM CST -----  Regarding: Reschedule appointment  Good afternoon,    Please contact pt to reschedule Cardiology appointment. Pt will be waiting for call.

## 2024-02-19 NOTE — PROGRESS NOTES
Subjective:       Patient ID: Raghavendra Luz is a 73 y.o. male.    Chief Complaint:   HPI    Recent ER visit for syncope.     * Syncope  a recurrent issue over a year, now increasing in frequency. Pt's last syncopal episode was reportedly a week ago. Was brought in today however out of family's concerns. Pt does not appear concerned however and wants to go home. Given his syncopal episodes are preceded by positional change with lightheadedness, I suspect that he is having orthostatic syncope. This may be predisposed by his chronic ETOH consumption along with poor hydration. On lab review, it appears he is chronically and intermittently hemo concentrated, which supports this differential diagnosis. His ICD has been interrogated with no concerning findings. I have counseled pt on ETOH cessation, and increasing PO hydration. I have advised him f/u with his cardiologist in the outpt.    ROSALVA (acute kidney injury) (HCC)  He presents with Cr of 1.2 with baseline of ~0.9 per chart review. Overall appears volume down. Likely related to his chronic poor hydration and natural diuretic effects of his consumed daily ETOH. Agree with IVF's in ED. Pt is tolerating PO without issues. Counseled pt on ETOH cessation and increase PO hydration with water.    Sebaceous cyst  pt complains of 2 months history R neck bump and 2 week history of bump on R brow. I was able to express some thick white discharge which appears c/w sebaceous cyst. Low suspicion for abscess at this time. I advised pt to f/u with PCP.    Essential hypertension  pressures are WNL in the ED    Ventricular tachycardia (HCC)  s/p ICD. Interrogated in the ED and he has not had any episodes of VF or sustained VT.        No further syncopal episodes  Reports adequate hydration intake now was dehydrated in ER  Cysts to face- wants to have these removed  Hypotension- BP initially 80/60s when arrived to office. Reports feeling weak and has ortostatic s/s when he  stands.  Extensive card hx - missed last card appt.   Here with grand daughter. He lives alone, but they live in apt adjacent to him.     Review of Systems   Constitutional:  Negative for activity change, appetite change, chills, diaphoresis, fatigue, fever and unexpected weight change.   HENT:  Negative for congestion, ear pain, postnasal drip, rhinorrhea, sinus pressure, sinus pain, sneezing, sore throat, tinnitus, trouble swallowing and voice change.    Eyes:  Negative for photophobia, pain and visual disturbance.   Respiratory:  Negative for cough, chest tightness, shortness of breath and wheezing.    Cardiovascular:  Negative for chest pain, palpitations and leg swelling.   Gastrointestinal:  Negative for abdominal distention, abdominal pain, constipation, diarrhea, nausea and vomiting.   Genitourinary:  Negative for decreased urine volume, difficulty urinating, dysuria, flank pain, frequency, hematuria and urgency.   Musculoskeletal:  Negative for arthralgias, back pain, joint swelling, neck pain and neck stiffness.   Allergic/Immunologic: Negative for immunocompromised state.   Neurological:  Positive for weakness. Negative for dizziness, tremors, seizures, syncope, facial asymmetry, speech difficulty, light-headedness, numbness and headaches.   Hematological:  Negative for adenopathy. Does not bruise/bleed easily.   Psychiatric/Behavioral:  Negative for confusion and sleep disturbance.        Objective:      Physical Exam  Vitals reviewed.   Cardiovascular:      Rate and Rhythm: Normal rate and regular rhythm.      Pulses: Normal pulses.      Heart sounds: Normal heart sounds.   Pulmonary:      Effort: Pulmonary effort is normal.      Breath sounds: Normal breath sounds.   Abdominal:      General: Bowel sounds are normal.      Palpations: Abdomen is soft.   Musculoskeletal:         General: Normal range of motion.   Skin:     General: Skin is warm and dry.   Neurological:      Mental Status: He is alert and  oriented to person, place, and time.         Assessment:     Vitals:    02/19/24 1528   BP: (!) 90/50   Pulse:    Temp:          1. Follow-up exam    2. Essential hypertension    3. Prediabetes    4. Mixed simple and mucopurulent chronic bronchitis    5. Sebaceous cyst    6. Syncope, unspecified syncope type    7. ROSALVA (acute kidney injury)    8. Hypotension, unspecified hypotension type    9. Memory loss        Plan:   Follow-up exam    Essential hypertension  -     Lipid Panel; Future; Expected date: 02/19/2024  -     Hemoglobin A1C; Future; Expected date: 02/19/2024  -     Comprehensive Metabolic Panel; Future; Expected date: 08/17/2024  -     Lipid Panel; Future; Expected date: 08/17/2024  -     CBC Auto Differential; Future; Expected date: 08/17/2024  -     TSH; Future; Expected date: 08/17/2024  -     Basic Metabolic Panel; Future; Expected date: 02/19/2024    Prediabetes  -     Lipid Panel; Future; Expected date: 02/19/2024  -     Hemoglobin A1C; Future; Expected date: 02/19/2024  -     Hemoglobin A1C; Future; Expected date: 08/17/2024  -     Basic Metabolic Panel; Future; Expected date: 02/19/2024    Mixed simple and mucopurulent chronic bronchitis    Sebaceous cyst  -     Ambulatory referral/consult to General Surgery; Future; Expected date: 02/26/2024    Syncope, unspecified syncope type    ROSALVA (acute kidney injury)    Hypotension, unspecified hypotension type    Memory loss  -     Cancel: Ambulatory referral/consult to Neurology; Future; Expected date: 02/26/2024  -     Ambulatory referral/consult to Neurology; Future; Expected date: 02/26/2024    Hold lisinopril for now  Send readings in 1 week- BID BP checks  Resched card follow up- Dr. Rico or Dr. Day   Labs today- lipid, a1c  [place] 6/6   Gen surg

## 2024-02-27 DIAGNOSIS — Z00.00 ROUTINE ADULT HEALTH MAINTENANCE: Primary | ICD-10-CM

## 2024-02-27 DIAGNOSIS — Z76.89 ENCOUNTER TO ESTABLISH CARE: ICD-10-CM

## 2024-03-05 NOTE — PROGRESS NOTES
Subjective:   Patient ID:  Raghavendra Luz is a 73 y.o. male who presents for cardiac consult of Medication Refill (Aspirin, Atorvastatin, pantoprazole and Nitroglycerin. ), Chest Pain (Rated at an 4.), and Blood Pressure Check (Pt blood pressure has been running low. Pt was told by PCP to stop taking the lisinopril. )      Referral by: No referring provider defined for this encounter.     Reason for consult:       HPI  The patient came in today for cardiac consult of Medication Refill (Aspirin, Atorvastatin, pantoprazole and Nitroglycerin. ), Chest Pain (Rated at an 4.), and Blood Pressure Check (Pt blood pressure has been running low. Pt was told by PCP to stop taking the lisinopril. )      Raghavendra Luz is a 73 y.o. male ETOH and tobacco abuse, HTN, COPD, HFrEF (EF 30-35% TTE 2/2021), s/p ICD, CAD s/p PCI to RCA, afib here for CV follow up.         HPI 6/5/23  73-year-old male with extensive past medical history   He had an MI 2011 with late presentation of an occluded LAD.    History of RCA stent.    As per outpatient records his angiogram in 2014 showed stable patent RCA stent and occluded LAD.    He had an ICD defibrillator he states that he had a generator change a year ago.    He denies any dyspnea, chest pain, lower extremity swelling.  Syncope or presyncope   No palpitations.    No arrhythmias .  No defibrillator shocks.    Found recently on a CT scan of the lungs to have left ventricular aneurysm.      3/6/24  Pt of Dr. Morales seen last June.     History of Present Illness  74 yo AAM with ETOH and tobacco abuse, HTN, COPD, HFrEF (EF 30-35% TTE 2/2021), s/p ICD, CAD s/p PCI to RCA, afib, who was brought in by his dtrs for evaluation of syncope. This is a recurrent issue for pt apparently for over a year. Previously pt would experience 1-2 syncopal episodes per months, but now having it 1-2 times per week. Pt states typically when he stands up from sitting position he will get lightheaded, and then as he  "begins to walk is when he will experience the syncopal episode. He denies preceding chest pain, palpitations, or SOB. He reports in the past he has been told his syncopal episodes were related to dehydration and was advised to hydrate more. However, dtrs report pt is stubborn and continues to drink only 16-24 oz of water per day. Additionally he admits to drinking a 6 pack of beer daily, and is not willing to stop despite the urging of his family and medical providers. Pt reports his cardiologist was Dr. Corcoran who he has not seen for over a year because "I'm doing fine". Pt denies ICD shocks, diarrhea, n/v. He reports his last syncopal episode was last week, however he dtr's brought him in today at the urging of pt's wife who was "worried about him". Pt additionally complains about a cyst on his R neck which has been present for months and seemingly enlargening, and now with a bump on his R brow that has been present for the last 2 weeks. Pt states bump on his neck has been draining white stuff.     In the ED, pt arrived with normal vitals. His serum work-up significant for Cr1.2 (baseline 0.9). negative pertinent studies include BNP, troponin, and UA. A NCCT head and CXR showed no acute findings. EKG had chronic change of RBBB and diffuse T wave inversions, stable from previous EKGs. His ICD was interrogated in ED and found to have no episodes of sustained VT or vfib. [facility] was consulted for further evaluation.     Discharge Summary   * Syncope  a recurrent issue over a year, now increasing in frequency. Pt's last syncopal episode was reportedly a week ago. Was brought in today however out of family's concerns. Pt does not appear concerned however and wants to go home. Given his syncopal episodes are preceded by positional change with lightheadedness, I suspect that he is having orthostatic syncope. This may be predisposed by his chronic ETOH consumption along with poor hydration. On lab review, it appears he is " chronically and intermittently hemo concentrated, which supports this differential diagnosis. His ICD has been interrogated with no concerning findings. I have counseled pt on ETOH cessation, and increasing PO hydration. I have advised him f/u with his cardiologist in the outpt.     ROSALVA (acute kidney injury) (HCC)  He presents with Cr of 1.2 with baseline of ~0.9 per chart review. Overall appears volume down. Likely related to his chronic poor hydration and natural diuretic effects of his consumed daily ETOH. Agree with IVF's in ED. Pt is tolerating PO without issues. Counseled pt on ETOH cessation and increase PO hydration with water.     3/6/24  Pt here for recent follow up post [facility] ER for syncope, ROSALVA, received IVF. Saw Dr. Morales last year has not followed up.   BNP was neg. No recent ECHO or CV workup done.     BP is low 80/64. HR 59. BMI 25 - 204 lbs  He takes Eliquis, Coreg, asa.     His brother had infusion of DBT  at home.     ECG - sinus vj, RBBB, infarct, poor RWP     No cardiac monitor results found for the past 12 months         Past Medical History:   Diagnosis Date    Asthma     Cataract     CHF (congestive heart failure)     COPD (chronic obstructive pulmonary disease)     GERD (gastroesophageal reflux disease)     Glaucoma     Hypertension        Past Surgical History:   Procedure Laterality Date    ANKLE FRACTURE SURGERY Left     COLONOSCOPY      COLONOSCOPY N/A 2022    Procedure: COLONOSCOPY;  Surgeon: Sandra Blake MD;  Location: [facility] ENDO;  Service: Gastroenterology;  Laterality: N/A;    COLONOSCOPY N/A 2022    Procedure: COLONOSCOPY;  Surgeon: Sandra Blake MD;  Location: [facility] ENDO;  Service: Gastroenterology;  Laterality: N/A;    CORONARY ANGIOPLASTY WITH STENT PLACEMENT         Social History     Tobacco Use    Smoking status: Every Day     Current packs/day: 1.00     Average packs/day: 1 pack/day for 54.2 years (54.2 ttl pk-yrs)     Types: Cigarettes     Start  date: 1/1/1970    Smokeless tobacco: Never   Substance Use Topics    Alcohol use: Yes     Alcohol/week: 6.0 standard drinks of alcohol     Types: 6 Cans of beer per week     Comment: six pack daily    Drug use: Never       Family History   Problem Relation Age of Onset    Heart disease Mother     Ovarian cancer Mother     Diabetes Father     Heart disease Father     Brain Hemorrhage Sister     Heart disease Brother        Patient's Medications   New Prescriptions    CARVEDILOL (COREG) 3.125 MG TABLET    Take 1 tablet (3.125 mg total) by mouth 2 (two) times daily with meals.    FUROSEMIDE (LASIX) 20 MG TABLET    Take 1 tablet (20 mg total) by mouth daily as needed (edema, swelling, fluid build up).   Previous Medications    ALBUTEROL (PROVENTIL/VENTOLIN HFA) 90 MCG/ACTUATION INHALER    Inhale 2 puffs into the lungs every 4 (four) hours as needed for Wheezing or Shortness of Breath.    ASPIRIN (ECOTRIN) 81 MG EC TABLET    Take 81 mg by mouth.    BIMATOPROST (LUMIGAN) 0.01 % DROP    Place 1 drop into both eyes every evening.    BRIMONIDINE 0.2% (ALPHAGAN) 0.2 % DROP    Place 1 drop into both eyes every 12 (twelve) hours.    NETARSUDIL-LATANOPROST (ROCKLATAN) 0.02-0.005 % DROP    Place 1 drop into both eyes every evening.    NITROGLYCERIN (NITROSTAT) 0.4 MG SL TABLET    PLACE 1 TABLET UNDER THE TONGUE EVERY 5 (FIVE) MINUTES AS NEEDED FOR CHEST PAIN.MAX 3 DOSES IN15 MIN    PANTOPRAZOLE (PROTONIX) 40 MG TABLET        TIOTROPIUM-OLODATEROL (STIOLTO RESPIMAT) 2.5-2.5 MCG/ACTUATION MIST    Inhale 1 puff into the lungs once daily. Controller   Modified Medications    Modified Medication Previous Medication    ATORVASTATIN (LIPITOR) 80 MG TABLET atorvastatin (LIPITOR) 80 MG tablet       Take 1 tablet (80 mg total) by mouth once daily.    Take 1 tablet by mouth once daily    ELIQUIS 5 MG TAB ELIQUIS 5 mg Tab       Take 1 tablet (5 mg total) by mouth 2 (two) times daily.    Take 1 tablet (5 mg total) by mouth 2 (two) times  "daily.   Discontinued Medications    CARVEDILOL (COREG) 25 MG TABLET    TAKE 1 TABLET BY MOUTH TWICE DAILY WITH MEALS    FUROSEMIDE (LASIX) 40 MG TABLET    TAKE 1 TABLET BY MOUTH ONCE DAILY AS NEEDED FOR SWELLING    SPIRONOLACTONE (ALDACTONE) 100 MG TABLET    Take 1 tablet by mouth once daily       Review of Systems   Constitutional:  Positive for malaise/fatigue.   HENT: Negative.     Eyes: Negative.    Respiratory: Negative.     Cardiovascular: Negative.    Gastrointestinal: Negative.    Genitourinary: Negative.    Musculoskeletal: Negative.    Skin: Negative.    Neurological:  Positive for dizziness and loss of consciousness.   Endo/Heme/Allergies: Negative.    Psychiatric/Behavioral: Negative.     All 12 systems otherwise negative.      Wt Readings from Last 3 Encounters:   03/06/24 92.9 kg (204 lb 12.9 oz)   03/06/24 92.9 kg (204 lb 12.9 oz)   02/19/24 92.8 kg (204 lb 9.4 oz)     Temp Readings from Last 3 Encounters:   02/19/24 96.9 °F (36.1 °C) (Tympanic)   03/31/23 98 °F (36.7 °C) (Tympanic)   09/30/22 97.2 °F (36.2 °C)     BP Readings from Last 3 Encounters:   03/06/24 (!) 80/64   02/19/24 (!) 90/50   07/20/23 120/70     Pulse Readings from Last 3 Encounters:   03/06/24 (!) 59   02/19/24 (!) 52   07/20/23 88       BP (!) 80/64 (BP Location: Right arm, Patient Position: Sitting, BP Method: Medium (Manual))   Pulse (!) 59   Ht 6' 3" (1.905 m)   Wt 92.9 kg (204 lb 12.9 oz)   SpO2 100%   BMI 25.60 kg/m²     Objective:   Physical Exam  Vitals and nursing note reviewed.   Constitutional:       General: He is not in acute distress.     Appearance: He is well-developed. He is not diaphoretic.   HENT:      Head: Normocephalic and atraumatic.      Nose: Nose normal.   Eyes:      General: No scleral icterus.     Conjunctiva/sclera: Conjunctivae normal.   Neck:      Thyroid: No thyromegaly.      Vascular: No JVD.   Cardiovascular:      Rate and Rhythm: Normal rate and regular rhythm.      Heart sounds: S1 normal " and S2 normal. Murmur heard.      No friction rub. No gallop. No S3 or S4 sounds.   Pulmonary:      Effort: Pulmonary effort is normal. No respiratory distress.      Breath sounds: Normal breath sounds. No stridor. No wheezing or rales.   Chest:      Chest wall: No tenderness.   Abdominal:      General: Bowel sounds are normal. There is no distension.      Palpations: Abdomen is soft. There is no mass.      Tenderness: There is no abdominal tenderness. There is no rebound.   Genitourinary:     Comments: Deferred  Musculoskeletal:         General: No tenderness or deformity. Normal range of motion.      Cervical back: Normal range of motion and neck supple.   Lymphadenopathy:      Cervical: No cervical adenopathy.   Skin:     General: Skin is warm and dry.      Coloration: Skin is not pale.      Findings: No erythema or rash.   Neurological:      Mental Status: He is alert and oriented to person, place, and time.      Motor: No abnormal muscle tone.      Coordination: Coordination normal.   Psychiatric:         Behavior: Behavior normal.         Thought Content: Thought content normal.         Judgment: Judgment normal.         Lab Results   Component Value Date     (L) 03/22/2023    K 4.9 03/22/2023     03/22/2023    CO2 22 (L) 03/22/2023    BUN 11 03/22/2023    CREATININE 1.1 03/22/2023    GLU 95 03/22/2023    HGBA1C 5.9 (H) 03/22/2023    AST 22 03/22/2023    ALT 37 03/22/2023    ALBUMIN 4.4 03/22/2023    PROT 7.5 03/22/2023    BILITOT 0.4 03/22/2023    WBC 5.32 06/24/2022    HGB 13.3 (L) 06/24/2022    HCT 42.0 06/24/2022    MCV 73 (L) 06/24/2022     06/24/2022    INR 1.2 02/08/2021    TSH 3.176 03/08/2022    CHOL 122 03/22/2023    HDL 38 (L) 03/22/2023    LDLCALC 53.2 (L) 03/22/2023    TRIG 154 (H) 03/22/2023    BNP 54 02/04/2024    BNP 45 06/24/2022         BNP   Date Value   02/04/2024 54 PG/ML   02/19/2023 155 PG/ML (H)   06/24/2022 45 pg/mL   02/08/2021 31 PG/ML   02/25/2020 186 PG/ML (H)      INR (no units)   Date Value   02/08/2021 1.2          Assessment:      1. Chronic systolic heart failure    2. Multiple subsegmental pulmonary emboli without acute cor pulmonale    3. ICD (implantable cardioverter-defibrillator) in place    4. Stage 3 severe COPD by GOLD classification    5. Ischemic cardiomyopathy    6. Mixed hyperlipidemia    7. Essential hypertension    8. PAF (paroxysmal atrial fibrillation)    9. Coronary artery disease of native artery of native heart with stable angina pectoris        Plan:        1. CAD s/p PCI, ICM EF 30% s/p ICD with PAF  - titrate meds, asa, statin   - cont Eliquis, BB  - f/u EP/device clinic  - dec Coreg, lasix PRN 20mg     2. HTN, recent syncope --> hypotensive   - cont diuretics PRN - DC Lasix/Aldactone  - needs to monitor I/O and avoid alc  - dec Coreg dose    3. HLD  - cont statin    4. GERD  - cont PPI    5. COPD  - cont tx per pulm    6. Alc abuse and tobacco abuse  - needs cessation     Visit today included increased complexity associated with the care of the episodic problem CHF addressed and managing the longitudinal care of the patient due to the serious and/or complex managed problem(s) .      Thank you for allowing me to participate in this patient's care. Please do not hesitate to contact me with any questions or concerns. Consult note has been forwarded to the referral physician.

## 2024-03-06 ENCOUNTER — OFFICE VISIT (OUTPATIENT)
Dept: CARDIOLOGY | Facility: CLINIC | Age: 74
End: 2024-03-06
Payer: MEDICARE

## 2024-03-06 ENCOUNTER — HOSPITAL ENCOUNTER (OUTPATIENT)
Dept: CARDIOLOGY | Facility: HOSPITAL | Age: 74
Discharge: HOME | End: 2024-03-06
Attending: INTERNAL MEDICINE
Payer: MEDICARE

## 2024-03-06 VITALS
DIASTOLIC BLOOD PRESSURE: 64 MMHG | SYSTOLIC BLOOD PRESSURE: 80 MMHG | WEIGHT: 204.81 LBS | HEART RATE: 59 BPM | OXYGEN SATURATION: 100 % | HEIGHT: 75 IN | BODY MASS INDEX: 25.47 KG/M2

## 2024-03-06 VITALS — BODY MASS INDEX: 25.6 KG/M2 | WEIGHT: 204.81 LBS

## 2024-03-06 DIAGNOSIS — I50.22 CHRONIC SYSTOLIC HEART FAILURE: Primary | ICD-10-CM

## 2024-03-06 DIAGNOSIS — J44.9 STAGE 3 SEVERE COPD BY GOLD CLASSIFICATION: ICD-10-CM

## 2024-03-06 DIAGNOSIS — I25.118 CORONARY ARTERY DISEASE OF NATIVE ARTERY OF NATIVE HEART WITH STABLE ANGINA PECTORIS: ICD-10-CM

## 2024-03-06 DIAGNOSIS — I48.0 PAF (PAROXYSMAL ATRIAL FIBRILLATION): ICD-10-CM

## 2024-03-06 DIAGNOSIS — E78.2 MIXED HYPERLIPIDEMIA: ICD-10-CM

## 2024-03-06 DIAGNOSIS — Z76.89 ENCOUNTER TO ESTABLISH CARE: ICD-10-CM

## 2024-03-06 DIAGNOSIS — I10 ESSENTIAL HYPERTENSION: ICD-10-CM

## 2024-03-06 DIAGNOSIS — Z00.00 ROUTINE ADULT HEALTH MAINTENANCE: ICD-10-CM

## 2024-03-06 DIAGNOSIS — Z95.810 ICD (IMPLANTABLE CARDIOVERTER-DEFIBRILLATOR) IN PLACE: ICD-10-CM

## 2024-03-06 DIAGNOSIS — I26.94 MULTIPLE SUBSEGMENTAL PULMONARY EMBOLI WITHOUT ACUTE COR PULMONALE: ICD-10-CM

## 2024-03-06 DIAGNOSIS — I25.5 ISCHEMIC CARDIOMYOPATHY: ICD-10-CM

## 2024-03-06 LAB
OHS QRS DURATION: 132 MS
OHS QTC CALCULATION: 403 MS

## 2024-03-06 PROCEDURE — G2211 COMPLEX E/M VISIT ADD ON: HCPCS | Mod: HCNC,S$GLB,, | Performed by: INTERNAL MEDICINE

## 2024-03-06 PROCEDURE — 1125F AMNT PAIN NOTED PAIN PRSNT: CPT | Mod: HCNC,CPTII,S$GLB, | Performed by: INTERNAL MEDICINE

## 2024-03-06 PROCEDURE — 1101F PT FALLS ASSESS-DOCD LE1/YR: CPT | Mod: HCNC,CPTII,S$GLB, | Performed by: INTERNAL MEDICINE

## 2024-03-06 PROCEDURE — 3078F DIAST BP <80 MM HG: CPT | Mod: HCNC,CPTII,S$GLB, | Performed by: INTERNAL MEDICINE

## 2024-03-06 PROCEDURE — 3008F BODY MASS INDEX DOCD: CPT | Mod: HCNC,CPTII,S$GLB, | Performed by: INTERNAL MEDICINE

## 2024-03-06 PROCEDURE — 99215 OFFICE O/P EST HI 40 MIN: CPT | Mod: HCNC,S$GLB,, | Performed by: INTERNAL MEDICINE

## 2024-03-06 PROCEDURE — 4010F ACE/ARB THERAPY RXD/TAKEN: CPT | Mod: HCNC,CPTII,S$GLB, | Performed by: INTERNAL MEDICINE

## 2024-03-06 PROCEDURE — 93005 ELECTROCARDIOGRAM TRACING: CPT | Mod: HCNC

## 2024-03-06 PROCEDURE — 99999 PR PBB SHADOW E&M-EST. PATIENT-LVL III: CPT | Mod: PBBFAC,HCNC,, | Performed by: INTERNAL MEDICINE

## 2024-03-06 PROCEDURE — 3074F SYST BP LT 130 MM HG: CPT | Mod: HCNC,CPTII,S$GLB, | Performed by: INTERNAL MEDICINE

## 2024-03-06 PROCEDURE — 1160F RVW MEDS BY RX/DR IN RCRD: CPT | Mod: HCNC,CPTII,S$GLB, | Performed by: INTERNAL MEDICINE

## 2024-03-06 PROCEDURE — 93010 ELECTROCARDIOGRAM REPORT: CPT | Mod: HCNC,,, | Performed by: INTERNAL MEDICINE

## 2024-03-06 PROCEDURE — 1159F MED LIST DOCD IN RCRD: CPT | Mod: HCNC,CPTII,S$GLB, | Performed by: INTERNAL MEDICINE

## 2024-03-06 PROCEDURE — 3288F FALL RISK ASSESSMENT DOCD: CPT | Mod: HCNC,CPTII,S$GLB, | Performed by: INTERNAL MEDICINE

## 2024-03-06 RX ORDER — APIXABAN 5 MG/1
5 TABLET, FILM COATED ORAL 2 TIMES DAILY
Qty: 180 TABLET | Refills: 3 | Status: SHIPPED | OUTPATIENT
Start: 2024-03-06

## 2024-03-06 RX ORDER — CARVEDILOL 3.12 MG/1
3.12 TABLET ORAL 2 TIMES DAILY WITH MEALS
Qty: 60 TABLET | Refills: 1 | Status: SHIPPED | OUTPATIENT
Start: 2024-03-06 | End: 2025-03-06

## 2024-03-06 RX ORDER — ATORVASTATIN CALCIUM 80 MG/1
80 TABLET, FILM COATED ORAL DAILY
Qty: 90 TABLET | Refills: 1 | Status: SHIPPED | OUTPATIENT
Start: 2024-03-06

## 2024-03-06 RX ORDER — FUROSEMIDE 20 MG/1
20 TABLET ORAL DAILY PRN
Qty: 30 TABLET | Refills: 3 | Status: SHIPPED | OUTPATIENT
Start: 2024-03-06 | End: 2025-03-06

## 2024-03-11 ENCOUNTER — PATIENT MESSAGE (OUTPATIENT)
Dept: CARDIOLOGY | Facility: HOSPITAL | Age: 74
End: 2024-03-11
Payer: MEDICARE

## 2024-03-20 ENCOUNTER — PATIENT MESSAGE (OUTPATIENT)
Dept: CARDIOLOGY | Facility: HOSPITAL | Age: 74
End: 2024-03-20
Payer: MEDICARE

## 2024-03-27 ENCOUNTER — PATIENT MESSAGE (OUTPATIENT)
Dept: CARDIOLOGY | Facility: HOSPITAL | Age: 74
End: 2024-03-27
Payer: MEDICARE

## 2024-05-02 ENCOUNTER — HOSPITAL ENCOUNTER (OUTPATIENT)
Dept: CARDIOLOGY | Facility: HOSPITAL | Age: 74
Discharge: HOME | End: 2024-05-02
Attending: INTERNAL MEDICINE
Payer: MEDICARE

## 2024-05-02 VITALS — HEIGHT: 75 IN | BODY MASS INDEX: 25.36 KG/M2 | WEIGHT: 204 LBS

## 2024-05-02 DIAGNOSIS — I25.5 ISCHEMIC CARDIOMYOPATHY: ICD-10-CM

## 2024-05-02 DIAGNOSIS — I25.118 CORONARY ARTERY DISEASE OF NATIVE ARTERY OF NATIVE HEART WITH STABLE ANGINA PECTORIS: ICD-10-CM

## 2024-05-02 DIAGNOSIS — I48.0 PAF (PAROXYSMAL ATRIAL FIBRILLATION): ICD-10-CM

## 2024-05-02 DIAGNOSIS — I26.94 MULTIPLE SUBSEGMENTAL PULMONARY EMBOLI WITHOUT ACUTE COR PULMONALE: ICD-10-CM

## 2024-05-02 DIAGNOSIS — I10 ESSENTIAL HYPERTENSION: ICD-10-CM

## 2024-05-02 DIAGNOSIS — E78.2 MIXED HYPERLIPIDEMIA: ICD-10-CM

## 2024-05-02 DIAGNOSIS — Z95.810 ICD (IMPLANTABLE CARDIOVERTER-DEFIBRILLATOR) IN PLACE: ICD-10-CM

## 2024-05-02 DIAGNOSIS — I50.22 CHRONIC SYSTOLIC HEART FAILURE: ICD-10-CM

## 2024-05-02 LAB
AV INDEX (PROSTH): 0.82
AV MEAN GRADIENT: 2 MMHG
AV PEAK GRADIENT: 4 MMHG
AV VALVE AREA BY VELOCITY RATIO: 2.54 CM²
AV VALVE AREA: 2.55 CM²
AV VELOCITY RATIO: 0.82
BSA FOR ECHO PROCEDURE: 2.21 M2
CV ECHO LV RWT: 0.52 CM
DOP CALC AO PEAK VEL: 0.98 M/S
DOP CALC AO VTI: 18.8 CM
DOP CALC LVOT AREA: 3.1 CM2
DOP CALC LVOT DIAMETER: 1.99 CM
DOP CALC LVOT PEAK VEL: 0.8 M/S
DOP CALC LVOT STROKE VOLUME: 47.87 CM3
DOP CALCLVOT PEAK VEL VTI: 15.4 CM
E WAVE DECELERATION TIME: 243.96 MSEC
E/A RATIO: 0.65
E/E' RATIO: 12.75 M/S
ECHO LV POSTERIOR WALL: 1.07 CM (ref 0.6–1.1)
EJECTION FRACTION: 35 %
FRACTIONAL SHORTENING: 29 % (ref 28–44)
INTERVENTRICULAR SEPTUM: 1.09 CM (ref 0.6–1.1)
LA MAJOR: 3.98 CM
LA MINOR: 4.09 CM
LA WIDTH: 2.9 CM
LEFT ATRIUM SIZE: 2.08 CM
LEFT ATRIUM VOLUME INDEX MOD: 19.7 ML/M2
LEFT ATRIUM VOLUME INDEX: 9.4 ML/M2
LEFT ATRIUM VOLUME MOD: 43.61 CM3
LEFT ATRIUM VOLUME: 20.68 CM3
LEFT INTERNAL DIMENSION IN SYSTOLE: 2.92 CM (ref 2.1–4)
LEFT VENTRICLE DIASTOLIC VOLUME INDEX: 33.36 ML/M2
LEFT VENTRICLE DIASTOLIC VOLUME: 73.73 ML
LEFT VENTRICLE MASS INDEX: 66 G/M2
LEFT VENTRICLE SYSTOLIC VOLUME INDEX: 14.8 ML/M2
LEFT VENTRICLE SYSTOLIC VOLUME: 32.74 ML
LEFT VENTRICULAR INTERNAL DIMENSION IN DIASTOLE: 4.09 CM (ref 3.5–6)
LEFT VENTRICULAR MASS: 146.8 G
LV LATERAL E/E' RATIO: 12.75 M/S
LV SEPTAL E/E' RATIO: 12.75 M/S
LVOT MG: 1.26 MMHG
LVOT MV: 0.52 CM/S
MV PEAK A VEL: 0.79 M/S
MV PEAK E VEL: 0.51 M/S
OHS CV RV/LV RATIO: 0.52 CM
RA MAJOR: 3.6 CM
RA PRESSURE ESTIMATED: 3 MMHG
RA WIDTH: 2.64 CM
RIGHT VENTRICULAR END-DIASTOLIC DIMENSION: 2.11 CM
SINUS: 2.98 CM
STJ: 2.88 CM
TDI LATERAL: 0.04 M/S
TDI SEPTAL: 0.04 M/S
TDI: 0.04 M/S
Z-SCORE OF LEFT VENTRICULAR DIMENSION IN END DIASTOLE: -6.24
Z-SCORE OF LEFT VENTRICULAR DIMENSION IN END SYSTOLE: -3.65

## 2024-05-02 PROCEDURE — 93306 TTE W/DOPPLER COMPLETE: CPT

## 2024-05-02 PROCEDURE — 93306 TTE W/DOPPLER COMPLETE: CPT | Mod: 26,,, | Performed by: STUDENT IN AN ORGANIZED HEALTH CARE EDUCATION/TRAINING PROGRAM

## 2024-06-18 ENCOUNTER — HOSPITAL ENCOUNTER (EMERGENCY)
Facility: HOSPITAL | Age: 74
Discharge: HOME | End: 2024-06-18
Attending: EMERGENCY MEDICINE
Payer: MEDICARE

## 2024-06-18 VITALS
OXYGEN SATURATION: 98 % | BODY MASS INDEX: 25.52 KG/M2 | HEART RATE: 70 BPM | WEIGHT: 204.13 LBS | RESPIRATION RATE: 18 BRPM | SYSTOLIC BLOOD PRESSURE: 116 MMHG | TEMPERATURE: 98 F | DIASTOLIC BLOOD PRESSURE: 65 MMHG

## 2024-06-18 DIAGNOSIS — L72.3 SEBACEOUS CYST: Primary | ICD-10-CM

## 2024-06-18 PROBLEM — Z79.01 CHRONIC ANTICOAGULATION: Status: ACTIVE | Noted: 2024-06-18

## 2024-06-18 PROCEDURE — 99284 EMERGENCY DEPT VISIT MOD MDM: CPT

## 2024-06-18 RX ORDER — SULFAMETHOXAZOLE AND TRIMETHOPRIM 800; 160 MG/1; MG/1
1 TABLET ORAL 2 TIMES DAILY
Qty: 20 TABLET | Refills: 0 | Status: SHIPPED | OUTPATIENT
Start: 2024-06-18 | End: 2024-06-28

## 2024-06-18 RX ORDER — TRAMADOL HYDROCHLORIDE 50 MG/1
50 TABLET ORAL EVERY 6 HOURS PRN
Qty: 12 TABLET | Refills: 0 | Status: SHIPPED | OUTPATIENT
Start: 2024-06-18

## 2024-06-18 NOTE — ED PROVIDER NOTES
Encounter Date: 6/18/2024       History     Chief Complaint   Patient presents with    Cyst     Cyst on right side of neck pt unknown how long its been there. Per pt it was the size of a golf ball that popped x3days. +serosanguinous drainage from side +painful +swelling      Patient is a 74-year-old male who presents with an open sebaceous cyst to the right side of the neck.  States he has had this cyst for multiple years but it has recently started growing and causing him discomfort.  States it opened while he was sleeping last night and had drainage on his clothes.  Reports ongoing swelling though.      Review of patient's allergies indicates:   Allergen Reactions    Iodine Anaphylaxis     Shellfish allergy - pt confirms can take Iodine products.    Shellfish containing products Anaphylaxis    Olmesartan Hives     Past Medical History:   Diagnosis Date    Asthma     Cataract     CHF (congestive heart failure)     COPD (chronic obstructive pulmonary disease)     GERD (gastroesophageal reflux disease)     Glaucoma     Hypertension      Past Surgical History:   Procedure Laterality Date    ANKLE FRACTURE SURGERY Left     COLONOSCOPY      COLONOSCOPY N/A 5/18/2022    Procedure: COLONOSCOPY;  Surgeon: Sandra Blake MD;  Location: [facility] ENDO;  Service: Gastroenterology;  Laterality: N/A;    COLONOSCOPY N/A 9/7/2022    Procedure: COLONOSCOPY;  Surgeon: Sandra Blake MD;  Location: [facility] ENDO;  Service: Gastroenterology;  Laterality: N/A;    CORONARY ANGIOPLASTY WITH STENT PLACEMENT       Family History   Problem Relation Name Age of Onset    Heart disease Mother      Ovarian cancer Mother      Diabetes Father      Heart disease Father      Brain Hemorrhage Sister      Heart disease Brother       Social History     Tobacco Use    Smoking status: Every Day     Current packs/day: 1.00     Average packs/day: 1 pack/day for 54.5 years (54.5 ttl pk-yrs)     Types: Cigarettes     Start date: 1/1/1970    Smokeless  tobacco: Never   Substance Use Topics    Alcohol use: Yes     Alcohol/week: 6.0 standard drinks of alcohol     Types: 6 Cans of beer per week     Comment: six pack daily    Drug use: Never     Review of Systems   Constitutional:  Negative for fever.   HENT:  Negative for sore throat.    Respiratory:  Negative for shortness of breath.    Cardiovascular:  Negative for chest pain.   Gastrointestinal:  Negative for nausea.   Genitourinary:  Negative for dysuria.   Musculoskeletal:  Negative for back pain.   Skin:  Positive for wound (open cyst to the right side of the neck). Negative for rash.   Neurological:  Negative for weakness.   Hematological:  Does not bruise/bleed easily.       Physical Exam     Initial Vitals [06/18/24 1751]   BP Pulse Resp Temp SpO2   116/65 70 18 97.8 °F (36.6 °C) 98 %      MAP       --         Physical Exam    Nursing note and vitals reviewed.  Constitutional: He appears well-developed and well-nourished.   HENT:   Head: Normocephalic and atraumatic.   Eyes: Conjunctivae and EOM are normal. Pupils are equal, round, and reactive to light.   Neck: Neck supple.   Normal range of motion.  Cardiovascular:  Normal rate, regular rhythm, normal heart sounds and intact distal pulses.           Pulmonary/Chest: Breath sounds normal.   Abdominal: Abdomen is soft. Bowel sounds are normal.   Musculoskeletal:         General: Normal range of motion.      Cervical back: Normal range of motion and neck supple.     Neurological: He is alert and oriented to person, place, and time. He has normal strength and normal reflexes.   Skin: Skin is warm and dry. Capillary refill takes less than 2 seconds. Abscess (open sebaceous cyst to the right side of the neck with moderate amount of fluctuance and drainage.) noted.   Psychiatric: He has a normal mood and affect. His behavior is normal. Judgment and thought content normal.         ED Course   Procedures  Labs Reviewed - No data to display       Imaging Results     None          Medications - No data to display  Medical Decision Making  Cyst was drained through opening that happened spontaneous at home.  No anesthesia used.  Wound dressed with sterile dressing.  Patient placed on antibiotics and told to take all antibiotics as prescribed and follow-up with dermatologist for further evaluation.  Patient verbalized understanding agrees to plan.    Risk  Prescription drug management.                                      Clinical Impression:  Final diagnoses:  [L72.3] Sebaceous cyst (Primary)          ED Disposition Condition    Discharge Stable          ED Prescriptions       Medication Sig Dispense Start Date End Date Auth. Provider    sulfamethoxazole-trimethoprim 800-160mg (BACTRIM DS) 800-160 mg Tab Take 1 tablet by mouth 2 (two) times daily. for 10 days 20 tablet 6/18/2024 6/28/2024 Jaime Gipson NP    traMADoL (ULTRAM) 50 mg tablet Take 1 tablet (50 mg total) by mouth every 6 (six) hours as needed. 12 tablet 6/18/2024 -- Jaime Gipson NP          Follow-up Information       Follow up With Specialties Details Why Contact Info    Jermain Dobson MD Internal Medicine, Pediatrics  As needed [id number]  [place] [address]  [phone]               Jiame Gipson NP  06/18/24 [id number]

## 2024-07-17 ENCOUNTER — PATIENT MESSAGE (OUTPATIENT)
Dept: ADMINISTRATIVE | Facility: CLINIC | Age: 74
End: 2024-07-17
Payer: MEDICARE

## 2024-08-14 RX ORDER — LISINOPRIL 40 MG/1
TABLET ORAL
Qty: 90 TABLET | Refills: 3 | Status: SHIPPED | OUTPATIENT
Start: 2024-08-14

## 2024-08-14 RX ORDER — CARVEDILOL 25 MG/1
TABLET ORAL
Qty: 180 TABLET | Refills: 3 | Status: SHIPPED | OUTPATIENT
Start: 2024-08-14

## 2024-08-14 RX ORDER — SPIRONOLACTONE 100 MG/1
TABLET, FILM COATED ORAL
Qty: 90 TABLET | Refills: 3 | Status: SHIPPED | OUTPATIENT
Start: 2024-08-14

## 2024-10-15 ENCOUNTER — DOCUMENTATION ONLY (OUTPATIENT)
Dept: INTERNAL MEDICINE | Facility: CLINIC | Age: 74
End: 2024-10-15
Payer: MEDICARE

## 2025-01-14 DIAGNOSIS — Z00.00 ENCOUNTER FOR MEDICARE ANNUAL WELLNESS EXAM: ICD-10-CM

## 2025-03-18 NOTE — ASSESSMENT & PLAN NOTE
Continue ELIQUIS   32-year-old female past medical history of PCOS and obesity presenting for evaluation of diarrhea and vomiting that began when she woke up this morning.  Patient states that she ate chicken yesterday evening but nobody else who ate the chicken had similar symptoms.  No sick contacts.  Patient states she has generalized abdominal discomfort.  She denies fevers, chills, chest pain, shortness of breath, dysuria, hematuria.

## 2025-03-29 DIAGNOSIS — I26.94 MULTIPLE SUBSEGMENTAL PULMONARY EMBOLI WITHOUT ACUTE COR PULMONALE: ICD-10-CM

## 2025-03-31 RX ORDER — APIXABAN 5 MG/1
5 TABLET, FILM COATED ORAL 2 TIMES DAILY
Qty: 180 TABLET | Refills: 0 | Status: SHIPPED | OUTPATIENT
Start: 2025-03-31

## 2025-04-01 ENCOUNTER — PATIENT MESSAGE (OUTPATIENT)
Dept: CARDIOLOGY | Facility: CLINIC | Age: 75
End: 2025-04-01
Payer: MEDICARE

## 2025-04-01 ENCOUNTER — TELEPHONE (OUTPATIENT)
Dept: PULMONOLOGY | Facility: CLINIC | Age: 75
End: 2025-04-01
Payer: MEDICARE

## 2025-04-01 ENCOUNTER — OFFICE VISIT (OUTPATIENT)
Dept: INTERNAL MEDICINE | Facility: CLINIC | Age: 75
End: 2025-04-01
Payer: MEDICARE

## 2025-04-01 ENCOUNTER — LAB VISIT (OUTPATIENT)
Dept: LAB | Facility: HOSPITAL | Age: 75
End: 2025-04-01
Attending: NURSE PRACTITIONER
Payer: MEDICARE

## 2025-04-01 VITALS
WEIGHT: 206.13 LBS | OXYGEN SATURATION: 100 % | SYSTOLIC BLOOD PRESSURE: 120 MMHG | TEMPERATURE: 98 F | DIASTOLIC BLOOD PRESSURE: 76 MMHG | HEIGHT: 75 IN | HEART RATE: 91 BPM | BODY MASS INDEX: 25.63 KG/M2

## 2025-04-01 DIAGNOSIS — J44.9 STAGE 3 SEVERE COPD BY GOLD CLASSIFICATION: ICD-10-CM

## 2025-04-01 DIAGNOSIS — I50.22 CHRONIC SYSTOLIC HEART FAILURE: ICD-10-CM

## 2025-04-01 DIAGNOSIS — R73.03 PREDIABETES: Primary | ICD-10-CM

## 2025-04-01 DIAGNOSIS — I25.5 ISCHEMIC CARDIOMYOPATHY: ICD-10-CM

## 2025-04-01 DIAGNOSIS — I10 ESSENTIAL HYPERTENSION: ICD-10-CM

## 2025-04-01 DIAGNOSIS — Z95.810 ICD (IMPLANTABLE CARDIOVERTER-DEFIBRILLATOR) IN PLACE: ICD-10-CM

## 2025-04-01 DIAGNOSIS — E78.1 HYPERTRIGLYCERIDEMIA: ICD-10-CM

## 2025-04-01 DIAGNOSIS — Z12.5 SCREENING FOR MALIGNANT NEOPLASM OF PROSTATE: ICD-10-CM

## 2025-04-01 DIAGNOSIS — J41.8 MIXED SIMPLE AND MUCOPURULENT CHRONIC BRONCHITIS: ICD-10-CM

## 2025-04-01 DIAGNOSIS — F10.20 UNCOMPLICATED ALCOHOL DEPENDENCE: ICD-10-CM

## 2025-04-01 DIAGNOSIS — R73.03 PREDIABETES: ICD-10-CM

## 2025-04-01 DIAGNOSIS — Z79.01 CHRONIC ANTICOAGULATION: ICD-10-CM

## 2025-04-01 DIAGNOSIS — I48.21 PERMANENT ATRIAL FIBRILLATION: ICD-10-CM

## 2025-04-01 LAB
ALBUMIN SERPL BCP-MCNC: 4.3 G/DL (ref 3.5–5.2)
ALP SERPL-CCNC: 45 UNIT/L (ref 40–150)
ALT SERPL W/O P-5'-P-CCNC: 41 UNIT/L (ref 10–44)
ANION GAP (OHS): 11 MMOL/L (ref 8–16)
AST SERPL-CCNC: 26 UNIT/L (ref 11–45)
BILIRUB SERPL-MCNC: 0.4 MG/DL (ref 0.1–1)
BUN SERPL-MCNC: 7 MG/DL (ref 8–23)
CALCIUM SERPL-MCNC: 9.7 MG/DL (ref 8.7–10.5)
CHLORIDE SERPL-SCNC: 96 MMOL/L (ref 95–110)
CHOLEST SERPL-MCNC: 146 MG/DL (ref 120–199)
CHOLEST/HDLC SERPL: 3.2 {RATIO} (ref 2–5)
CO2 SERPL-SCNC: 24 MMOL/L (ref 23–29)
CREAT SERPL-MCNC: 1.1 MG/DL (ref 0.5–1.4)
GFR SERPLBLD CREATININE-BSD FMLA CKD-EPI: >60 ML/MIN/1.73/M2
GLUCOSE SERPL-MCNC: 92 MG/DL (ref 70–110)
HDLC SERPL-MCNC: 46 MG/DL (ref 40–75)
HDLC SERPL: 31.5 % (ref 20–50)
LDLC SERPL CALC-MCNC: 85.2 MG/DL (ref 63–159)
NONHDLC SERPL-MCNC: 100 MG/DL
POTASSIUM SERPL-SCNC: 5.2 MMOL/L (ref 3.5–5.1)
PROT SERPL-MCNC: 7.8 GM/DL (ref 6–8.4)
PSA SERPL-MCNC: 2.36 NG/ML
SODIUM SERPL-SCNC: 131 MMOL/L (ref 136–145)
T4 FREE SERPL-MCNC: NORMAL NG/DL
TRIGL SERPL-MCNC: 74 MG/DL (ref 30–150)
TSH SERPL-ACNC: 2.15 UIU/ML (ref 0.4–4)

## 2025-04-01 PROCEDURE — 84153 ASSAY OF PSA TOTAL: CPT

## 2025-04-01 PROCEDURE — 99999 PR PBB SHADOW E&M-EST. PATIENT-LVL IV: CPT | Mod: PBBFAC,,, | Performed by: NURSE PRACTITIONER

## 2025-04-01 PROCEDURE — 85025 COMPLETE CBC W/AUTO DIFF WBC: CPT

## 2025-04-01 PROCEDURE — 83036 HEMOGLOBIN GLYCOSYLATED A1C: CPT

## 2025-04-01 PROCEDURE — 80061 LIPID PANEL: CPT

## 2025-04-01 PROCEDURE — 36415 COLL VENOUS BLD VENIPUNCTURE: CPT

## 2025-04-01 PROCEDURE — 80053 COMPREHEN METABOLIC PANEL: CPT

## 2025-04-01 PROCEDURE — 84443 ASSAY THYROID STIM HORMONE: CPT

## 2025-04-01 RX ORDER — CARVEDILOL 25 MG/1
25 TABLET ORAL 2 TIMES DAILY WITH MEALS
Qty: 180 TABLET | Refills: 3 | Status: SHIPPED | OUTPATIENT
Start: 2025-04-01

## 2025-04-01 RX ORDER — BUPROPION HYDROCHLORIDE 150 MG/1
150 TABLET ORAL DAILY
Qty: 30 TABLET | Refills: 11 | Status: SHIPPED | OUTPATIENT
Start: 2025-04-01 | End: 2026-04-01

## 2025-04-01 RX ORDER — ATORVASTATIN CALCIUM 80 MG/1
80 TABLET, FILM COATED ORAL DAILY
Qty: 90 TABLET | Refills: 1 | Status: SHIPPED | OUTPATIENT
Start: 2025-04-01

## 2025-04-01 RX ORDER — TIOTROPIUM BROMIDE AND OLODATEROL 3.124; 2.736 UG/1; UG/1
1 SPRAY, METERED RESPIRATORY (INHALATION) DAILY
Qty: 1 G | Refills: 5 | Status: SHIPPED | OUTPATIENT
Start: 2025-04-01

## 2025-04-01 RX ORDER — LISINOPRIL 40 MG/1
40 TABLET ORAL DAILY
Qty: 90 TABLET | Refills: 3 | Status: SHIPPED | OUTPATIENT
Start: 2025-04-01

## 2025-04-01 RX ORDER — ALBUTEROL SULFATE 90 UG/1
2 INHALANT RESPIRATORY (INHALATION) EVERY 4 HOURS PRN
Qty: 18 G | Refills: 11 | Status: SHIPPED | OUTPATIENT
Start: 2025-04-01

## 2025-04-01 RX ORDER — SPIRONOLACTONE 100 MG/1
100 TABLET, FILM COATED ORAL DAILY
Qty: 90 TABLET | Refills: 3 | Status: SHIPPED | OUTPATIENT
Start: 2025-04-01 | End: 2025-04-02

## 2025-04-01 NOTE — TELEPHONE ENCOUNTER
----- Message from Nurse Trevizo sent at 4/1/2025 11:22 AM CDT -----  Regarding: needs f/u  Good Morning. Could you please reach out to staff to schedule a f/u appt? Thank You./CS

## 2025-04-01 NOTE — PROGRESS NOTES
"  Subjective:       Patient ID: Raghavendra Luz is a 74 y.o. male.    Chief Complaint: Annual Exam, Medication Refill, and Dizziness    HPI    Pt here for check up  He reports that he has been having a lot of life changes hence why he has missed so many appts  Depression - pt states that he has lost 2 of his children with the last 6 months. He reports been very "down". No HI/SI. Requesting antidepressant. Declines therapist for now. Drinking 6 pack of beer daily  1)HTN-compliant with meds. Asymptomatic  2) COPD:  Intermittent sob. Overdue for pulm; out of refills for inhalers  3) Hx PE/AF/CAD: was on eliquis/coreg  4) Severe glaucoma: overdue for follow up. Has not been on drops.  5) LIPIDS:following D&E, tolerating and compliant with med(s).           Review of Systems   Constitutional:  Negative for activity change, appetite change, chills, diaphoresis, fatigue, fever and unexpected weight change.   HENT:  Negative for congestion, ear pain, postnasal drip, rhinorrhea, sinus pressure, sinus pain, sneezing, sore throat, tinnitus, trouble swallowing and voice change.    Eyes:  Negative for photophobia, pain and visual disturbance.   Respiratory:  Positive for shortness of breath (chronic). Negative for cough, chest tightness and wheezing.    Cardiovascular:  Negative for chest pain, palpitations and leg swelling.   Gastrointestinal:  Negative for abdominal distention, abdominal pain, constipation, diarrhea, nausea and vomiting.   Genitourinary:  Negative for decreased urine volume, difficulty urinating, dysuria, flank pain, frequency, hematuria and urgency.   Musculoskeletal:  Negative for arthralgias, back pain, joint swelling, neck pain and neck stiffness.   Allergic/Immunologic: Negative for immunocompromised state.   Neurological:  Negative for dizziness, tremors, seizures, syncope, facial asymmetry, speech difficulty, weakness, light-headedness, numbness and headaches.   Hematological:  Negative for adenopathy. " Does not bruise/bleed easily.   Psychiatric/Behavioral:  Positive for dysphoric mood. Negative for confusion and sleep disturbance.        Objective:      Physical Exam  Vitals reviewed.   Cardiovascular:      Rate and Rhythm: Normal rate.      Heart sounds: Normal heart sounds.   Pulmonary:      Effort: Pulmonary effort is normal.      Breath sounds: Normal breath sounds.   Abdominal:      General: Bowel sounds are normal.      Palpations: Abdomen is soft.   Skin:     General: Skin is warm and dry.   Neurological:      Mental Status: He is alert and oriented to person, place, and time.         Assessment:     Vitals:    04/01/25 1040   BP: 120/76   Pulse: 91   Temp: 97.7 °F (36.5 °C)         1. Prediabetes    2. Hypertriglyceridemia    3. Essential hypertension    4. Chronic systolic heart failure    5. Permanent atrial fibrillation    6. ICD (implantable cardioverter-defibrillator) in place    7. Ischemic cardiomyopathy    8. Stage 3 severe COPD by GOLD classification    9. Screening for malignant neoplasm of prostate        Plan:   Prediabetes  -     Hemoglobin A1C; Future; Expected date: 04/01/2025  -     Comprehensive Metabolic Panel; Future; Expected date: 09/28/2025  -     Lipid Panel; Future; Expected date: 10/01/2025  -     Hemoglobin A1C; Future; Expected date: 09/28/2025    Hypertriglyceridemia  -     Lipid Panel; Future; Expected date: 04/01/2025  -     Comprehensive Metabolic Panel; Future; Expected date: 04/01/2025  -     Comprehensive Metabolic Panel; Future; Expected date: 09/28/2025  -     Lipid Panel; Future; Expected date: 10/01/2025  -     Hemoglobin A1C; Future; Expected date: 09/28/2025    Essential hypertension  -     TSH; Future; Expected date: 04/01/2025  -     CBC Auto Differential; Future; Expected date: 04/01/2025    Chronic systolic heart failure    Permanent atrial fibrillation    ICD (implantable cardioverter-defibrillator) in place    Ischemic cardiomyopathy    Stage 3 severe COPD by  GOLD classification    Screening for malignant neoplasm of prostate  -     PSA, Screening; Future; Expected date: 04/01/2025    Other orders  -     atorvastatin (LIPITOR) 80 MG tablet; Take 1 tablet (80 mg total) by mouth once daily.  Dispense: 90 tablet; Refill: 1  -     spironolactone (ALDACTONE) 100 MG tablet; Take 1 tablet (100 mg total) by mouth once daily.  Dispense: 90 tablet; Refill: 3  -     carvediloL (COREG) 25 MG tablet; Take 1 tablet (25 mg total) by mouth 2 (two) times daily with meals.  Dispense: 180 tablet; Refill: 3      Labs now  Declines therapist for now, but will reconsider. Start wellbutrin, 1 mo follow up  Refill all meds  Message all specialists to arrange appt follow ups -cards,pulm ,opth  6 mo for routine mgt  Chronic complex care completed today

## 2025-04-02 ENCOUNTER — RESULTS FOLLOW-UP (OUTPATIENT)
Dept: INTERNAL MEDICINE | Facility: CLINIC | Age: 75
End: 2025-04-02

## 2025-04-02 ENCOUNTER — TELEPHONE (OUTPATIENT)
Dept: CARDIOLOGY | Facility: CLINIC | Age: 75
End: 2025-04-02
Payer: MEDICARE

## 2025-04-02 LAB
ABSOLUTE EOSINOPHIL (OHS): 0.2 K/UL
ABSOLUTE MONOCYTE (OHS): 0.61 K/UL (ref 0.3–1)
ABSOLUTE NEUTROPHIL COUNT (OHS): 2.66 K/UL (ref 1.8–7.7)
BASOPHILS # BLD AUTO: 0.04 K/UL
BASOPHILS NFR BLD AUTO: 0.8 %
EAG (OHS): 120 MG/DL (ref 68–131)
ERYTHROCYTE [DISTWIDTH] IN BLOOD BY AUTOMATED COUNT: 18.2 % (ref 11.5–14.5)
HBA1C MFR BLD: 5.8 % (ref 4–5.6)
HCT VFR BLD AUTO: 44.3 % (ref 40–54)
HGB BLD-MCNC: 14.2 GM/DL (ref 14–18)
IMM GRANULOCYTES # BLD AUTO: 0.01 K/UL (ref 0–0.04)
IMM GRANULOCYTES NFR BLD AUTO: 0.2 % (ref 0–0.5)
LYMPHOCYTES # BLD AUTO: 1.56 K/UL (ref 1–4.8)
MCH RBC QN AUTO: 24 PG (ref 27–31)
MCHC RBC AUTO-ENTMCNC: 32.1 G/DL (ref 32–36)
MCV RBC AUTO: 75 FL (ref 82–98)
NUCLEATED RBC (/100WBC) (OHS): 0 /100 WBC
PLATELET # BLD AUTO: 249 K/UL (ref 150–450)
PMV BLD AUTO: 11.3 FL (ref 9.2–12.9)
RBC # BLD AUTO: 5.92 M/UL (ref 4.6–6.2)
RELATIVE EOSINOPHIL (OHS): 3.9 %
RELATIVE LYMPHOCYTE (OHS): 30.7 % (ref 18–48)
RELATIVE MONOCYTE (OHS): 12 % (ref 4–15)
RELATIVE NEUTROPHIL (OHS): 52.4 % (ref 38–73)
WBC # BLD AUTO: 5.08 K/UL (ref 3.9–12.7)

## 2025-04-02 RX ORDER — SPIRONOLACTONE 50 MG/1
50 TABLET, FILM COATED ORAL DAILY
Qty: 90 TABLET | Refills: 3 | Status: SHIPPED | OUTPATIENT
Start: 2025-04-02 | End: 2026-04-02

## 2025-04-02 RX ORDER — FUROSEMIDE 20 MG/1
20 TABLET ORAL DAILY PRN
Qty: 30 TABLET | Refills: 3 | Status: SHIPPED | OUTPATIENT
Start: 2025-04-02 | End: 2026-04-02

## 2025-04-02 NOTE — TELEPHONE ENCOUNTER
Lvm for PT to call clinic, scheduled apt with vascular for a follow up and left PT a SeoP[employer] message as well with time date location and provider      ----- Message from Nurse Trevizo sent at 4/1/2025 11:20 AM CDT -----  Regarding: pt needs f/u appt  Good Morning. Could you please reach out to staff to schedule a f/u appt? Thank You./CS

## 2025-04-03 ENCOUNTER — TELEPHONE (OUTPATIENT)
Dept: CARDIOLOGY | Facility: CLINIC | Age: 75
End: 2025-04-03
Payer: MEDICARE

## 2025-04-03 ENCOUNTER — PATIENT MESSAGE (OUTPATIENT)
Dept: CARDIOLOGY | Facility: CLINIC | Age: 75
End: 2025-04-03
Payer: MEDICARE

## 2025-04-03 NOTE — TELEPHONE ENCOUNTER
Appt made when clarified message that needs vascular cardiology per santhosh fisher. Appt made. Pbr    ----- Message -----   From: Santhosh Fisher LPN   Sent: 4/2/2025  11:50 AM CDT   To: Susan Mcknight LPN   Subject: RE: pt needs f/u appt                            cardiology   ----- Message -----   From: Susan Mcknight LPN   Sent: 4/2/2025   9:39 AM CDT   To: Santhosh Fisher LPN   Subject: FW: pt needs f/u appt                            Is this a referral for vascular surgery or is this an appt for vascular cardiology?   ----- Message -----   From: Pia Chapman RN   Sent: 4/1/2025   7:02 PM CDT   To: Susan Mcknight LPN   Subject: RE: pt needs f/u appt                            Hi   This is not a patient of Dr. Han.  Is he a new referral?  I don't see an order or anything stating he needs structural cardiology?   Pia   ----- Message -----   From: Susan Mcknight LPN   Sent: 4/1/2025  11:44 AM CDT   To: Pia Chapman RN   Subject: FW: pt needs f/u appt                              ----- Message -----   From: Santhosh Fisher LPN   Sent: 4/1/2025  11:21 AM CDT   To: [facility] Vascular Cardiology Clinical Support; H*   Subject: pt needs f/u appt                                Good Morning. Could you please reach out to staff to schedule a f/u appt? Thank You./CS

## 2025-04-08 ENCOUNTER — OFFICE VISIT (OUTPATIENT)
Dept: CARDIOLOGY | Facility: CLINIC | Age: 75
End: 2025-04-08
Payer: MEDICARE

## 2025-04-08 VITALS
DIASTOLIC BLOOD PRESSURE: 64 MMHG | BODY MASS INDEX: 26.31 KG/M2 | SYSTOLIC BLOOD PRESSURE: 101 MMHG | HEIGHT: 75 IN | HEART RATE: 83 BPM | WEIGHT: 211.63 LBS | OXYGEN SATURATION: 95 %

## 2025-04-08 DIAGNOSIS — R07.9 CHEST PAIN, UNSPECIFIED TYPE: Primary | ICD-10-CM

## 2025-04-08 DIAGNOSIS — F17.218 CIGARETTE NICOTINE DEPENDENCE WITH OTHER NICOTINE-INDUCED DISORDER: ICD-10-CM

## 2025-04-08 DIAGNOSIS — E78.2 MIXED HYPERLIPIDEMIA: ICD-10-CM

## 2025-04-08 DIAGNOSIS — Z95.810 ICD (IMPLANTABLE CARDIOVERTER-DEFIBRILLATOR) IN PLACE: ICD-10-CM

## 2025-04-08 DIAGNOSIS — F10.20 UNCOMPLICATED ALCOHOL DEPENDENCE: ICD-10-CM

## 2025-04-08 DIAGNOSIS — I48.0 PAF (PAROXYSMAL ATRIAL FIBRILLATION): ICD-10-CM

## 2025-04-08 DIAGNOSIS — I10 PRIMARY HYPERTENSION: ICD-10-CM

## 2025-04-08 DIAGNOSIS — I73.9 PAD (PERIPHERAL ARTERY DISEASE): ICD-10-CM

## 2025-04-08 DIAGNOSIS — R06.09 OTHER FORM OF DYSPNEA: ICD-10-CM

## 2025-04-08 DIAGNOSIS — I10 ESSENTIAL HYPERTENSION: ICD-10-CM

## 2025-04-08 DIAGNOSIS — I25.118 CORONARY ARTERY DISEASE OF NATIVE ARTERY OF NATIVE HEART WITH STABLE ANGINA PECTORIS: ICD-10-CM

## 2025-04-08 DIAGNOSIS — I25.5 ISCHEMIC CARDIOMYOPATHY: ICD-10-CM

## 2025-04-08 DIAGNOSIS — I26.94 MULTIPLE SUBSEGMENTAL PULMONARY EMBOLI WITHOUT ACUTE COR PULMONALE: ICD-10-CM

## 2025-04-08 DIAGNOSIS — I50.22 CHRONIC SYSTOLIC HEART FAILURE: ICD-10-CM

## 2025-04-08 DIAGNOSIS — J44.9 STAGE 3 SEVERE COPD BY GOLD CLASSIFICATION: ICD-10-CM

## 2025-04-08 PROCEDURE — 99215 OFFICE O/P EST HI 40 MIN: CPT | Mod: S$GLB,,, | Performed by: INTERNAL MEDICINE

## 2025-04-08 PROCEDURE — 3044F HG A1C LEVEL LT 7.0%: CPT | Mod: CPTII,S$GLB,, | Performed by: INTERNAL MEDICINE

## 2025-04-08 PROCEDURE — 4010F ACE/ARB THERAPY RXD/TAKEN: CPT | Mod: CPTII,S$GLB,, | Performed by: INTERNAL MEDICINE

## 2025-04-08 PROCEDURE — 3288F FALL RISK ASSESSMENT DOCD: CPT | Mod: CPTII,S$GLB,, | Performed by: INTERNAL MEDICINE

## 2025-04-08 PROCEDURE — 3078F DIAST BP <80 MM HG: CPT | Mod: CPTII,S$GLB,, | Performed by: INTERNAL MEDICINE

## 2025-04-08 PROCEDURE — 1101F PT FALLS ASSESS-DOCD LE1/YR: CPT | Mod: CPTII,S$GLB,, | Performed by: INTERNAL MEDICINE

## 2025-04-08 PROCEDURE — 1159F MED LIST DOCD IN RCRD: CPT | Mod: CPTII,S$GLB,, | Performed by: INTERNAL MEDICINE

## 2025-04-08 PROCEDURE — 3074F SYST BP LT 130 MM HG: CPT | Mod: CPTII,S$GLB,, | Performed by: INTERNAL MEDICINE

## 2025-04-08 PROCEDURE — 1160F RVW MEDS BY RX/DR IN RCRD: CPT | Mod: CPTII,S$GLB,, | Performed by: INTERNAL MEDICINE

## 2025-04-08 PROCEDURE — 99999 PR PBB SHADOW E&M-EST. PATIENT-LVL III: CPT | Mod: PBBFAC,,, | Performed by: INTERNAL MEDICINE

## 2025-04-08 PROCEDURE — G2211 COMPLEX E/M VISIT ADD ON: HCPCS | Mod: S$GLB,,, | Performed by: INTERNAL MEDICINE

## 2025-04-08 PROCEDURE — 1126F AMNT PAIN NOTED NONE PRSNT: CPT | Mod: CPTII,S$GLB,, | Performed by: INTERNAL MEDICINE

## 2025-04-08 PROCEDURE — 3008F BODY MASS INDEX DOCD: CPT | Mod: CPTII,S$GLB,, | Performed by: INTERNAL MEDICINE

## 2025-04-08 RX ORDER — PANTOPRAZOLE SODIUM 40 MG/1
40 TABLET, DELAYED RELEASE ORAL DAILY
Qty: 90 TABLET | Refills: 1 | Status: SHIPPED | OUTPATIENT
Start: 2025-04-08

## 2025-04-08 RX ORDER — NITROGLYCERIN 0.4 MG/1
TABLET SUBLINGUAL
Qty: 20 TABLET | Refills: 0 | Status: SHIPPED | OUTPATIENT
Start: 2025-04-08

## 2025-04-08 RX ORDER — ASPIRIN 81 MG/1
81 TABLET ORAL DAILY
Qty: 90 TABLET | Refills: 3 | Status: SHIPPED | OUTPATIENT
Start: 2025-04-08

## 2025-04-08 RX ORDER — APIXABAN 5 MG/1
5 TABLET, FILM COATED ORAL 2 TIMES DAILY
Qty: 180 TABLET | Refills: 0 | Status: SHIPPED | OUTPATIENT
Start: 2025-04-08

## 2025-04-08 NOTE — PROGRESS NOTES
Subjective:   Patient ID:  Raghavendra Luz is a 74 y.o. male who presents for cardiac consult of No chief complaint on file.      Referral by: No referring provider defined for this encounter.     Reason for consult:       HPI  The patient came in today for cardiac consult of No chief complaint on file.      Raghavendra Luz is a 74 y.o. male h/o ETOH and tobacco abuse, HTN, COPD, HFrEF (EF 30-35%, s/p ICD, CAD s/p PCI to RCA, afib here for CV follow up.       3/6/24  Pt here for recent follow up post [facility] ER for syncope, ROSALVA, received IVF. Saw Dr. Morales last year has not followed up.   BNP was neg. No recent ECHO or CV workup done.     BP is low 80/64. HR 59. BMI 25 - 204 lbs  He takes Eliquis, Coreg, asa.   His brother had infusion of DBT  at home.   ECG - sinus vj, RBBB, infarct, poor RWP       25  Follow up with me since 3/2024.   Last ECHO 2024 with lVEF 35%, RV function reduced, grade 1 DD.     Per notes  Hello this is his son in law He said he dont have a open wound on his feet and his leg hurt when he stand up and sit down   BP low normal. HR 80s. BMI 26 - 211 lbs   He still drinks a 6 pack of beer daily and smokes daily - does not want to quit.   He has lost both kids last year.   He gets chest pain at rest, usually at rest.     No cardiac monitor results found for the past 12 months     Results for orders placed during the hospital encounter of 24    Echo    Interpretation Summary    Left Ventricle: The left ventricle is normal in size. Normal wall thickness. There is concentric remodeling. Regional wall motion abnormalities present. There is reduced systolic function. Ejection fraction by visual approximation is 35%. Grade I diastolic dysfunction.    Right Ventricle: Right ventricle was not well visualized due to poor acoustic window. Systolic function is reduced.    Right Atrium: Lead present in the right atrium.      Past Medical History:   Diagnosis Date    Asthma     Cataract      CHF (congestive heart failure)     COPD (chronic obstructive pulmonary disease)     GERD (gastroesophageal reflux disease)     Glaucoma     Hypertension        Past Surgical History:   Procedure Laterality Date    ANKLE FRACTURE SURGERY Left     COLONOSCOPY      COLONOSCOPY N/A 5/18/2022    Procedure: COLONOSCOPY;  Surgeon: Sandra Blake MD;  Location: [facility] ENDO;  Service: Gastroenterology;  Laterality: N/A;    COLONOSCOPY N/A 9/7/2022    Procedure: COLONOSCOPY;  Surgeon: Sandra Blake MD;  Location: [facility] ENDO;  Service: Gastroenterology;  Laterality: N/A;    CORONARY ANGIOPLASTY WITH STENT PLACEMENT         Social History     Tobacco Use    Smoking status: Every Day     Current packs/day: 1.00     Average packs/day: 1 pack/day for 55.3 years (55.3 ttl pk-yrs)     Types: Cigarettes     Start date: 1/1/1970    Smokeless tobacco: Never   Substance Use Topics    Alcohol use: Yes     Alcohol/week: 6.0 standard drinks of alcohol     Types: 6 Cans of beer per week     Comment: six pack daily    Drug use: Never       Family History   Problem Relation Name Age of Onset    Heart disease Mother      Ovarian cancer Mother      Diabetes Father      Heart disease Father      Brain Hemorrhage Sister      Heart disease Brother         Patient's Medications   New Prescriptions    No medications on file   Previous Medications    ALBUTEROL (PROVENTIL/VENTOLIN HFA) 90 MCG/ACTUATION INHALER    Inhale 2 puffs into the lungs every 4 (four) hours as needed for Wheezing or Shortness of Breath.    ATORVASTATIN (LIPITOR) 80 MG TABLET    Take 1 tablet (80 mg total) by mouth once daily.    BIMATOPROST (LUMIGAN) 0.01 % DROP    Place 1 drop into both eyes every evening.    BRIMONIDINE 0.2% (ALPHAGAN) 0.2 % DROP    Place 1 drop into both eyes every 12 (twelve) hours.    BUPROPION (WELLBUTRIN XL) 150 MG TB24 TABLET    Take 1 tablet (150 mg total) by mouth once daily.    CARVEDILOL (COREG) 25 MG TABLET    Take 1 tablet (25 mg total) by  mouth 2 (two) times daily with meals.    FUROSEMIDE (LASIX) 20 MG TABLET    Take 1 tablet (20 mg total) by mouth daily as needed (edema, swelling, fluid build up).    LISINOPRIL (PRINIVIL,ZESTRIL) 40 MG TABLET    Take 1 tablet (40 mg total) by mouth once daily.    NETARSUDIL-LATANOPROST (ROCKLATAN) 0.02-0.005 % DROP    Place 1 drop into both eyes every evening.    PANTOPRAZOLE (PROTONIX) 40 MG TABLET        SPIRONOLACTONE (ALDACTONE) 50 MG TABLET    Take 1 tablet (50 mg total) by mouth once daily.    TIOTROPIUM-OLODATEROL (STIOLTO RESPIMAT) 2.5-2.5 MCG/ACTUATION MIST    Inhale 1 puff into the lungs once daily. Controller    TRAMADOL (ULTRAM) 50 MG TABLET    Take 1 tablet (50 mg total) by mouth every 6 (six) hours as needed.   Modified Medications    Modified Medication Previous Medication    ASPIRIN (ECOTRIN) 81 MG EC TABLET aspirin (ECOTRIN) 81 MG EC tablet       Take 1 tablet (81 mg total) by mouth once daily.    Take 81 mg by mouth.    ELIQUIS 5 MG TAB ELIQUIS 5 mg Tab       Take 1 tablet (5 mg total) by mouth 2 (two) times daily.    Take 1 tablet (5 mg total) by mouth 2 (two) times daily.    NITROGLYCERIN (NITROSTAT) 0.4 MG SL TABLET nitroGLYCERIN (NITROSTAT) 0.4 MG SL tablet       PLACE 1 TABLET UNDER THE TONGUE EVERY 5 (FIVE) MINUTES AS NEEDED FOR CHEST PAIN.MAX 3 DOSES IN15 MIN    PLACE 1 TABLET UNDER THE TONGUE EVERY 5 (FIVE) MINUTES AS NEEDED FOR CHEST PAIN.MAX 3 DOSES IN15 MIN   Discontinued Medications    No medications on file       Review of Systems   Constitutional:  Positive for malaise/fatigue.   HENT: Negative.     Eyes: Negative.    Respiratory:  Positive for shortness of breath.    Cardiovascular:  Positive for chest pain, palpitations and claudication.   Gastrointestinal: Negative.    Genitourinary: Negative.    Musculoskeletal:  Positive for back pain and joint pain.   Skin: Negative.    Neurological:  Positive for dizziness and loss of consciousness.   Endo/Heme/Allergies: Negative.   "  Psychiatric/Behavioral: Negative.     All 12 systems otherwise negative.      Wt Readings from Last 3 Encounters:   04/08/25 96 kg (211 lb 10.3 oz)   04/01/25 93.5 kg (206 lb 2.1 oz)   06/18/24 92.6 kg (204 lb 2.3 oz)     Temp Readings from Last 3 Encounters:   04/01/25 97.7 °F (36.5 °C) (Tympanic)   06/18/24 97.8 °F (36.6 °C) (Oral)   02/19/24 96.9 °F (36.1 °C) (Tympanic)     BP Readings from Last 3 Encounters:   04/08/25 101/64   04/01/25 120/76   06/18/24 116/65     Pulse Readings from Last 3 Encounters:   04/08/25 83   04/01/25 91   06/18/24 70       /64 (BP Location: Right arm, Patient Position: Sitting)   Pulse 83   Ht 6' 3" (1.905 m)   Wt 96 kg (211 lb 10.3 oz)   SpO2 95%   BMI 26.45 kg/m²     Objective:   Physical Exam  Vitals and nursing note reviewed.   Constitutional:       General: He is not in acute distress.     Appearance: He is well-developed. He is not diaphoretic.   HENT:      Head: Normocephalic and atraumatic.      Nose: Nose normal.   Eyes:      General: No scleral icterus.     Conjunctiva/sclera: Conjunctivae normal.   Neck:      Thyroid: No thyromegaly.      Vascular: No JVD.   Cardiovascular:      Rate and Rhythm: Normal rate and regular rhythm.      Heart sounds: S1 normal and S2 normal. Murmur heard.      No friction rub. No gallop. No S3 or S4 sounds.   Pulmonary:      Effort: Pulmonary effort is normal. No respiratory distress.      Breath sounds: Normal breath sounds. No stridor. No wheezing or rales.   Chest:      Chest wall: No tenderness.   Abdominal:      General: Bowel sounds are normal. There is no distension.      Palpations: Abdomen is soft. There is no mass.      Tenderness: There is no abdominal tenderness. There is no rebound.   Genitourinary:     Comments: Deferred  Musculoskeletal:         General: No tenderness or deformity. Normal range of motion.      Cervical back: Normal range of motion and neck supple.   Lymphadenopathy:      Cervical: No cervical " adenopathy.   Skin:     General: Skin is warm and dry.      Coloration: Skin is not pale.      Findings: No erythema or rash.   Neurological:      Mental Status: He is alert and oriented to person, place, and time.      Motor: No abnormal muscle tone.      Coordination: Coordination normal.   Psychiatric:         Behavior: Behavior normal.         Thought Content: Thought content normal.         Judgment: Judgment normal.         Lab Results   Component Value Date     (L) 04/01/2025     (L) 03/22/2023    K 5.2 (H) 04/01/2025    K 4.9 03/22/2023    CL 96 04/01/2025     03/22/2023    CO2 24 04/01/2025    CO2 22 (L) 03/22/2023    BUN 7 (L) 04/01/2025    CREATININE 1.1 04/01/2025    GLU 95 03/22/2023    HGBA1C 5.8 (H) 04/01/2025    HGBA1C 5.9 (H) 03/22/2023    AST 26 04/01/2025    AST 22 03/22/2023    ALT 41 04/01/2025    ALT 37 03/22/2023    ALBUMIN 4.3 04/01/2025    ALBUMIN 4.4 03/22/2023    PROT 7.5 03/22/2023    BILITOT 0.4 04/01/2025    BILITOT 0.4 03/22/2023    WBC 5.08 04/01/2025    HGB 14.2 04/01/2025    HGB 13.3 (L) 06/24/2022    HCT 44.3 04/01/2025    HCT 42.0 06/24/2022    MCV 75 (L) 04/01/2025    MCV 73 (L) 06/24/2022     04/01/2025     06/24/2022    INR 1.2 02/08/2021    TSH 2.152 04/01/2025    TSH 3.176 03/08/2022    CHOL 146 04/01/2025    CHOL 122 03/22/2023    HDL 46 04/01/2025    LDLCALC 53.2 (L) 03/22/2023    TRIG 74 04/01/2025    TRIG 154 (H) 03/22/2023    BNP 54 02/04/2024    BNP 45 06/24/2022         BNP   Date Value   02/04/2024 54 PG/ML   02/19/2023 155 PG/ML (H)   06/24/2022 45 pg/mL   02/08/2021 31 PG/ML   02/25/2020 186 PG/ML (H)     INR (no units)   Date Value   02/08/2021 1.2          Assessment:      1. Chest pain, unspecified type    2. Chronic systolic heart failure    3. ICD (implantable cardioverter-defibrillator) in place    4. Mixed hyperlipidemia    5. Ischemic cardiomyopathy    6. PAF (paroxysmal atrial fibrillation)    7. Primary hypertension    8.  Essential hypertension    9. Coronary artery disease of native artery of native heart with stable angina pectoris    10. Stage 3 severe COPD by GOLD classification    11. Cigarette nicotine dependence with other nicotine-induced disorder    12. Other form of dyspnea    13. Multiple subsegmental pulmonary emboli without acute cor pulmonale    14. Uncomplicated alcohol dependence    15. PAD (peripheral artery disease)          Plan:        1. CAD s/p PCI, ICM EF 30% s/p ICD with PAF with more SWIFT/CP, claudication   - titrate meds, asa, statin   - cont Eliquis, BB  - f/u EP/device clinic; lasix PRN 20mg   -  ECHO 5/2024 with lVEF 35%, RV function reduced, grade 1 DD.   - occ CP/ with more SWIFT - order ECHO, and pharm nuclear stress test to r/o ischemia, pt cannot walk on treadmill   - order LE u/s and GAEL     2. HTN, h/o syncope --> hypotensive   - cont diuretics PRN - DC Lasix/Aldactone  - needs to monitor I/O and avoid alc  - dec Coreg dose    3. HLD  - cont statin    4. GERD  - cont PPI    5. COPD  - cont tx per pulm    6. Alc abuse and tobacco abuse  - needs cessation  - drinks 6 pack beer daily and smokes 1 pack - no plans to quit now     If severe CP/SWIFT - needs ER eval and further inpt workup     Visit today included increased complexity associated with the care of the episodic problem CHF addressed and managing the longitudinal care of the patient due to the serious and/or complex managed problem(s) .      Thank you for allowing me to participate in this patient's care. Please do not hesitate to contact me with any questions or concerns. Consult note has been forwarded to the referral physician.

## 2025-04-17 ENCOUNTER — HOSPITAL ENCOUNTER (OUTPATIENT)
Dept: CARDIOLOGY | Facility: HOSPITAL | Age: 75
Discharge: HOME | End: 2025-04-17
Attending: INTERNAL MEDICINE
Payer: MEDICARE

## 2025-04-17 ENCOUNTER — HOSPITAL ENCOUNTER (OUTPATIENT)
Dept: RADIOLOGY | Facility: HOSPITAL | Age: 75
Discharge: HOME | End: 2025-04-17
Attending: INTERNAL MEDICINE
Payer: MEDICARE

## 2025-04-17 ENCOUNTER — RESULTS FOLLOW-UP (OUTPATIENT)
Dept: CARDIOLOGY | Facility: CLINIC | Age: 75
End: 2025-04-17

## 2025-04-17 VITALS
WEIGHT: 211 LBS | SYSTOLIC BLOOD PRESSURE: 113 MMHG | BODY MASS INDEX: 26.24 KG/M2 | DIASTOLIC BLOOD PRESSURE: 60 MMHG | HEIGHT: 75 IN | DIASTOLIC BLOOD PRESSURE: 60 MMHG | BODY MASS INDEX: 26.24 KG/M2 | HEIGHT: 75 IN | SYSTOLIC BLOOD PRESSURE: 113 MMHG | WEIGHT: 211 LBS

## 2025-04-17 VITALS
DIASTOLIC BLOOD PRESSURE: 69 MMHG | WEIGHT: 211 LBS | BODY MASS INDEX: 26.24 KG/M2 | SYSTOLIC BLOOD PRESSURE: 101 MMHG | HEART RATE: 80 BPM | HEIGHT: 75 IN

## 2025-04-17 DIAGNOSIS — R07.9 CHEST PAIN, UNSPECIFIED TYPE: ICD-10-CM

## 2025-04-17 DIAGNOSIS — I10 PRIMARY HYPERTENSION: ICD-10-CM

## 2025-04-17 DIAGNOSIS — I10 ESSENTIAL HYPERTENSION: ICD-10-CM

## 2025-04-17 DIAGNOSIS — I48.0 PAF (PAROXYSMAL ATRIAL FIBRILLATION): ICD-10-CM

## 2025-04-17 DIAGNOSIS — I50.22 CHRONIC SYSTOLIC HEART FAILURE: ICD-10-CM

## 2025-04-17 DIAGNOSIS — E78.2 MIXED HYPERLIPIDEMIA: ICD-10-CM

## 2025-04-17 DIAGNOSIS — J44.9 STAGE 3 SEVERE COPD BY GOLD CLASSIFICATION: ICD-10-CM

## 2025-04-17 DIAGNOSIS — F17.218 CIGARETTE NICOTINE DEPENDENCE WITH OTHER NICOTINE-INDUCED DISORDER: ICD-10-CM

## 2025-04-17 DIAGNOSIS — I25.5 ISCHEMIC CARDIOMYOPATHY: ICD-10-CM

## 2025-04-17 DIAGNOSIS — Z95.810 ICD (IMPLANTABLE CARDIOVERTER-DEFIBRILLATOR) IN PLACE: ICD-10-CM

## 2025-04-17 DIAGNOSIS — I25.118 CORONARY ARTERY DISEASE OF NATIVE ARTERY OF NATIVE HEART WITH STABLE ANGINA PECTORIS: ICD-10-CM

## 2025-04-17 DIAGNOSIS — R06.09 OTHER FORM OF DYSPNEA: ICD-10-CM

## 2025-04-17 DIAGNOSIS — I73.9 PAD (PERIPHERAL ARTERY DISEASE): ICD-10-CM

## 2025-04-17 LAB
AV INDEX (PROSTH): 0.89
AV MEAN GRADIENT: 3 MMHG
AV PEAK GRADIENT: 5 MMHG
AV VALVE AREA BY VELOCITY RATIO: 2.8 CM²
AV VALVE AREA: 3.1 CM²
AV VELOCITY RATIO: 0.82
BSA FOR ECHO PROCEDURE: 2.25 M2
CV ECHO LV RWT: 0.65 CM
CV STRESS BASE HR: 69 BPM
DIASTOLIC BLOOD PRESSURE: 79 MMHG
DOP CALC AO PEAK VEL: 1.1 M/S
DOP CALC AO VTI: 19 CM
DOP CALC LVOT AREA: 3.5 CM2
DOP CALC LVOT DIAMETER: 2.1 CM
DOP CALC LVOT PEAK VEL: 0.9 M/S
DOP CALC LVOT STROKE VOLUME: 58.9 CM3
DOP CALCLVOT PEAK VEL VTI: 17 CM
E WAVE DECELERATION TIME: 178 MSEC
E/A RATIO: 0.69
E/E' RATIO: 12 M/S
ECHO LV POSTERIOR WALL: 1.4 CM (ref 0.6–1.1)
FRACTIONAL SHORTENING: 18.6 % (ref 28–44)
INTERVENTRICULAR SEPTUM: 1.4 CM (ref 0.6–1.1)
IVRT: 128 MSEC
LA MAJOR: 3.4 CM
LA MINOR: 4.2 CM
LA WIDTH: 3.9 CM
LEFT ABI: 1.1
LEFT ANT TIBIAL SYS PSV: 0 CM/S
LEFT ARM BP: 113 MMHG
LEFT ATRIUM SIZE: 3 CM
LEFT ATRIUM VOLUME INDEX: 17 ML/M2
LEFT ATRIUM VOLUME: 37 CM3
LEFT CFA PSV: 127 CM/S
LEFT DORSALIS PEDIS: 124 MMHG
LEFT INTERNAL DIMENSION IN SYSTOLE: 3.5 CM (ref 2.1–4)
LEFT PERONEAL SYS PSV: 82 CM/S
LEFT POPLITEAL PSV: 31 CM/S
LEFT POST TIBIAL SYS PSV: 44 CM/S
LEFT POSTERIOR TIBIAL: 121 MMHG
LEFT PROFUNDA SYS PSV: 67 CM/S
LEFT SUPER FEMORAL DIST SYS PSV: 40 CM/S
LEFT SUPER FEMORAL MID SYS PSV: 58 CM/S
LEFT SUPER FEMORAL OSTIAL SYS PSV: 65 CM/S
LEFT SUPER FEMORAL PROX SYS PSV: 86 CM/S
LEFT TBI: 0.86
LEFT TIB/PER TRUNK SYS PSV: 49 CM/S
LEFT TOE PRESSURE: 97 MMHG
LEFT VENTRICLE DIASTOLIC VOLUME INDEX: 37.5 ML/M2
LEFT VENTRICLE DIASTOLIC VOLUME: 84 ML
LEFT VENTRICLE MASS INDEX: 103.7 G/M2
LEFT VENTRICLE SYSTOLIC VOLUME INDEX: 23.2 ML/M2
LEFT VENTRICLE SYSTOLIC VOLUME: 52 ML
LEFT VENTRICULAR INTERNAL DIMENSION IN DIASTOLE: 4.3 CM (ref 3.5–6)
LEFT VENTRICULAR MASS: 232.2 G
LV LATERAL E/E' RATIO: 14 M/S
LV SEPTAL E/E' RATIO: 11.2 M/S
LVED V (TEICH): 84.06 ML
LVES V (TEICH): 51.63 ML
LVOT MG: 1.93 MMHG
LVOT MV: 0.64 CM/S
MV PEAK A VEL: 0.81 M/S
MV PEAK E VEL: 0.56 M/S
NUC REST EJECTION FRACTION: 25
NUC STRESS EJECTION FRACTION: 25 %
OHS CV CPX 85 PERCENT MAX PREDICTED HEART RATE MALE: 125
OHS CV CPX ESTIMATED METS: 1
OHS CV CPX MAX PREDICTED HEART RATE: 147
OHS CV CPX PATIENT IS FEMALE: 0
OHS CV CPX PATIENT IS MALE: 1
OHS CV CPX PEAK DIASTOLIC BLOOD PRESSURE: 80 MMHG
OHS CV CPX PEAK HEAR RATE: 102 BPM
OHS CV CPX PEAK RATE PRESSURE PRODUCT: NORMAL
OHS CV CPX PEAK SYSTOLIC BLOOD PRESSURE: 120 MMHG
OHS CV CPX PERCENT MAX PREDICTED HEART RATE ACHIEVED: 69
OHS CV CPX RATE PRESSURE PRODUCT PRESENTING: 7176
OHS CV INITIAL DOSE: 9.5 MCG/KG/MIN
OHS CV LEFT LOWER EXTREMITY ABI (NO CALC): 1.1
OHS CV PEAK DOSE: 30.1 MCG/KG/MIN
OHS CV RIGHT ABI LOWER EXTREMITY (NO CALC): 1.1
OHS CV RV/LV RATIO: 0.77 CM
RA MAJOR: 2.97 CM
RA PRESSURE ESTIMATED: 3 MMHG
RA WIDTH: 3.55 CM
RIGHT ABI: 1.1
RIGHT ANT TIBIAL SYS PSV: 6 CM/S
RIGHT ARM BP: 105 MMHG
RIGHT CFA PSV: 166 CM/S
RIGHT DORSALIS PEDIS: 111 MMHG
RIGHT PERONEAL SYS PSV: 74 CM/S
RIGHT POPLITEAL PSV: 35 CM/S
RIGHT POST TIBIAL SYS PSV: 50 CM/S
RIGHT POSTERIOR TIBIAL: 124 MMHG
RIGHT PROFUNDA SYS PSV: 78 CM/S
RIGHT SUPER FEMORAL DIST SYS PSV: 70 CM/S
RIGHT SUPER FEMORAL MID SYS PSV: 75 CM/S
RIGHT SUPER FEMORAL OSTIAL SYS PSV: 75 CM/S
RIGHT SUPER FEMORAL PROX SYS PSV: 64 CM/S
RIGHT TBI: 0.84
RIGHT TIB/PER TRUNK SYS PSV: 83 CM/S
RIGHT TOE PRESSURE: 95 MMHG
RIGHT VENTRICLE DIASTOLIC BASEL DIMENSION: 3.3 CM
RIGHT VENTRICULAR END-DIASTOLIC DIMENSION: 3.27 CM
SINUS: 2.75 CM
STJ: 2.74 CM
STRESS ECHO POST EXERCISE DUR SEC: 52 SECONDS
SYSTOLIC BLOOD PRESSURE: 104 MMHG
TDI LATERAL: 0.04 M/S
TDI SEPTAL: 0.05 M/S
TDI: 0.05 M/S
TRICUSPID ANNULAR PLANE SYSTOLIC EXCURSION: 1.7 CM
Z-SCORE OF LEFT VENTRICULAR DIMENSION IN END DIASTOLE: -6.22
Z-SCORE OF LEFT VENTRICULAR DIMENSION IN END SYSTOLE: -2.59

## 2025-04-17 PROCEDURE — A9502 TC99M TETROFOSMIN: HCPCS | Performed by: INTERNAL MEDICINE

## 2025-04-17 PROCEDURE — 93922 UPR/L XTREMITY ART 2 LEVELS: CPT

## 2025-04-17 PROCEDURE — 93922 UPR/L XTREMITY ART 2 LEVELS: CPT | Mod: 26,,, | Performed by: INTERNAL MEDICINE

## 2025-04-17 PROCEDURE — 93925 LOWER EXTREMITY STUDY: CPT | Mod: 26,,, | Performed by: INTERNAL MEDICINE

## 2025-04-17 PROCEDURE — 93306 TTE W/DOPPLER COMPLETE: CPT

## 2025-04-17 PROCEDURE — 78452 HT MUSCLE IMAGE SPECT MULT: CPT

## 2025-04-17 PROCEDURE — 63600175 PHARM REV CODE 636 W HCPCS: Performed by: INTERNAL MEDICINE

## 2025-04-17 PROCEDURE — 93925 LOWER EXTREMITY STUDY: CPT

## 2025-04-17 PROCEDURE — 25500020 PHARM REV CODE 255: Performed by: INTERNAL MEDICINE

## 2025-04-17 PROCEDURE — 93017 CV STRESS TEST TRACING ONLY: CPT

## 2025-04-17 PROCEDURE — 93306 TTE W/DOPPLER COMPLETE: CPT | Mod: 26,,, | Performed by: INTERNAL MEDICINE

## 2025-04-17 RX ORDER — REGADENOSON 0.08 MG/ML
0.4 INJECTION, SOLUTION INTRAVENOUS
Status: COMPLETED | OUTPATIENT
Start: 2025-04-17 | End: 2025-04-17

## 2025-04-17 RX ADMIN — TETROFOSMIN 30.1 MILLICURIE: 1.38 INJECTION, POWDER, LYOPHILIZED, FOR SOLUTION INTRAVENOUS at 01:04

## 2025-04-17 RX ADMIN — REGADENOSON 0.4 MG: 0.08 INJECTION, SOLUTION INTRAVENOUS at 01:04

## 2025-04-17 RX ADMIN — HUMAN ALBUMIN MICROSPHERES AND PERFLUTREN 0.11 MG: 10; .22 INJECTION, SOLUTION INTRAVENOUS at 12:04

## 2025-04-17 RX ADMIN — TETROFOSMIN 9.5 MILLICURIE: 1.38 INJECTION, POWDER, LYOPHILIZED, FOR SOLUTION INTRAVENOUS at 11:04

## 2025-04-17 NOTE — NURSING NOTE
Pt presented for an echocardiogram today.  This study was performed in conjunction with Optison contrast agent because of poor endocardial visualization.  Procedure was explained to the patient, he verbalized understanding and signed the consent.  IV, 24ga x 1 attempts, was started in the L arm using aseptic technique.  See MAR for details.  Patient tolerated the procedure well.

## 2025-05-01 ENCOUNTER — OFFICE VISIT (OUTPATIENT)
Dept: INTERNAL MEDICINE | Facility: CLINIC | Age: 75
End: 2025-05-01
Payer: MEDICARE

## 2025-05-01 VITALS
DIASTOLIC BLOOD PRESSURE: 64 MMHG | TEMPERATURE: 97 F | BODY MASS INDEX: 26.24 KG/M2 | RESPIRATION RATE: 18 BRPM | SYSTOLIC BLOOD PRESSURE: 128 MMHG | OXYGEN SATURATION: 98 % | HEIGHT: 75 IN | HEART RATE: 78 BPM | WEIGHT: 211 LBS

## 2025-05-01 DIAGNOSIS — Z95.810 ICD (IMPLANTABLE CARDIOVERTER-DEFIBRILLATOR) IN PLACE: ICD-10-CM

## 2025-05-01 DIAGNOSIS — I50.22 CHRONIC SYSTOLIC HEART FAILURE: Primary | ICD-10-CM

## 2025-05-01 DIAGNOSIS — J44.9 STAGE 3 SEVERE COPD BY GOLD CLASSIFICATION: ICD-10-CM

## 2025-05-01 PROCEDURE — 3044F HG A1C LEVEL LT 7.0%: CPT | Mod: CPTII,HCNC,S$GLB, | Performed by: NURSE PRACTITIONER

## 2025-05-01 PROCEDURE — 1159F MED LIST DOCD IN RCRD: CPT | Mod: CPTII,HCNC,S$GLB, | Performed by: NURSE PRACTITIONER

## 2025-05-01 PROCEDURE — 1101F PT FALLS ASSESS-DOCD LE1/YR: CPT | Mod: CPTII,HCNC,S$GLB, | Performed by: NURSE PRACTITIONER

## 2025-05-01 PROCEDURE — 1125F AMNT PAIN NOTED PAIN PRSNT: CPT | Mod: CPTII,HCNC,S$GLB, | Performed by: NURSE PRACTITIONER

## 2025-05-01 PROCEDURE — 3074F SYST BP LT 130 MM HG: CPT | Mod: CPTII,HCNC,S$GLB, | Performed by: NURSE PRACTITIONER

## 2025-05-01 PROCEDURE — 4010F ACE/ARB THERAPY RXD/TAKEN: CPT | Mod: CPTII,HCNC,S$GLB, | Performed by: NURSE PRACTITIONER

## 2025-05-01 PROCEDURE — 3078F DIAST BP <80 MM HG: CPT | Mod: CPTII,HCNC,S$GLB, | Performed by: NURSE PRACTITIONER

## 2025-05-01 PROCEDURE — 99999 PR PBB SHADOW E&M-EST. PATIENT-LVL V: CPT | Mod: PBBFAC,HCNC,, | Performed by: NURSE PRACTITIONER

## 2025-05-01 PROCEDURE — 3288F FALL RISK ASSESSMENT DOCD: CPT | Mod: CPTII,HCNC,S$GLB, | Performed by: NURSE PRACTITIONER

## 2025-05-01 PROCEDURE — 99214 OFFICE O/P EST MOD 30 MIN: CPT | Mod: HCNC,S$GLB,, | Performed by: NURSE PRACTITIONER

## 2025-05-01 PROCEDURE — 3008F BODY MASS INDEX DOCD: CPT | Mod: CPTII,HCNC,S$GLB, | Performed by: NURSE PRACTITIONER

## 2025-05-01 RX ORDER — BUPROPION HYDROCHLORIDE 300 MG/1
300 TABLET ORAL DAILY
Qty: 30 TABLET | Refills: 11 | Status: SHIPPED | OUTPATIENT
Start: 2025-05-01 | End: 2026-05-01

## 2025-05-01 NOTE — PROGRESS NOTES
Subjective:       Patient ID: Raghavendra Luz is a 75 y.o. male.    Chief Complaint: Follow-up (Pt presents to clinic for follow-up.)    HPI    Pt here for follow up on depression  Does not feel like wellbutrin 150 mg is effective. No unwanted SE  Still declines seeing a therapist  No HI/SI    Needs help at home. Severe chf, overdue pulm   Past Medical History:   Diagnosis Date    Asthma     Cataract     CHF (congestive heart failure)     COPD (chronic obstructive pulmonary disease)     GERD (gastroesophageal reflux disease)     Glaucoma     Hypertension      Past Surgical History:   Procedure Laterality Date    ANKLE FRACTURE SURGERY Left     COLONOSCOPY      COLONOSCOPY N/A 5/18/2022    Procedure: COLONOSCOPY;  Surgeon: Sandra Blake MD;  Location: [facility] ENDO;  Service: Gastroenterology;  Laterality: N/A;    COLONOSCOPY N/A 9/7/2022    Procedure: COLONOSCOPY;  Surgeon: Sandra Blake MD;  Location: [facility] ENDO;  Service: Gastroenterology;  Laterality: N/A;    CORONARY ANGIOPLASTY WITH STENT PLACEMENT       Social History[1]  Review of patient's allergies indicates:   Allergen Reactions    Iodine Anaphylaxis     Shellfish allergy - pt confirms can take Iodine products.    Shellfish containing products Anaphylaxis    Olmesartan Hives     Current Outpatient Medications   Medication Sig    albuterol (PROVENTIL/VENTOLIN HFA) 90 mcg/actuation inhaler Inhale 2 puffs into the lungs every 4 (four) hours as needed for Wheezing or Shortness of Breath.    aspirin (ECOTRIN) 81 MG EC tablet Take 1 tablet (81 mg total) by mouth once daily.    atorvastatin (LIPITOR) 80 MG tablet Take 1 tablet (80 mg total) by mouth once daily.    bimatoprost (LUMIGAN) 0.01 % Drop Place 1 drop into both eyes every evening.    brimonidine 0.2% (ALPHAGAN) 0.2 % Drop Place 1 drop into both eyes every 12 (twelve) hours.    carvediloL (COREG) 25 MG tablet Take 1 tablet (25 mg total) by mouth 2 (two) times daily with meals.    ELIQUIS 5 mg Tab  Take 1 tablet (5 mg total) by mouth 2 (two) times daily.    furosemide (LASIX) 20 MG tablet Take 1 tablet (20 mg total) by mouth daily as needed (edema, swelling, fluid build up).    lisinopriL (PRINIVIL,ZESTRIL) 40 MG tablet Take 1 tablet (40 mg total) by mouth once daily.    netarsudiL-latanoprost (ROCKLATAN) 0.02-0.005 % Drop Place 1 drop into both eyes every evening.    nitroGLYCERIN (NITROSTAT) 0.4 MG SL tablet PLACE 1 TABLET UNDER THE TONGUE EVERY 5 (FIVE) MINUTES AS NEEDED FOR CHEST PAIN.MAX 3 DOSES IN15 MIN    pantoprazole (PROTONIX) 40 MG tablet Take 1 tablet (40 mg total) by mouth once daily.    spironolactone (ALDACTONE) 50 MG tablet Take 1 tablet (50 mg total) by mouth once daily.    tiotropium-olodateroL (STIOLTO RESPIMAT) 2.5-2.5 mcg/actuation Mist Inhale 1 puff into the lungs once daily. Controller    traMADoL (ULTRAM) 50 mg tablet Take 1 tablet (50 mg total) by mouth every 6 (six) hours as needed.    buPROPion (WELLBUTRIN XL) 300 MG 24 hr tablet Take 1 tablet (300 mg total) by mouth once daily.     No current facility-administered medications for this visit.           Review of Systems   Constitutional:  Negative for activity change, appetite change, chills, diaphoresis, fatigue, fever and unexpected weight change.   HENT:  Negative for congestion, ear pain, postnasal drip, rhinorrhea, sinus pressure, sinus pain, sneezing, sore throat, tinnitus, trouble swallowing and voice change.    Eyes:  Negative for photophobia, pain and visual disturbance.   Respiratory:  Negative for cough, chest tightness and wheezing.    Cardiovascular:  Negative for chest pain, palpitations and leg swelling.   Gastrointestinal:  Negative for abdominal distention, abdominal pain, constipation, diarrhea, nausea and vomiting.   Genitourinary:  Negative for decreased urine volume, difficulty urinating, dysuria, flank pain, frequency, hematuria and urgency.   Musculoskeletal:  Negative for arthralgias, back pain, joint swelling,  neck pain and neck stiffness.   Allergic/Immunologic: Negative for immunocompromised state.   Neurological:  Negative for dizziness, tremors, seizures, syncope, facial asymmetry, speech difficulty, weakness, light-headedness, numbness and headaches.   Hematological:  Negative for adenopathy. Does not bruise/bleed easily.   Psychiatric/Behavioral:  Positive for dysphoric mood. Negative for confusion and sleep disturbance.        Objective:      Physical Exam  Vitals reviewed.   Neurological:      Mental Status: He is alert and oriented to person, place, and time.         Assessment:     Vitals:    05/01/25 1123   BP: 128/64   Pulse: 78   Resp: 18   Temp: 97 °F (36.1 °C)         1. Chronic systolic heart failure    2. ICD (implantable cardioverter-defibrillator) in place    3. Stage 3 severe COPD by GOLD classification        Plan:   Chronic systolic heart failure  -     Ambulatory referral/consult to Outpatient Case Management    ICD (implantable cardioverter-defibrillator) in place  -     Ambulatory referral/consult to Outpatient Case Management    Stage 3 severe COPD by GOLD classification  -     Ambulatory referral/consult to Outpatient Case Management  -     Ambulatory referral/consult to Pulmonology; Future; Expected date: 05/08/2025    Other orders  -     buPROPion (WELLBUTRIN XL) 300 MG 24 hr tablet; Take 1 tablet (300 mg total) by mouth once daily.  Dispense: 30 tablet; Refill: 11        Wellbutrin increase to 300 mg  Case mgt for home assistance  See pulm keep follow up appts with cards       [1]   Social History  Socioeconomic History    Marital status: Single   Tobacco Use    Smoking status: Every Day     Current packs/day: 1.00     Average packs/day: 1 pack/day for 55.3 years (55.3 ttl pk-yrs)     Types: Cigarettes     Start date: 1/1/1970    Smokeless tobacco: Never   Substance and Sexual Activity    Alcohol use: Yes     Alcohol/week: 6.0 standard drinks of alcohol     Types: 6 Cans of beer per week      Comment: six pack daily    Drug use: Never    Sexual activity: Not Currently   Social History Narrative    Lives alone, no HH as of 03/2022. No other smokers or pets in household.     Social Drivers of Health     Financial Resource Strain: Medium Risk (7/20/2023)    Overall Financial Resource Strain (CARDIA)     Difficulty of Paying Living Expenses: Somewhat hard   Food Insecurity: Food Insecurity Present (7/20/2023)    Hunger Vital Sign     Worried About Running Out of Food in the Last Year: Sometimes true     Ran Out of Food in the Last Year: Sometimes true   Transportation Needs: No Transportation Needs (7/20/2023)    PRAPARE - Transportation     Lack of Transportation (Medical): No     Lack of Transportation (Non-Medical): No   Physical Activity: Inactive (7/20/2023)    Exercise Vital Sign     Days of Exercise per Week: 0 days     Minutes of Exercise per Session: 0 min   Stress: No Stress Concern Present (7/20/2023)    [employer] - Occupational Stress Questionnaire     Feeling of Stress : Only a little   Housing Stability: Unknown (7/20/2023)    Housing Stability Vital Sign     Unable to Pay for Housing in the Last Year: No     Number of Places Lived in the Last Year: 1

## 2025-05-20 ENCOUNTER — OUTPATIENT CASE MANAGEMENT (OUTPATIENT)
Dept: ADMINISTRATIVE | Facility: OTHER | Age: 75
End: 2025-05-20
Payer: MEDICARE

## 2025-05-28 DIAGNOSIS — I73.9 PAD (PERIPHERAL ARTERY DISEASE): Primary | ICD-10-CM

## 2025-07-08 DIAGNOSIS — I50.22 CHRONIC SYSTOLIC HEART FAILURE: ICD-10-CM

## 2025-07-08 DIAGNOSIS — I10 ESSENTIAL HYPERTENSION: ICD-10-CM

## 2025-07-08 DIAGNOSIS — Z00.00 ROUTINE ADULT HEALTH MAINTENANCE: Primary | ICD-10-CM

## 2025-07-08 DIAGNOSIS — Z95.810 ICD (IMPLANTABLE CARDIOVERTER-DEFIBRILLATOR) IN PLACE: ICD-10-CM

## 2025-07-08 DIAGNOSIS — I25.10 ATHEROSCLEROSIS OF NATIVE CORONARY ARTERY OF NATIVE HEART WITHOUT ANGINA PECTORIS: ICD-10-CM

## 2025-07-08 DIAGNOSIS — I25.5 ISCHEMIC CARDIOMYOPATHY: ICD-10-CM

## 2025-07-09 ENCOUNTER — HOSPITAL ENCOUNTER (OUTPATIENT)
Dept: CARDIOLOGY | Facility: HOSPITAL | Age: 75
Discharge: HOME | End: 2025-07-09
Attending: INTERNAL MEDICINE
Payer: MEDICARE

## 2025-07-09 ENCOUNTER — OFFICE VISIT (OUTPATIENT)
Dept: CARDIOLOGY | Facility: CLINIC | Age: 75
End: 2025-07-09
Payer: MEDICARE

## 2025-07-09 VITALS — OXYGEN SATURATION: 96 % | HEART RATE: 70 BPM | SYSTOLIC BLOOD PRESSURE: 130 MMHG | DIASTOLIC BLOOD PRESSURE: 60 MMHG

## 2025-07-09 DIAGNOSIS — I25.118 CORONARY ARTERY DISEASE OF NATIVE ARTERY OF NATIVE HEART WITH STABLE ANGINA PECTORIS: ICD-10-CM

## 2025-07-09 DIAGNOSIS — I10 PRIMARY HYPERTENSION: ICD-10-CM

## 2025-07-09 DIAGNOSIS — I10 ESSENTIAL HYPERTENSION: ICD-10-CM

## 2025-07-09 DIAGNOSIS — Z95.810 ICD (IMPLANTABLE CARDIOVERTER-DEFIBRILLATOR) IN PLACE: ICD-10-CM

## 2025-07-09 DIAGNOSIS — F17.218 CIGARETTE NICOTINE DEPENDENCE WITH OTHER NICOTINE-INDUCED DISORDER: ICD-10-CM

## 2025-07-09 DIAGNOSIS — I50.22 CHRONIC SYSTOLIC HEART FAILURE: ICD-10-CM

## 2025-07-09 DIAGNOSIS — I48.0 PAF (PAROXYSMAL ATRIAL FIBRILLATION): ICD-10-CM

## 2025-07-09 DIAGNOSIS — I25.5 ISCHEMIC CARDIOMYOPATHY: ICD-10-CM

## 2025-07-09 DIAGNOSIS — Z00.00 ROUTINE ADULT HEALTH MAINTENANCE: ICD-10-CM

## 2025-07-09 DIAGNOSIS — I25.10 ATHEROSCLEROSIS OF NATIVE CORONARY ARTERY OF NATIVE HEART WITHOUT ANGINA PECTORIS: ICD-10-CM

## 2025-07-09 DIAGNOSIS — J44.9 STAGE 3 SEVERE COPD BY GOLD CLASSIFICATION: ICD-10-CM

## 2025-07-09 DIAGNOSIS — R06.09 OTHER FORM OF DYSPNEA: ICD-10-CM

## 2025-07-09 DIAGNOSIS — I26.94 MULTIPLE SUBSEGMENTAL PULMONARY EMBOLI WITHOUT ACUTE COR PULMONALE: ICD-10-CM

## 2025-07-09 DIAGNOSIS — I73.9 PAD (PERIPHERAL ARTERY DISEASE): Primary | ICD-10-CM

## 2025-07-09 DIAGNOSIS — E78.2 MIXED HYPERLIPIDEMIA: ICD-10-CM

## 2025-07-09 LAB
OHS QRS DURATION: 144 MS
OHS QTC CALCULATION: 404 MS

## 2025-07-09 PROCEDURE — 1159F MED LIST DOCD IN RCRD: CPT | Mod: CPTII,HCNC,S$GLB, | Performed by: INTERNAL MEDICINE

## 2025-07-09 PROCEDURE — 1160F RVW MEDS BY RX/DR IN RCRD: CPT | Mod: CPTII,HCNC,S$GLB, | Performed by: INTERNAL MEDICINE

## 2025-07-09 PROCEDURE — 3288F FALL RISK ASSESSMENT DOCD: CPT | Mod: CPTII,HCNC,S$GLB, | Performed by: INTERNAL MEDICINE

## 2025-07-09 PROCEDURE — 3078F DIAST BP <80 MM HG: CPT | Mod: CPTII,HCNC,S$GLB, | Performed by: INTERNAL MEDICINE

## 2025-07-09 PROCEDURE — 3075F SYST BP GE 130 - 139MM HG: CPT | Mod: CPTII,HCNC,S$GLB, | Performed by: INTERNAL MEDICINE

## 2025-07-09 PROCEDURE — 4010F ACE/ARB THERAPY RXD/TAKEN: CPT | Mod: CPTII,HCNC,S$GLB, | Performed by: INTERNAL MEDICINE

## 2025-07-09 PROCEDURE — 1101F PT FALLS ASSESS-DOCD LE1/YR: CPT | Mod: CPTII,HCNC,S$GLB, | Performed by: INTERNAL MEDICINE

## 2025-07-09 PROCEDURE — 99999 PR PBB SHADOW E&M-EST. PATIENT-LVL III: CPT | Mod: PBBFAC,HCNC,, | Performed by: INTERNAL MEDICINE

## 2025-07-09 PROCEDURE — 99214 OFFICE O/P EST MOD 30 MIN: CPT | Mod: HCNC,S$GLB,, | Performed by: INTERNAL MEDICINE

## 2025-07-09 PROCEDURE — 93005 ELECTROCARDIOGRAM TRACING: CPT | Mod: HCNC

## 2025-07-09 PROCEDURE — G2211 COMPLEX E/M VISIT ADD ON: HCPCS | Mod: HCNC,S$GLB,, | Performed by: INTERNAL MEDICINE

## 2025-07-09 PROCEDURE — 93010 ELECTROCARDIOGRAM REPORT: CPT | Mod: HCNC,,, | Performed by: INTERNAL MEDICINE

## 2025-07-09 PROCEDURE — 3044F HG A1C LEVEL LT 7.0%: CPT | Mod: CPTII,HCNC,S$GLB, | Performed by: INTERNAL MEDICINE

## 2025-07-09 RX ORDER — APIXABAN 5 MG/1
5 TABLET, FILM COATED ORAL 2 TIMES DAILY
Qty: 180 TABLET | Refills: 1 | Status: SHIPPED | OUTPATIENT
Start: 2025-07-09

## 2025-07-09 RX ORDER — LISINOPRIL 40 MG/1
40 TABLET ORAL DAILY
Qty: 90 TABLET | Refills: 3 | Status: SHIPPED | OUTPATIENT
Start: 2025-07-09

## 2025-07-09 RX ORDER — ASPIRIN 81 MG/1
81 TABLET ORAL DAILY
Qty: 90 TABLET | Refills: 3 | Status: SHIPPED | OUTPATIENT
Start: 2025-07-09

## 2025-07-09 NOTE — PROGRESS NOTES
Subjective:   Patient ID:  Raghavendra Luz is a 75 y.o. male who presents for cardiac consult of No chief complaint on file.      HPI  The patient came in today for cardiac consult of No chief complaint on file.      Raghavendra Luz is a 75 y.o. male h/o ETOH and tobacco abuse, HTN, COPD, HFrEF (EF 30-35%, s/p ICD, CAD s/p PCI to RCA, afib here for CV follow up.       3/6/24  Pt here for recent follow up post [facility] ER for syncope, ROASLVA, received IVF. Saw Dr. Morales last year has not followed up.   BNP was neg. No recent ECHO or CV workup done.     BP is low 80/64. HR 59. BMI 25 - 204 lbs  He takes Eliquis, Coreg, asa.   His brother had infusion of DBT  at home.   ECG - sinus vj, RBBB, infarct, poor RWP       25  Follow up with me since 3/2024.   Last ECHO 2024 with lVEF 35%, RV function reduced, grade 1 DD.     Per notes  Hello this is his son in law He said he dont have a open wound on his feet and his leg hurt when he stand up and sit down   BP low normal. HR 80s. BMI 26 - 211 lbs   He still drinks a 6 pack of beer daily and smokes daily - does not want to quit.   He has lost both kids last year.   He gets chest pain at rest, usually at rest.     25  BP and HR stable.  ECHO 2025 with LVEF 25-30%.   Nuc stress neg for ischemia , scar noted, EF 25%     No cardiac monitor results found for the past 12 months     Results for orders placed during the hospital encounter of 25    Echo    Interpretation Summary    Left Ventricle: The left ventricle is normal in size. Moderately increased wall thickness. There is moderate concentric hypertrophy. Severe global hypokinesis and regional wall motion abnormalities present. There is severely reduced systolic function with a visually estimated ejection fraction of 25 - 30%. Grade I diastolic dysfunction.    Right Ventricle: The right ventricle is normal in size. Wall thickness is normal. Systolic function is reduced.    Mitral Valve: Mildly thickened  leaflets. There is mild mitral annular calcification.    Pulmonary Artery: Pulmonary artery pressure could not be accurately determined.    IVC/SVC: Normal venous pressure at 3 mmHg.      Results for orders placed during the hospital encounter of 04/17/25    Nuclear Stress - Cardiology Interpreted    Interpretation Summary    Abnormal myocardial perfusion scan.    There are two significant perfusion abnormalities.    Perfusion Abnormality #1 - There is a severe intensity, large sized, fixed perfusion abnormality consistent with scar in the mid to apical anterior and anteroseptal wall(s).    Perfusion Abnormality #2 - There is a large sized, severe intensity, fixed perfusion abnormality consistent with scar in the mid to apical inferior wall(s).    There are no other significant perfusion abnormalities.    The gated perfusion images showed an ejection fraction of 25% at rest. The gated perfusion images showed an ejection fraction of 25% post stress.    There is severe global hypokinesis at rest and post-stress. TID is 1.45    The ECG portion of the study is negative for ischemia.    The patient reported no chest pain during the stress test.      Results for orders placed during the hospital encounter of 05/02/24    Echo    Interpretation Summary    Left Ventricle: The left ventricle is normal in size. Normal wall thickness. There is concentric remodeling. Regional wall motion abnormalities present. There is reduced systolic function. Ejection fraction by visual approximation is 35%. Grade I diastolic dysfunction.    Right Ventricle: Right ventricle was not well visualized due to poor acoustic window. Systolic function is reduced.    Right Atrium: Lead present in the right atrium.      Past Medical History:   Diagnosis Date    Asthma     Cataract     CHF (congestive heart failure)     COPD (chronic obstructive pulmonary disease)     GERD (gastroesophageal reflux disease)     Glaucoma     Hypertension        Past  Surgical History:   Procedure Laterality Date    ANKLE FRACTURE SURGERY Left     COLONOSCOPY      COLONOSCOPY N/A 5/18/2022    Procedure: COLONOSCOPY;  Surgeon: Sandra Blake MD;  Location: [facility] ENDO;  Service: Gastroenterology;  Laterality: N/A;    COLONOSCOPY N/A 9/7/2022    Procedure: COLONOSCOPY;  Surgeon: Sandra Blake MD;  Location: [facility] ENDO;  Service: Gastroenterology;  Laterality: N/A;    CORONARY ANGIOPLASTY WITH STENT PLACEMENT         Social History     Tobacco Use    Smoking status: Every Day     Current packs/day: 1.00     Average packs/day: 1 pack/day for 55.5 years (55.5 ttl pk-yrs)     Types: Cigarettes     Start date: 1/1/1970    Smokeless tobacco: Never   Substance Use Topics    Alcohol use: Yes     Alcohol/week: 6.0 standard drinks of alcohol     Types: 6 Cans of beer per week     Comment: six pack daily    Drug use: Never       Family History   Problem Relation Name Age of Onset    Heart disease Mother      Ovarian cancer Mother      Diabetes Father      Heart disease Father      Brain Hemorrhage Sister      Heart disease Brother         Patient's Medications   New Prescriptions    No medications on file   Previous Medications    ALBUTEROL (PROVENTIL/VENTOLIN HFA) 90 MCG/ACTUATION INHALER    Inhale 2 puffs into the lungs every 4 (four) hours as needed for Wheezing or Shortness of Breath.    ATORVASTATIN (LIPITOR) 80 MG TABLET    Take 1 tablet (80 mg total) by mouth once daily.    BIMATOPROST (LUMIGAN) 0.01 % DROP    Place 1 drop into both eyes every evening.    BRIMONIDINE 0.2% (ALPHAGAN) 0.2 % DROP    Place 1 drop into both eyes every 12 (twelve) hours.    BUPROPION (WELLBUTRIN XL) 300 MG 24 HR TABLET    Take 1 tablet (300 mg total) by mouth once daily.    CARVEDILOL (COREG) 25 MG TABLET    Take 1 tablet (25 mg total) by mouth 2 (two) times daily with meals.    FUROSEMIDE (LASIX) 20 MG TABLET    Take 1 tablet (20 mg total) by mouth daily as needed (edema, swelling, fluid build up).     NETARSUDIL-LATANOPROST (ROCKLATAN) 0.02-0.005 % DROP    Place 1 drop into both eyes every evening.    NITROGLYCERIN (NITROSTAT) 0.4 MG SL TABLET    PLACE 1 TABLET UNDER THE TONGUE EVERY 5 (FIVE) MINUTES AS NEEDED FOR CHEST PAIN.MAX 3 DOSES IN15 MIN    PANTOPRAZOLE (PROTONIX) 40 MG TABLET    Take 1 tablet (40 mg total) by mouth once daily.    SPIRONOLACTONE (ALDACTONE) 50 MG TABLET    Take 1 tablet (50 mg total) by mouth once daily.    TIOTROPIUM-OLODATEROL (STIOLTO RESPIMAT) 2.5-2.5 MCG/ACTUATION MIST    Inhale 1 puff into the lungs once daily. Controller    TRAMADOL (ULTRAM) 50 MG TABLET    Take 1 tablet (50 mg total) by mouth every 6 (six) hours as needed.   Modified Medications    Modified Medication Previous Medication    ASPIRIN (ECOTRIN) 81 MG EC TABLET aspirin (ECOTRIN) 81 MG EC tablet       Take 1 tablet (81 mg total) by mouth once daily.    Take 1 tablet (81 mg total) by mouth once daily.    ELIQUIS 5 MG TAB ELIQUIS 5 mg Tab       Take 1 tablet (5 mg total) by mouth 2 (two) times daily.    Take 1 tablet (5 mg total) by mouth 2 (two) times daily.    LISINOPRIL (PRINIVIL,ZESTRIL) 40 MG TABLET lisinopriL (PRINIVIL,ZESTRIL) 40 MG tablet       Take 1 tablet (40 mg total) by mouth once daily.    Take 1 tablet (40 mg total) by mouth once daily.   Discontinued Medications    No medications on file       Review of Systems   Constitutional:  Positive for malaise/fatigue.   HENT: Negative.     Eyes: Negative.    Respiratory:  Positive for shortness of breath.    Cardiovascular:  Positive for chest pain, palpitations and claudication.   Gastrointestinal: Negative.    Genitourinary: Negative.    Musculoskeletal:  Positive for back pain and joint pain.   Skin: Negative.    Neurological:  Positive for dizziness and loss of consciousness.   Endo/Heme/Allergies: Negative.    Psychiatric/Behavioral: Negative.     All 12 systems otherwise negative.      Wt Readings from Last 3 Encounters:   05/01/25 95.7 kg (211 lb)    04/17/25 95.7 kg (211 lb)   04/17/25 95.7 kg (211 lb)     Temp Readings from Last 3 Encounters:   05/01/25 97 °F (36.1 °C)   04/01/25 97.7 °F (36.5 °C) (Tympanic)   06/18/24 97.8 °F (36.6 °C) (Oral)     BP Readings from Last 3 Encounters:   07/09/25 130/60   05/01/25 128/64   04/17/25 113/60     Pulse Readings from Last 3 Encounters:   07/09/25 70   05/01/25 78   04/17/25 80       /60 (BP Location: Left arm, Patient Position: Sitting)   Pulse 70   SpO2 96%     Objective:   Physical Exam  Vitals and nursing note reviewed.   Constitutional:       General: He is not in acute distress.     Appearance: He is well-developed. He is not diaphoretic.   HENT:      Head: Normocephalic and atraumatic.      Nose: Nose normal.   Eyes:      General: No scleral icterus.     Conjunctiva/sclera: Conjunctivae normal.   Neck:      Thyroid: No thyromegaly.      Vascular: No JVD.   Cardiovascular:      Rate and Rhythm: Normal rate and regular rhythm.      Heart sounds: S1 normal and S2 normal. Murmur heard.      No friction rub. No gallop. No S3 or S4 sounds.   Pulmonary:      Effort: Pulmonary effort is normal. No respiratory distress.      Breath sounds: Normal breath sounds. No stridor. No wheezing or rales.   Chest:      Chest wall: No tenderness.   Abdominal:      General: Bowel sounds are normal. There is no distension.      Palpations: Abdomen is soft. There is no mass.      Tenderness: There is no abdominal tenderness. There is no rebound.   Genitourinary:     Comments: Deferred  Musculoskeletal:         General: No tenderness or deformity. Normal range of motion.      Cervical back: Normal range of motion and neck supple.   Lymphadenopathy:      Cervical: No cervical adenopathy.   Skin:     General: Skin is warm and dry.      Coloration: Skin is not pale.      Findings: No erythema or rash.   Neurological:      Mental Status: He is alert and oriented to person, place, and time.      Motor: No abnormal muscle tone.       Coordination: Coordination normal.   Psychiatric:         Behavior: Behavior normal.         Thought Content: Thought content normal.         Judgment: Judgment normal.         Lab Results   Component Value Date     (L) 04/01/2025     (L) 03/22/2023    K 5.2 (H) 04/01/2025    K 4.9 03/22/2023    CL 96 04/01/2025     03/22/2023    CO2 24 04/01/2025    CO2 22 (L) 03/22/2023    BUN 7 (L) 04/01/2025    CREATININE 1.1 04/01/2025    GLU 92 04/01/2025    GLU 95 03/22/2023    HGBA1C 5.8 (H) 04/01/2025    HGBA1C 5.9 (H) 03/22/2023    AST 26 04/01/2025    AST 22 03/22/2023    ALT 41 04/01/2025    ALT 37 03/22/2023    ALBUMIN 4.3 04/01/2025    ALBUMIN 4.4 03/22/2023    PROT 7.8 04/01/2025    PROT 7.5 03/22/2023    BILITOT 0.4 04/01/2025    BILITOT 0.4 03/22/2023    WBC 5.08 04/01/2025    HGB 14.2 04/01/2025    HGB 13.3 (L) 06/24/2022    HCT 44.3 04/01/2025    HCT 42.0 06/24/2022    MCV 75 (L) 04/01/2025    MCV 73 (L) 06/24/2022     04/01/2025     06/24/2022    INR 1.2 02/08/2021    TSH 2.152 04/01/2025    TSH 3.176 03/08/2022    CHOL 146 04/01/2025    CHOL 122 03/22/2023    HDL 46 04/01/2025    LDLCALC 85.2 04/01/2025    TRIG 74 04/01/2025    TRIG 154 (H) 03/22/2023    BNP 54 02/04/2024    BNP 45 06/24/2022         BNP   Date Value   02/04/2024 54 PG/ML   02/19/2023 155 PG/ML (H)   06/24/2022 45 pg/mL   02/08/2021 31 PG/ML   02/25/2020 186 PG/ML (H)     INR (no units)   Date Value   02/08/2021 1.2          Assessment:      1. PAD (peripheral artery disease)    2. Essential hypertension    3. ICD (implantable cardioverter-defibrillator) in place    4. Chronic systolic heart failure    5. Atherosclerosis of native coronary artery of native heart without angina pectoris    6. Ischemic cardiomyopathy    7. Mixed hyperlipidemia    8. Primary hypertension    9. Coronary artery disease of native artery of native heart with stable angina pectoris    10. PAF (paroxysmal atrial fibrillation)    11.  Cigarette nicotine dependence with other nicotine-induced disorder    12. Other form of dyspnea    13. Stage 3 severe COPD by GOLD classification    14. Multiple subsegmental pulmonary emboli without acute cor pulmonale            Plan:        1. CAD s/p PCI, ICM EF 30% s/p ICD with PAF with more SWIFT/CP, claudication with PAD   - titrate meds, asa, statin   - cont Eliquis, BB  - f/u EP/device clinic; lasix PRN 20mg   -  ECHO 5/2024 with lVEF 35%, RV function reduced, grade 1 DD.   - Moderately decreased flow in leg arteries with blockages - need to follow up with Dr. Soliman as scheduled to discuss any leg arterial procedures needed to fix/treat the leg arteries.   ECHO 4/2025 with LVEF 25-30%.   Nuc stress neg for ischemia , scar noted, EF 25%    2. HTN, h/o syncope --> hypotensive -   - cont diuretics PRN   - needs to monitor I/O and avoid alc  -cannot start Entesrto now due to hives to Losartan     3. HLD  - cont statin    4. GERD  - cont PPI    5. COPD  - cont tx per pulm    6. Alc abuse and tobacco abuse  - needs cessation  - drinks 6 pack beer daily and smokes 1 pack - no plans to quit now     If severe CP/SWIFT - needs ER eval and further inpt workup     Visit today included increased complexity associated with the care of the episodic problem CHF addressed and managing the longitudinal care of the patient due to the serious and/or complex managed problem(s) .      Thank you for allowing me to participate in this patient's care. Please do not hesitate to contact me with any questions or concerns. Consult note has been forwarded to the referral physician.

## 2025-08-12 PROBLEM — Z01.811 PRE-OPERATIVE RESPIRATORY EXAMINATION: Status: RESOLVED | Noted: 2022-04-20 | Resolved: 2025-08-12

## 2025-08-13 ENCOUNTER — PATIENT MESSAGE (OUTPATIENT)
Dept: INTERNAL MEDICINE | Facility: CLINIC | Age: 75
End: 2025-08-13

## 2025-08-13 ENCOUNTER — OFFICE VISIT (OUTPATIENT)
Dept: CARDIOLOGY | Facility: CLINIC | Age: 75
End: 2025-08-13
Payer: MEDICARE

## 2025-08-13 VITALS
BODY MASS INDEX: 25.21 KG/M2 | SYSTOLIC BLOOD PRESSURE: 108 MMHG | HEART RATE: 84 BPM | DIASTOLIC BLOOD PRESSURE: 70 MMHG | WEIGHT: 201.75 LBS | OXYGEN SATURATION: 98 %

## 2025-08-13 DIAGNOSIS — I25.3 ANEURYSM OF LEFT VENTRICLE OF HEART: ICD-10-CM

## 2025-08-13 DIAGNOSIS — E78.2 MIXED HYPERLIPIDEMIA: ICD-10-CM

## 2025-08-13 DIAGNOSIS — I25.5 ISCHEMIC CARDIOMYOPATHY: ICD-10-CM

## 2025-08-13 DIAGNOSIS — Z95.810 ICD (IMPLANTABLE CARDIOVERTER-DEFIBRILLATOR) IN PLACE: ICD-10-CM

## 2025-08-13 DIAGNOSIS — Z79.01 CHRONIC ANTICOAGULATION: ICD-10-CM

## 2025-08-13 DIAGNOSIS — I26.94 MULTIPLE SUBSEGMENTAL PULMONARY EMBOLI WITHOUT ACUTE COR PULMONALE: ICD-10-CM

## 2025-08-13 DIAGNOSIS — I50.22 CHRONIC SYSTOLIC HEART FAILURE: ICD-10-CM

## 2025-08-13 DIAGNOSIS — J43.8 OTHER EMPHYSEMA: ICD-10-CM

## 2025-08-13 DIAGNOSIS — I48.21 PERMANENT ATRIAL FIBRILLATION: ICD-10-CM

## 2025-08-13 DIAGNOSIS — F10.20 UNCOMPLICATED ALCOHOL DEPENDENCE: ICD-10-CM

## 2025-08-13 DIAGNOSIS — R73.03 PREDIABETES: ICD-10-CM

## 2025-08-13 DIAGNOSIS — I70.0 AORTIC CALCIFICATION: ICD-10-CM

## 2025-08-13 DIAGNOSIS — I77.819 AORTIC ECTASIA: ICD-10-CM

## 2025-08-13 DIAGNOSIS — I27.20 PULMONARY HYPERTENSION: ICD-10-CM

## 2025-08-13 DIAGNOSIS — J44.9 STAGE 3 SEVERE COPD BY GOLD CLASSIFICATION: ICD-10-CM

## 2025-08-13 DIAGNOSIS — F17.210 CIGARETTE NICOTINE DEPENDENCE WITHOUT COMPLICATION: ICD-10-CM

## 2025-08-13 DIAGNOSIS — I73.9 PAD (PERIPHERAL ARTERY DISEASE): Primary | ICD-10-CM

## 2025-08-13 DIAGNOSIS — I10 ESSENTIAL HYPERTENSION: ICD-10-CM

## 2025-08-13 PROCEDURE — 99999 PR PBB SHADOW E&M-EST. PATIENT-LVL III: CPT | Mod: PBBFAC,,, | Performed by: INTERNAL MEDICINE

## 2025-08-13 RX ORDER — SPIRONOLACTONE 100 MG/1
100 TABLET, FILM COATED ORAL DAILY
COMMUNITY
Start: 2025-08-09

## 2025-08-25 ENCOUNTER — TELEPHONE (OUTPATIENT)
Dept: INTERNAL MEDICINE | Facility: CLINIC | Age: 75
End: 2025-08-25
Payer: MEDICARE